# Patient Record
Sex: FEMALE | Race: WHITE | Employment: FULL TIME | URBAN - METROPOLITAN AREA
[De-identification: names, ages, dates, MRNs, and addresses within clinical notes are randomized per-mention and may not be internally consistent; named-entity substitution may affect disease eponyms.]

---

## 2017-01-16 ENCOUNTER — OFFICE VISIT (OUTPATIENT)
Dept: FAMILY MEDICINE CLINIC | Age: 32
End: 2017-01-16

## 2017-01-16 VITALS
DIASTOLIC BLOOD PRESSURE: 64 MMHG | OXYGEN SATURATION: 98 % | HEIGHT: 67 IN | SYSTOLIC BLOOD PRESSURE: 116 MMHG | WEIGHT: 175.8 LBS | TEMPERATURE: 97.8 F | BODY MASS INDEX: 27.59 KG/M2 | HEART RATE: 54 BPM

## 2017-01-16 DIAGNOSIS — F41.9 ANXIETY: ICD-10-CM

## 2017-01-16 DIAGNOSIS — G43.109 MIGRAINE WITH AURA AND WITHOUT STATUS MIGRAINOSUS, NOT INTRACTABLE: Primary | ICD-10-CM

## 2017-01-16 DIAGNOSIS — F33.2 ENDOGENOUS DEPRESSION (HCC): ICD-10-CM

## 2017-01-16 PROCEDURE — 99202 OFFICE O/P NEW SF 15 MIN: CPT | Performed by: FAMILY MEDICINE

## 2017-01-16 RX ORDER — SPIRONOLACTONE 25 MG/1
25 TABLET ORAL 2 TIMES DAILY
COMMUNITY
End: 2018-01-06 | Stop reason: ALTCHOICE

## 2017-01-16 RX ORDER — VENLAFAXINE HYDROCHLORIDE 150 MG/1
150 CAPSULE, EXTENDED RELEASE ORAL DAILY
Qty: 30 CAPSULE | Refills: 5 | Status: SHIPPED | OUTPATIENT
Start: 2017-01-16 | End: 2017-05-06

## 2017-01-16 RX ORDER — NORETHINDRONE ACETATE AND ETHINYL ESTRADIOL 1.5-30(21)
KIT ORAL DAILY
COMMUNITY
Start: 2016-12-27 | End: 2017-10-05 | Stop reason: HOSPADM

## 2017-01-16 RX ORDER — VENLAFAXINE HYDROCHLORIDE 37.5 MG/1
CAPSULE, EXTENDED RELEASE ORAL
Qty: 30 CAPSULE | Refills: 0 | Status: SHIPPED | OUTPATIENT
Start: 2017-01-16 | End: 2017-05-06 | Stop reason: SDUPTHER

## 2017-01-16 RX ORDER — ELETRIPTAN HYDROBROMIDE 40 MG/1
20-40 TABLET, FILM COATED ORAL
Qty: 6 TABLET | Refills: 3 | Status: SHIPPED | OUTPATIENT
Start: 2017-01-16 | End: 2017-12-18

## 2017-02-21 ENCOUNTER — TELEPHONE (OUTPATIENT)
Dept: FAMILY MEDICINE CLINIC | Age: 32
End: 2017-02-21

## 2017-03-06 ENCOUNTER — TELEPHONE (OUTPATIENT)
Dept: FAMILY MEDICINE CLINIC | Age: 32
End: 2017-03-06

## 2017-03-06 RX ORDER — OSELTAMIVIR PHOSPHATE 75 MG/1
75 CAPSULE ORAL 2 TIMES DAILY
Qty: 10 CAPSULE | Refills: 0 | Status: SHIPPED | OUTPATIENT
Start: 2017-03-06 | End: 2017-03-11

## 2017-05-06 ENCOUNTER — OFFICE VISIT (OUTPATIENT)
Dept: FAMILY MEDICINE CLINIC | Age: 32
End: 2017-05-06

## 2017-05-06 VITALS
SYSTOLIC BLOOD PRESSURE: 110 MMHG | TEMPERATURE: 98.2 F | OXYGEN SATURATION: 97 % | HEIGHT: 67 IN | HEART RATE: 81 BPM | DIASTOLIC BLOOD PRESSURE: 62 MMHG | WEIGHT: 174.2 LBS | BODY MASS INDEX: 27.34 KG/M2

## 2017-05-06 DIAGNOSIS — F33.2 ENDOGENOUS DEPRESSION (HCC): ICD-10-CM

## 2017-05-06 DIAGNOSIS — J01.40 ACUTE NON-RECURRENT PANSINUSITIS: ICD-10-CM

## 2017-05-06 DIAGNOSIS — F41.9 ANXIETY: ICD-10-CM

## 2017-05-06 DIAGNOSIS — G43.109 MIGRAINE WITH AURA AND WITHOUT STATUS MIGRAINOSUS, NOT INTRACTABLE: Primary | ICD-10-CM

## 2017-05-06 PROCEDURE — 99214 OFFICE O/P EST MOD 30 MIN: CPT | Performed by: FAMILY MEDICINE

## 2017-05-06 RX ORDER — FLUCONAZOLE 150 MG/1
150 TABLET ORAL DAILY PRN
Qty: 1 TABLET | Refills: 0 | Status: SHIPPED | OUTPATIENT
Start: 2017-05-06 | End: 2017-05-07

## 2017-05-06 RX ORDER — AMOXICILLIN 500 MG/1
1000 CAPSULE ORAL 2 TIMES DAILY
Qty: 40 CAPSULE | Refills: 0 | Status: SHIPPED | OUTPATIENT
Start: 2017-05-06 | End: 2017-05-16

## 2017-05-06 RX ORDER — VENLAFAXINE HYDROCHLORIDE 37.5 MG/1
CAPSULE, EXTENDED RELEASE ORAL
Qty: 24 CAPSULE | Refills: 0 | Status: SHIPPED | OUTPATIENT
Start: 2017-05-06 | End: 2018-01-06 | Stop reason: ALTCHOICE

## 2017-05-06 ASSESSMENT — PATIENT HEALTH QUESTIONNAIRE - PHQ9
SUM OF ALL RESPONSES TO PHQ QUESTIONS 1-9: 0
2. FEELING DOWN, DEPRESSED OR HOPELESS: 0
SUM OF ALL RESPONSES TO PHQ9 QUESTIONS 1 & 2: 0
1. LITTLE INTEREST OR PLEASURE IN DOING THINGS: 0

## 2017-06-05 ENCOUNTER — TELEPHONE (OUTPATIENT)
Dept: FAMILY MEDICINE CLINIC | Age: 32
End: 2017-06-05

## 2017-10-05 ENCOUNTER — HOSPITAL ENCOUNTER (OUTPATIENT)
Dept: SURGERY | Age: 32
Discharge: HOME | End: 2017-10-05
Attending: OBSTETRICS & GYNECOLOGY | Admitting: OBSTETRICS & GYNECOLOGY

## 2017-10-05 VITALS
TEMPERATURE: 98.3 F | HEART RATE: 81 BPM | WEIGHT: 170 LBS | BODY MASS INDEX: 26.68 KG/M2 | HEIGHT: 67 IN | RESPIRATION RATE: 20 BRPM | DIASTOLIC BLOOD PRESSURE: 69 MMHG | SYSTOLIC BLOOD PRESSURE: 105 MMHG | OXYGEN SATURATION: 98 %

## 2017-10-05 PROBLEM — O00.90 ECTOPIC PREGNANCY: Status: ACTIVE | Noted: 2017-10-05

## 2017-10-05 LAB — GONADOTROPIN, CHORIONIC (HCG) QUANT: 627.6 MIU/ML

## 2017-10-05 RX ORDER — KETOROLAC TROMETHAMINE 30 MG/ML
30 INJECTION, SOLUTION INTRAMUSCULAR; INTRAVENOUS ONCE
Status: COMPLETED | OUTPATIENT
Start: 2017-10-05 | End: 2017-10-05

## 2017-10-05 RX ORDER — HYDROMORPHONE HCL 110MG/55ML
PATIENT CONTROLLED ANALGESIA SYRINGE INTRAVENOUS
Status: COMPLETED
Start: 2017-10-05 | End: 2017-10-05

## 2017-10-05 RX ORDER — OXYCODONE HYDROCHLORIDE AND ACETAMINOPHEN 5; 325 MG/1; MG/1
1 TABLET ORAL PRN
Status: COMPLETED | OUTPATIENT
Start: 2017-10-05 | End: 2017-10-05

## 2017-10-05 RX ORDER — OXYCODONE HYDROCHLORIDE AND ACETAMINOPHEN 5; 325 MG/1; MG/1
2 TABLET ORAL PRN
Status: COMPLETED | OUTPATIENT
Start: 2017-10-05 | End: 2017-10-05

## 2017-10-05 RX ORDER — MEPERIDINE HYDROCHLORIDE 50 MG/ML
12.5 INJECTION INTRAMUSCULAR; INTRAVENOUS; SUBCUTANEOUS EVERY 5 MIN PRN
Status: DISCONTINUED | OUTPATIENT
Start: 2017-10-05 | End: 2017-10-06 | Stop reason: HOSPADM

## 2017-10-05 RX ORDER — HYDROCODONE BITARTRATE AND ACETAMINOPHEN 5; 325 MG/1; MG/1
1 TABLET ORAL
Qty: 10 TABLET | Refills: 0 | Status: SHIPPED | OUTPATIENT
Start: 2017-10-05 | End: 2017-10-12

## 2017-10-05 RX ORDER — KETOROLAC TROMETHAMINE 30 MG/ML
INJECTION, SOLUTION INTRAMUSCULAR; INTRAVENOUS
Status: DISCONTINUED
Start: 2017-10-05 | End: 2017-10-06 | Stop reason: HOSPADM

## 2017-10-05 RX ORDER — SODIUM CHLORIDE 0.9 % (FLUSH) 0.9 %
10 SYRINGE (ML) INJECTION PRN
Status: DISCONTINUED | OUTPATIENT
Start: 2017-10-05 | End: 2017-10-06 | Stop reason: HOSPADM

## 2017-10-05 RX ORDER — HYDRALAZINE HYDROCHLORIDE 20 MG/ML
5 INJECTION INTRAMUSCULAR; INTRAVENOUS EVERY 10 MIN PRN
Status: DISCONTINUED | OUTPATIENT
Start: 2017-10-05 | End: 2017-10-06 | Stop reason: HOSPADM

## 2017-10-05 RX ORDER — SODIUM CHLORIDE, SODIUM LACTATE, POTASSIUM CHLORIDE, CALCIUM CHLORIDE 600; 310; 30; 20 MG/100ML; MG/100ML; MG/100ML; MG/100ML
INJECTION, SOLUTION INTRAVENOUS CONTINUOUS
Status: DISCONTINUED | OUTPATIENT
Start: 2017-10-05 | End: 2017-10-06 | Stop reason: HOSPADM

## 2017-10-05 RX ORDER — LABETALOL HYDROCHLORIDE 5 MG/ML
5 INJECTION, SOLUTION INTRAVENOUS EVERY 10 MIN PRN
Status: DISCONTINUED | OUTPATIENT
Start: 2017-10-05 | End: 2017-10-06 | Stop reason: HOSPADM

## 2017-10-05 RX ORDER — MIDAZOLAM HYDROCHLORIDE 1 MG/ML
2 INJECTION INTRAMUSCULAR; INTRAVENOUS ONCE
Status: DISCONTINUED | OUTPATIENT
Start: 2017-10-05 | End: 2017-10-06 | Stop reason: HOSPADM

## 2017-10-05 RX ORDER — ONDANSETRON 2 MG/ML
4 INJECTION INTRAMUSCULAR; INTRAVENOUS EVERY 10 MIN PRN
Status: DISCONTINUED | OUTPATIENT
Start: 2017-10-05 | End: 2017-10-06 | Stop reason: HOSPADM

## 2017-10-05 RX ORDER — SODIUM CHLORIDE 0.9 % (FLUSH) 0.9 %
10 SYRINGE (ML) INJECTION EVERY 12 HOURS SCHEDULED
Status: DISCONTINUED | OUTPATIENT
Start: 2017-10-05 | End: 2017-10-06 | Stop reason: HOSPADM

## 2017-10-05 RX ORDER — LIDOCAINE HYDROCHLORIDE 10 MG/ML
1 INJECTION, SOLUTION EPIDURAL; INFILTRATION; INTRACAUDAL; PERINEURAL
Status: ACTIVE | OUTPATIENT
Start: 2017-10-05 | End: 2017-10-05

## 2017-10-05 RX ADMIN — KETOROLAC TROMETHAMINE 30 MG: 30 INJECTION, SOLUTION INTRAMUSCULAR; INTRAVENOUS at 14:20

## 2017-10-05 RX ADMIN — ONDANSETRON 4 MG: 2 INJECTION INTRAMUSCULAR; INTRAVENOUS at 15:17

## 2017-10-05 RX ADMIN — OXYCODONE HYDROCHLORIDE AND ACETAMINOPHEN 2 TABLET: 5; 325 TABLET ORAL at 14:37

## 2017-10-05 RX ADMIN — Medication 0.5 MG: at 13:52

## 2017-10-05 RX ADMIN — Medication 0.5 MG: at 13:57

## 2017-10-05 RX ADMIN — Medication 2 MG: at 13:51

## 2017-10-05 RX ADMIN — Medication 0.5 MG: at 14:04

## 2017-10-05 RX ADMIN — ONDANSETRON 4 MG: 2 INJECTION INTRAMUSCULAR; INTRAVENOUS at 16:52

## 2017-10-05 RX ADMIN — Medication 0.5 MG: at 13:44

## 2017-10-05 ASSESSMENT — PAIN DESCRIPTION - DESCRIPTORS: DESCRIPTORS: CRAMPING

## 2017-10-05 ASSESSMENT — PAIN SCALES - GENERAL
PAINLEVEL_OUTOF10: 9
PAINLEVEL_OUTOF10: 5
PAINLEVEL_OUTOF10: 9
PAINLEVEL_OUTOF10: 7
PAINLEVEL_OUTOF10: 7
PAINLEVEL_OUTOF10: 8
PAINLEVEL_OUTOF10: 8
PAINLEVEL_OUTOF10: 7

## 2017-10-05 ASSESSMENT — PAIN - FUNCTIONAL ASSESSMENT: PAIN_FUNCTIONAL_ASSESSMENT: 0-10

## 2017-10-05 ASSESSMENT — PAIN DESCRIPTION - PAIN TYPE: TYPE: SURGICAL PAIN

## 2017-10-05 ASSESSMENT — PAIN DESCRIPTION - ORIENTATION: ORIENTATION: MID

## 2017-10-05 ASSESSMENT — PAIN DESCRIPTION - LOCATION: LOCATION: ABDOMEN

## 2017-10-05 NOTE — PROGRESS NOTES
Pt eating chic vera in bed. Reporting nausea at this time. Medicated per PRN order will continue to monitor.

## 2017-10-05 NOTE — PROGRESS NOTES
Discharge instructions explained to pt and pt mother. All questions answered. Copy given to pt mother with script x1.

## 2017-10-05 NOTE — BRIEF OP NOTE
Brief Postoperative Note    Heidi Jeongwilber  YOB: 1985  [id number]    Pre-operative Diagnosis: r/o ectopic pregnancy    Post-operative Diagnosis: Same, Left F. Tube ectopic gestation    Procedure: DxLS, Evacuation of ectopic pregnancy-fimbriated portion of tube    Anesthesia: General    Surgeons/Assistants: LENIN Domingo    Estimated Blood Loss: 220     Complications: None    Specimens: Was Obtained: clot/poc from sxn    Findings: 100 cc clot/old blood.  fimbrated portion of left f.tube clot/tissue    Electronically signed by Luann Pickett MD on 10/5/2017 at 1:20 PM

## 2017-10-05 NOTE — IP AVS SNAPSHOT
After Visit Summary  (Discharge Instructions)    Medication List for Home    Based on the information you provided to us as well as any changes during this visit, the following is your updated medication list.  Compare this with your prescription bottles at home. If you have any questions or concerns, contact your primary care physician's office. Daily Medication List (This medication list can be shared with any healthcare provider who is helping you manage your medications)      There are NEW medications for you. START taking them after you leave the hospital        Last Dose    Next Dose Due AM NOON PM NIGHT    HYDROcodone-acetaminophen 5-325 MG per tablet   Commonly known as:  NORCO   Take 1 tablet by mouth every 3 hours as needed for Pain . These are medications you told us you were taking at home, CONTINUE taking them after you leave the hospital        Last Dose    Next Dose Due AM NOON PM NIGHT    eletriptan 40 MG tablet   Commonly known as:  RELPAX   Take 0.5-1 tablets by mouth once as needed (migraine; can repeat in two hours) may repeat in 2 hours if necessary                                         spironolactone 25 MG tablet   Commonly known as:  ALDACTONE   Take 25 mg by mouth 2 times daily                                           ASK your doctor about these medications if you have questions        Last Dose    Next Dose Due AM NOON PM NIGHT    venlafaxine 37.5 MG extended release capsule   Commonly known as:  EFFEXOR XR   Take 3 pills a day by mouth for 4 days, then 2 pills a day by mouth for 4 days, then take 1 pill a day by mouth for 4 days, then stop.                                            These are the medications you have told us you were taking at home, STOP taking them after you leave the hospital     ibuprofen 800 MG tablet   Commonly known as:  ADVIL;MOTRIN       MICROGESTIN FE 1.5/30 1.5-30 MG-MCG tablet Generic drug:  norethindrone-ethinyl estradiol-iron            Where to Get Your Medications      You can get these medications from any pharmacy     Bring a paper prescription for each of these medications     HYDROcodone-acetaminophen 5-325 MG per tablet               Allergies as of 10/5/2017     No Known Allergies      Immunizations as of 10/5/2017     Name Date Dose VIS Date Route    MMR 5/10/2016 0.5 mL 2012 Subcutaneous      Last Vitals          Most Recent Value    Temp  97.5 °F (36.4 °C)    Pulse  87    Resp  21    BP  103/83         After Visit Summary    This summary was created for you. Thank you for entrusting your care to us. The following information includes details about your hospital/visit stay along with steps you should take to help with your recovery once you leave the hospital.  In this packet, you will find information about the topics listed below:    · Instructions about your medications including a list of your home medications  · A summary of your hospital visit  · Follow-up appointments once you have left the hospital  · Your care plan at home      You may receive a survey regarding the care you received during your stay. Your input is valuable to us. We encourage you to complete and return your survey in the envelope provided. We hope you will choose us in the future for your healthcare needs. Patient Information     Patient Name TERRY Ramos 1985      Care Provided at:     Name Address Phone       [facility] [address] [phone]            Your Visit    Here you will find information about your visit, including the reason for your visit. Please take this sheet with you when you visit your doctor or other health care provider in the future. It will help determine the best possible medical care for you at that time.   If you have any questions once you leave the hospital, please call the department phone number listed below. Why you were here     Your primary diagnosis was:  Not on File    Your diagnoses also included:  Ectopic Pregnancy      Visit Information     Date & Time Provider Department Dept. Phone    10/5/2017 Keith Sanches MD [id number] [facility] [phone]       Follow-up Appointments    Below is a list of your follow-up and future appointments. This may not be a complete list as you may have made appointments directly with providers that we are not aware of or your providers may have made some for you. Please call your providers to confirm appointments. It is important to keep your appointments. Please bring your current insurance card, photo ID, co-pay, and all medication bottles to your appointment. If self-pay, payment is expected at the time of service. Preventive Care        Date Due    Tetanus Combination Vaccine (1 - Tdap) 8/24/2002    Pap Smear 8/24/2004    Yearly Flu Vaccine (1) 9/1/2017    HIV screening is recommended for all people regardless of risk factors  aged 15-65 years at least once (lifetime) who have never been HIV tested. 5/6/2018 (Originally 8/24/1998)                 Care Plan Once You Return Home    This section includes instructions you will need to follow once you leave the hospital.  Your care team will discuss these with you, so you and those caring for you know how to best care for your health needs at home. This section may also include educational information about certain health topics that may be of help to you. Discharge Instructions            LAPAROSCOPY      Discharge instructions for day surgery patients and family    1. Since you may experience some intermittent light-headedness for the next several hours, we suggest you plan on bed rest or quiet relaxation this evening. You must  have a friend or relative stay with you tonight. 2.  Because of the sedation you have received it is recommended that you do not drive a motor vehicle, operate any kind of machinery, or sign any contractual agreement for 24 hours following  The procedure. 3.  You should not take any alcoholic beverages tonight and only take sleeping  medication that has been specifically prescribed for you by your physician. 4.  Eat light for your first meal and then gradually progress yourself back to a regular diet. 5.  If you experience pain in your surgical area take Tylenol, or the pain medication prescribed by your doctor. Some pain medications are very irritating when taken on an empty stomach, so try to take the medication wit a light meal or a glass of milk  6. If you have any fever, chills, bleeding or uncontrollable pain or any other problems, notify your surgeon. OTHER INSTRUCTIONS:    PAIN:     Tylenol or Norco q 4-6 hours PRN, call if no relief  ________________________________________________________________________  ACTIVITY:    Take it easy 1-2 days  EXERCISE:    After 1 week.  ________________________________________________________________________  SEX, DOUCHING, TAMPONS None for 1 week  SHOWER:    May shower in 24 hours. ________________________________________________________________________  Diana Bors, SWIMMING:  None for 1 week  APPOINTMENT:   Call for appointment 1 week. Needs to have bhcg drawn in office in 5 days.   ________________________________________________________________________  DRESSING:                    Discharge Instructions  Call Surgeon if you have:  · Temperature greater than 100.4  · Persistent nausea and vomiting  · Severe uncontrolled pain  · Redness, tenderness, or signs of infection (pain, swelling, redness, odor or green/yellow discharge around the site)  · Difficulty breathing, headache or visual disturbances  · Hives  · Persistent dizziness or light-headedness  · Extreme fatigue infection. Sometimes patients with SSIs also need another surgery to treat the infection. What can I do to help prevent SSIs? HAND WASHING Instructions:  Wash your hands well using soap and water and rub hands together for ten seconds. All health care workers should wash their hands prior to providing care to you, including after removing gloves. You should wash your hands  ? after using the restroom, coughing, blowing your nose or sneezing  ? before and after eating, drinking or handling food  ? after handling dirty items  ? when your hands look dirty  ? after touching or coming into contact with a person who is sick with an infection  After your surgery:   Family and friends who visit you should not touch the surgical wound or dressings.  Family and friends should clean their hands with soap and water or an alcohol-based hand rub before and after visiting you. If you do not see them clean their hands, ask them to clean their hands. What do I need to do when I go home from the hospital?   Before you go home, your doctor or nurse should explain everything you need to know about taking care of your wound. Make sure you understand how to care for your wound before you leave the hospital.   Always clean your hands before and after caring for your wound.  Before you go home, make sure you know who to contact if you have questions or problems after you get home.  If you have any symptoms of an infection, such as redness and pain at the surgery site, drainage, or fever, call your doctor immediately. Some patients are sent home from the hospital with a catheter or port in order to continue their treatment. If you go home with a catheter or port, your doctors and nurses will explain everything you need to know about taking care of your catheter.     Make sure you understand how to care for the catheter before leaving the hospital. For example, ask for instructions on showering or bathing with the catheter and how to change the catheter dressing.  Make sure you know who to contact if you have questions or problems after you get home.  Make sure you wash your hands with soap and water or an alcohol-based hand rub before handling your catheter.  Watch for the signs and symptoms of catheter-associated bloodstream infection, such as soreness or redness at the catheter site or fever, and call your healthcare provider immediately if any occur. Belongings Returned: yes      Kodi Gent   Home Medication Instructions PAL:[id number]    Printed on:10/05/17 9491   Medication Information                      eletriptan (RELPAX) 40 MG tablet  Take 0.5-1 tablets by mouth once as needed (migraine; can repeat in two hours) may repeat in 2 hours if necessary             HYDROcodone-acetaminophen (NORCO) 5-325 MG per tablet  Take 1 tablet by mouth every 3 hours as needed for Pain . spironolactone (ALDACTONE) 25 MG tablet  Take 25 mg by mouth 2 times daily             venlafaxine (EFFEXOR XR) 37.5 MG extended release capsule  Take 3 pills a day by mouth for 4 days, then 2 pills a day by mouth for 4 days, then take 1 pill a day by mouth for 4 days, then stop. I understand and acknowledge receipt of the above instructions. Patient or Guardian Signature                                                         Date/Time                                                                                                                                            Physician's or R.N.'s Signature                                                                  Date/Time    The discharge instructions have been reviewed with the patient and/or Guardian. Patient and/or Guardian signed and retained a printed copy.       [facility] Signup

## 2017-10-06 NOTE — OP NOTE
[address] [address]                               OPERATIVE REPORT    PATIENT NAME: Jaci Sethi                    :             1985  MED REC NO:   [id number]                           ROOM:  ACCOUNT NO:   [de-identified]                           ADMISSION DATE:  10/05/2017  PROVIDER:     Delila Skiff, MD    DATE OF PROCEDURE:  10/05/2017    PREOPERATIVE DIAGNOSIS:  Ectopic gestation. POSTOPERATIVE DIAGNOSIS:  Ectopic gestation. PROCEDURE:  Diagnostic laparoscopy with evacuation of probable ectopic  gestation from the left fallopian tube - fimbriated portion,  evacuation of clot and blood. SURGEON:  Delila Skiff, MD.    ANESTHESIA:  General.    ESTIMATED BLOOD LOSS:  100 mL. BLOOD TYPE:  Rh positive. SPECIMEN:  Products of conception including clot and blood. INDICATIONS AND CONSENT:  A 53-year-old  4, para 1 AB 2 with a  prior ectopic gestation presents for evaluation management. The  patient has had an abnormal progesterone and slow rising beta hCG. This morning she began having pain which was severe prompting visit in  the office. Ultrasound noted echogenicity in the left adnexal region  as well as free fluid. With her significant history, decision was  made to proceed with laparoscopy. Side effects, benefits and risks  were outlined. Consent was obtained. All questions were answered. DESCRIPTION OF PROCEDURE:  The patient was taken to the OR on  10/05/2017 . She was placed in the OR table in supine position and  general anesthetic was administered. Arms were tucked and great care  was taken to reduce any pressure points. Exam under anesthesia was  performed noting a small amount of vaginal bleeding, but with gentle  adnexal exam no gross masses. The patient was prepped and draped in  sterile fashion. Her bladder was emptied in sterile fashion.     Weighted Two incisions were closed with  four Vicryl in subcuticular fashion. Allis clamp was removed in the  cervix. The patient was taken out of dorsal lithotomy position,  awakened the OR to the recovery room in good condition. Final sponge,  needle counts were as correct. FINDINGS:  Ectopic gestation on the left fallopian tube at its  fimbriated portion. 100 mL of clot and old blood. Tissue was sent  for pathologic verification.         Willy Orr MD    D: 10/05/2017 13:30:26       T: 10/05/2017 13:44:55     [email or web address]  Job#: [id number]     Doc#: [id number]

## 2018-05-07 ENCOUNTER — OFFICE VISIT (OUTPATIENT)
Dept: FAMILY MEDICINE CLINIC | Age: 33
End: 2018-05-07

## 2018-05-07 VITALS
OXYGEN SATURATION: 98 % | WEIGHT: 196.38 LBS | HEART RATE: 106 BPM | BODY MASS INDEX: 29.86 KG/M2 | SYSTOLIC BLOOD PRESSURE: 99 MMHG | TEMPERATURE: 98.8 F | DIASTOLIC BLOOD PRESSURE: 66 MMHG

## 2018-05-07 DIAGNOSIS — J01.90 ACUTE BACTERIAL SINUSITIS: Primary | ICD-10-CM

## 2018-05-07 DIAGNOSIS — B96.89 ACUTE BACTERIAL SINUSITIS: Primary | ICD-10-CM

## 2018-05-07 PROCEDURE — 99213 OFFICE O/P EST LOW 20 MIN: CPT | Performed by: FAMILY MEDICINE

## 2018-05-07 RX ORDER — AZITHROMYCIN 250 MG/1
TABLET, FILM COATED ORAL
Qty: 1 PACKET | Refills: 0 | Status: SHIPPED | OUTPATIENT
Start: 2018-05-07 | End: 2018-05-11

## 2018-06-23 PROBLEM — O36.8190 DECREASED FETAL MOVEMENT AFFECTING PREGNANCY, ANTEPARTUM: Status: ACTIVE | Noted: 2018-06-23

## 2018-07-10 PROBLEM — Z37.9 NORMAL LABOR: Status: ACTIVE | Noted: 2018-07-10

## 2018-07-12 PROBLEM — Z34.90 TERM PREGNANCY: Status: ACTIVE | Noted: 2018-07-12

## 2018-09-13 ENCOUNTER — TELEPHONE (OUTPATIENT)
Dept: FAMILY MEDICINE CLINIC | Age: 33
End: 2018-09-13

## 2018-09-14 NOTE — TELEPHONE ENCOUNTER
To avoid any fatigue, the next thing to try would be Vit B2 daily and butterbur 75 mg bid - it's an OTC supplement with good results for migraine prevention.  Have to give it a month like any other migraine prevention med

## 2018-09-14 NOTE — TELEPHONE ENCOUNTER
Patient is aware of the preventative suggestions but is curious what you suggest for the migraines that she is currently getting. The Imitrex is not helping - she had some of this from before.  She is curious about starting the Relpax again, but is open to whatever else you may suggest.

## 2018-09-17 RX ORDER — ELETRIPTAN HYDROBROMIDE 40 MG/1
20-40 TABLET, FILM COATED ORAL
Qty: 6 TABLET | Refills: 3 | Status: SHIPPED | OUTPATIENT
Start: 2018-09-17 | End: 2019-05-27

## 2018-10-01 ENCOUNTER — TELEPHONE (OUTPATIENT)
Dept: FAMILY MEDICINE CLINIC | Age: 33
End: 2018-10-01

## 2018-10-01 RX ORDER — VENLAFAXINE HYDROCHLORIDE 150 MG/1
150 CAPSULE, EXTENDED RELEASE ORAL DAILY
Qty: 30 CAPSULE | Refills: 0 | OUTPATIENT
Start: 2018-10-01 | End: 2018-10-01

## 2018-10-01 RX ORDER — VENLAFAXINE HYDROCHLORIDE 37.5 MG/1
CAPSULE, EXTENDED RELEASE ORAL
Qty: 18 CAPSULE | Refills: 0 | OUTPATIENT
Start: 2018-10-01 | End: 2018-10-01

## 2018-10-03 PROBLEM — Z37.9 NORMAL LABOR: Status: RESOLVED | Noted: 2018-07-10 | Resolved: 2018-10-03

## 2018-10-03 PROBLEM — Z34.90 TERM PREGNANCY: Status: RESOLVED | Noted: 2018-07-12 | Resolved: 2018-10-03

## 2018-10-03 PROBLEM — O00.90 ECTOPIC PREGNANCY: Status: RESOLVED | Noted: 2017-10-05 | Resolved: 2018-10-03

## 2018-10-05 ENCOUNTER — OFFICE VISIT (OUTPATIENT)
Dept: FAMILY MEDICINE CLINIC | Age: 33
End: 2018-10-05
Payer: COMMERCIAL

## 2018-10-05 VITALS
WEIGHT: 184 LBS | SYSTOLIC BLOOD PRESSURE: 98 MMHG | BODY MASS INDEX: 27.98 KG/M2 | TEMPERATURE: 98.7 F | DIASTOLIC BLOOD PRESSURE: 56 MMHG | HEART RATE: 65 BPM | OXYGEN SATURATION: 100 %

## 2018-10-05 DIAGNOSIS — F41.9 ANXIETY: Primary | ICD-10-CM

## 2018-10-05 PROCEDURE — 99213 OFFICE O/P EST LOW 20 MIN: CPT | Performed by: FAMILY MEDICINE

## 2018-10-05 RX ORDER — BUSPIRONE HYDROCHLORIDE 5 MG/1
5-10 TABLET ORAL 3 TIMES DAILY PRN
Qty: 90 TABLET | Refills: 2 | Status: SHIPPED | OUTPATIENT
Start: 2018-10-05 | End: 2019-10-23

## 2018-10-05 RX ORDER — SERTRALINE HYDROCHLORIDE 25 MG/1
12.5-5 TABLET, FILM COATED ORAL DAILY
Qty: 60 TABLET | Refills: 5 | Status: SHIPPED | OUTPATIENT
Start: 2018-10-05 | End: 2019-05-28

## 2018-10-05 ASSESSMENT — PATIENT HEALTH QUESTIONNAIRE - PHQ9
SUM OF ALL RESPONSES TO PHQ QUESTIONS 1-9: 2
SUM OF ALL RESPONSES TO PHQ QUESTIONS 1-9: 2
SUM OF ALL RESPONSES TO PHQ9 QUESTIONS 1 & 2: 2
2. FEELING DOWN, DEPRESSED OR HOPELESS: 1
1. LITTLE INTEREST OR PLEASURE IN DOING THINGS: 1

## 2018-10-05 NOTE — PATIENT INSTRUCTIONS
Please read the healthy family handout that you were given and share it with your family. Please compare this printed medication list with your medications at home to be sure they are the same. If you have any medications that are different please contact us immediately at [phone]. Also review your allergies that we have listed, these may also include medications that you have not been able to tolerate, make sure everything listed is correct. If you have any allergies that are different please contact us immediately at [phone].

## 2018-10-05 NOTE — PROGRESS NOTES
Assessment and plan  1. Anxiety  Sertraline 12.5 mg q.d. ×5 days, then 25 mg q.d. ×5 days, then 37. 5×5 days and 50 mg a day. If she feels better before she gets to 50 she can stay at that dose. Buspirone 5-10 mg t.i.d. prn. Take the first dose in the evening to see how she does. Healthy Family prevention recommendations given. Continue all current prescription medications as listed below. RTC 2 months or sooner prn. If she is doing very well she can call [facility] cancel: Repeat return in 6 months    Subjective  Patient returns for reevaluation of her anxiety. It's got much worse this summer. She had a baby in July that was a month premature and had to be in the NICU for a month. Her boyfriend is away at work in [place]. She has a 3year-old at home. She's had some issues with her mother and stepdad and they no longer come around to help. She  reports that she has never smoked.  She has never used smokeless tobacco.  Allergies   Allergen Reactions    Topamax [Topiramate] Other (See Comments)     Kidney stones    Cymbalta [Duloxetine Hcl] Other (See Comments)     moodiness     Past history:  She reports she has recently seen her gynecologist.    Review of systems:  Constitutional:  fatigue - no                                                  abnormal weight loss - no  Cardiac:  chest pain or discomfort - no                 lightheadedness - no  Respiratory: wheezing - no                       dyspnea on exertion - no            unusual cough - no  Gastrointestinal:  frequent heartburn- no                             dysphagia - no  Urologic:  Hematuria - no                   Dysuria - no    Objective  BP (!) 98/56   Pulse 65   Temp 98.7 °F (37.1 °C) (Oral)   Wt 184 lb (83.5 kg)   SpO2 100%   BMI 27.98 kg/m²   Patient is alert, oriented, and in no acute distress  Psych:  mood and affect are unremarkable               speech and thought processes appear intact               Behavior and

## 2018-10-06 NOTE — TELEPHONE ENCOUNTER
Spoke to [facility] in [facility] and they do have the medication. Unable to leave patient message VM is full. Need to know if we can send rx here for her.

## 2018-10-08 ENCOUNTER — HOSPITAL ENCOUNTER (EMERGENCY)
Age: 33
Discharge: LEFT BEFORE DISCHARGE | End: 2018-10-08
Payer: COMMERCIAL

## 2018-10-08 VITALS
OXYGEN SATURATION: 99 % | BODY MASS INDEX: 27.28 KG/M2 | DIASTOLIC BLOOD PRESSURE: 76 MMHG | SYSTOLIC BLOOD PRESSURE: 120 MMHG | WEIGHT: 180 LBS | TEMPERATURE: 97.8 F | RESPIRATION RATE: 16 BRPM | HEART RATE: 68 BPM | HEIGHT: 68 IN

## 2018-10-08 PROCEDURE — 4500000002 HC ER NO CHARGE

## 2018-10-08 RX ORDER — SUMATRIPTAN 100 MG/1
100 TABLET, FILM COATED ORAL
COMMUNITY
End: 2019-05-27

## 2018-10-08 RX ORDER — ELETRIPTAN HYDROBROMIDE 40 MG/1
40 TABLET, FILM COATED ORAL
COMMUNITY
End: 2019-05-27

## 2018-10-08 ASSESSMENT — PAIN DESCRIPTION - LOCATION: LOCATION: HEAD

## 2018-10-08 ASSESSMENT — PAIN SCALES - GENERAL: PAINLEVEL_OUTOF10: 9

## 2018-10-08 ASSESSMENT — PAIN DESCRIPTION - ORIENTATION: ORIENTATION: LEFT

## 2018-10-09 ENCOUNTER — APPOINTMENT (OUTPATIENT)
Dept: CT IMAGING | Age: 33
End: 2018-10-09
Payer: COMMERCIAL

## 2018-10-09 ENCOUNTER — HOSPITAL ENCOUNTER (EMERGENCY)
Age: 33
Discharge: HOME | End: 2018-10-09
Attending: EMERGENCY MEDICINE
Payer: COMMERCIAL

## 2018-10-09 VITALS
TEMPERATURE: 98.6 F | BODY MASS INDEX: 27.32 KG/M2 | RESPIRATION RATE: 16 BRPM | WEIGHT: 170 LBS | SYSTOLIC BLOOD PRESSURE: 116 MMHG | HEIGHT: 66 IN | OXYGEN SATURATION: 98 % | HEART RATE: 55 BPM | DIASTOLIC BLOOD PRESSURE: 56 MMHG

## 2018-10-09 DIAGNOSIS — R51.9 LEFT-SIDED HEADACHE: Primary | ICD-10-CM

## 2018-10-09 LAB
AMORPHOUS: ABNORMAL /HPF
BACTERIA: ABNORMAL /HPF
BILIRUBIN URINE: NEGATIVE
BLOOD, URINE: ABNORMAL
CLARITY: ABNORMAL
COLOR: YELLOW
EPITHELIAL CELLS, UA: ABNORMAL /HPF
GLUCOSE URINE: NEGATIVE MG/DL
HCG(URINE) PREGNANCY TEST: NEGATIVE
KETONES, URINE: NEGATIVE MG/DL
LEUKOCYTE ESTERASE, URINE: ABNORMAL
MICROSCOPIC EXAMINATION: YES
NITRITE, URINE: NEGATIVE
PH UA: 8.5
PROTEIN UA: NEGATIVE MG/DL
RBC UA: ABNORMAL /HPF (ref 0–2)
SPECIFIC GRAVITY UA: 1.01
URINE REFLEX TO CULTURE: YES
URINE TYPE: ABNORMAL
UROBILINOGEN, URINE: 0.2 E.U./DL
WBC UA: ABNORMAL /HPF (ref 0–5)

## 2018-10-09 PROCEDURE — 96375 TX/PRO/DX INJ NEW DRUG ADDON: CPT

## 2018-10-09 PROCEDURE — 6360000002 HC RX W HCPCS: Performed by: EMERGENCY MEDICINE

## 2018-10-09 PROCEDURE — 87086 URINE CULTURE/COLONY COUNT: CPT

## 2018-10-09 PROCEDURE — 2580000003 HC RX 258: Performed by: EMERGENCY MEDICINE

## 2018-10-09 PROCEDURE — 70450 CT HEAD/BRAIN W/O DYE: CPT

## 2018-10-09 PROCEDURE — 96374 THER/PROPH/DIAG INJ IV PUSH: CPT

## 2018-10-09 PROCEDURE — 84703 CHORIONIC GONADOTROPIN ASSAY: CPT

## 2018-10-09 PROCEDURE — 99284 EMERGENCY DEPT VISIT MOD MDM: CPT

## 2018-10-09 PROCEDURE — 81001 URINALYSIS AUTO W/SCOPE: CPT

## 2018-10-09 PROCEDURE — 96361 HYDRATE IV INFUSION ADD-ON: CPT

## 2018-10-09 RX ORDER — 0.9 % SODIUM CHLORIDE 0.9 %
1000 INTRAVENOUS SOLUTION INTRAVENOUS ONCE
Status: COMPLETED | OUTPATIENT
Start: 2018-10-09 | End: 2018-10-09

## 2018-10-09 RX ORDER — METOCLOPRAMIDE HYDROCHLORIDE 5 MG/ML
10 INJECTION INTRAMUSCULAR; INTRAVENOUS ONCE
Status: COMPLETED | OUTPATIENT
Start: 2018-10-09 | End: 2018-10-09

## 2018-10-09 RX ORDER — DIPHENHYDRAMINE HYDROCHLORIDE 50 MG/ML
25 INJECTION INTRAMUSCULAR; INTRAVENOUS ONCE
Status: COMPLETED | OUTPATIENT
Start: 2018-10-09 | End: 2018-10-09

## 2018-10-09 RX ADMIN — Medication 25 MG: at 01:36

## 2018-10-09 RX ADMIN — SODIUM CHLORIDE 1000 ML: 9 INJECTION, SOLUTION INTRAVENOUS at 01:36

## 2018-10-09 RX ADMIN — METOCLOPRAMIDE HYDROCHLORIDE 10 MG: 5 INJECTION INTRAMUSCULAR; INTRAVENOUS at 01:36

## 2018-10-09 ASSESSMENT — ENCOUNTER SYMPTOMS
FACIAL SWELLING: 0
VOICE CHANGE: 0
SHORTNESS OF BREATH: 0
STRIDOR: 0
TROUBLE SWALLOWING: 0
PHOTOPHOBIA: 1
COLOR CHANGE: 0
WHEEZING: 0
ABDOMINAL PAIN: 0
VOMITING: 0
NAUSEA: 0

## 2018-10-09 NOTE — ED NOTES
--Patient provided with discharge instructions. --Instructions and follow-up appointments reviewed with patient/family. No further questions or needs at this time. --Vital signs and patient stable upon discharge. --Patient ambulatory to [facility].         Arlen Lin RN  10/09/18 [id number]
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
## 2018-10-10 ENCOUNTER — TELEPHONE (OUTPATIENT)
Dept: FAMILY MEDICINE CLINIC | Age: 33
End: 2018-10-10

## 2018-10-10 LAB — URINE CULTURE, ROUTINE: NORMAL

## 2018-10-10 RX ORDER — INDOMETHACIN 50 MG/1
50 CAPSULE ORAL 3 TIMES DAILY
Qty: 9 CAPSULE | Refills: 0 | Status: SHIPPED | OUTPATIENT
Start: 2018-10-10 | End: 2019-03-07

## 2019-02-19 ENCOUNTER — TELEPHONE (OUTPATIENT)
Dept: FAMILY MEDICINE CLINIC | Age: 34
End: 2019-02-19

## 2019-03-17 ENCOUNTER — ANESTHESIA EVENT (OUTPATIENT)
Dept: OPERATING ROOM | Age: 34
End: 2019-03-17
Payer: COMMERCIAL

## 2019-03-17 ASSESSMENT — LIFESTYLE VARIABLES: SMOKING_STATUS: 0

## 2019-03-18 ENCOUNTER — HOSPITAL ENCOUNTER (OUTPATIENT)
Age: 34
Setting detail: OUTPATIENT SURGERY
Discharge: HOME | End: 2019-03-18
Attending: OBSTETRICS & GYNECOLOGY | Admitting: OBSTETRICS & GYNECOLOGY
Payer: COMMERCIAL

## 2019-03-18 ENCOUNTER — ANESTHESIA (OUTPATIENT)
Dept: OPERATING ROOM | Age: 34
End: 2019-03-18
Payer: COMMERCIAL

## 2019-03-18 VITALS — SYSTOLIC BLOOD PRESSURE: 100 MMHG | OXYGEN SATURATION: 100 % | TEMPERATURE: 96.6 F | DIASTOLIC BLOOD PRESSURE: 58 MMHG

## 2019-03-18 VITALS
HEIGHT: 68 IN | RESPIRATION RATE: 14 BRPM | OXYGEN SATURATION: 96 % | BODY MASS INDEX: 27.74 KG/M2 | SYSTOLIC BLOOD PRESSURE: 109 MMHG | DIASTOLIC BLOOD PRESSURE: 57 MMHG | TEMPERATURE: 97 F | HEART RATE: 43 BPM | WEIGHT: 183 LBS

## 2019-03-18 DIAGNOSIS — N93.8 DYSFUNCTIONAL UTERINE BLEEDING: ICD-10-CM

## 2019-03-18 DIAGNOSIS — D25.9 UTERINE LEIOMYOMA, UNSPECIFIED LOCATION: ICD-10-CM

## 2019-03-18 PROBLEM — N93.9 ABNORMAL UTERINE BLEEDING: Status: ACTIVE | Noted: 2019-03-18

## 2019-03-18 LAB
ABO/RH: NORMAL
ANTIBODY SCREEN: NORMAL
PREGNANCY, URINE: NEGATIVE

## 2019-03-18 PROCEDURE — 6370000000 HC RX 637 (ALT 250 FOR IP): Performed by: ANESTHESIOLOGY

## 2019-03-18 PROCEDURE — 3700000001 HC ADD 15 MINUTES (ANESTHESIA): Performed by: OBSTETRICS & GYNECOLOGY

## 2019-03-18 PROCEDURE — 84703 CHORIONIC GONADOTROPIN ASSAY: CPT

## 2019-03-18 PROCEDURE — 6360000002 HC RX W HCPCS: Performed by: ANESTHESIOLOGY

## 2019-03-18 PROCEDURE — 86850 RBC ANTIBODY SCREEN: CPT

## 2019-03-18 PROCEDURE — 7100000010 HC PHASE II RECOVERY - FIRST 15 MIN: Performed by: OBSTETRICS & GYNECOLOGY

## 2019-03-18 PROCEDURE — 3600000009 HC SURGERY ROBOT BASE: Performed by: OBSTETRICS & GYNECOLOGY

## 2019-03-18 PROCEDURE — 2500000003 HC RX 250 WO HCPCS: Performed by: OBSTETRICS & GYNECOLOGY

## 2019-03-18 PROCEDURE — 7100000001 HC PACU RECOVERY - ADDTL 15 MIN: Performed by: OBSTETRICS & GYNECOLOGY

## 2019-03-18 PROCEDURE — 6360000002 HC RX W HCPCS: Performed by: OBSTETRICS & GYNECOLOGY

## 2019-03-18 PROCEDURE — 2580000003 HC RX 258: Performed by: OBSTETRICS & GYNECOLOGY

## 2019-03-18 PROCEDURE — S2900 ROBOTIC SURGICAL SYSTEM: HCPCS | Performed by: OBSTETRICS & GYNECOLOGY

## 2019-03-18 PROCEDURE — 7100000011 HC PHASE II RECOVERY - ADDTL 15 MIN: Performed by: OBSTETRICS & GYNECOLOGY

## 2019-03-18 PROCEDURE — 2709999900 HC NON-CHARGEABLE SUPPLY: Performed by: OBSTETRICS & GYNECOLOGY

## 2019-03-18 PROCEDURE — 88307 TISSUE EXAM BY PATHOLOGIST: CPT

## 2019-03-18 PROCEDURE — 3700000000 HC ANESTHESIA ATTENDED CARE: Performed by: OBSTETRICS & GYNECOLOGY

## 2019-03-18 PROCEDURE — 2500000003 HC RX 250 WO HCPCS: Performed by: NURSE ANESTHETIST, CERTIFIED REGISTERED

## 2019-03-18 PROCEDURE — 6360000002 HC RX W HCPCS: Performed by: NURSE ANESTHETIST, CERTIFIED REGISTERED

## 2019-03-18 PROCEDURE — 86901 BLOOD TYPING SEROLOGIC RH(D): CPT

## 2019-03-18 PROCEDURE — 7100000000 HC PACU RECOVERY - FIRST 15 MIN: Performed by: OBSTETRICS & GYNECOLOGY

## 2019-03-18 PROCEDURE — 3600000019 HC SURGERY ROBOT ADDTL 15MIN: Performed by: OBSTETRICS & GYNECOLOGY

## 2019-03-18 PROCEDURE — 86900 BLOOD TYPING SEROLOGIC ABO: CPT

## 2019-03-18 RX ORDER — OXYCODONE HYDROCHLORIDE AND ACETAMINOPHEN 5; 325 MG/1; MG/1
1 TABLET ORAL PRN
Status: COMPLETED | OUTPATIENT
Start: 2019-03-18 | End: 2019-03-18

## 2019-03-18 RX ORDER — ACETAMINOPHEN 500 MG
1500 TABLET ORAL ONCE
Status: COMPLETED | OUTPATIENT
Start: 2019-03-18 | End: 2019-03-18

## 2019-03-18 RX ORDER — BUPIVACAINE HYDROCHLORIDE AND EPINEPHRINE 5; 5 MG/ML; UG/ML
INJECTION, SOLUTION PERINEURAL PRN
Status: DISCONTINUED | OUTPATIENT
Start: 2019-03-18 | End: 2019-03-18 | Stop reason: ALTCHOICE

## 2019-03-18 RX ORDER — HYDRALAZINE HYDROCHLORIDE 20 MG/ML
5 INJECTION INTRAMUSCULAR; INTRAVENOUS EVERY 10 MIN PRN
Status: DISCONTINUED | OUTPATIENT
Start: 2019-03-18 | End: 2019-03-18 | Stop reason: HOSPADM

## 2019-03-18 RX ORDER — SODIUM CHLORIDE, SODIUM LACTATE, POTASSIUM CHLORIDE, CALCIUM CHLORIDE 600; 310; 30; 20 MG/100ML; MG/100ML; MG/100ML; MG/100ML
INJECTION, SOLUTION INTRAVENOUS CONTINUOUS
Status: DISCONTINUED | OUTPATIENT
Start: 2019-03-18 | End: 2019-03-18 | Stop reason: HOSPADM

## 2019-03-18 RX ORDER — ONDANSETRON 2 MG/ML
INJECTION INTRAMUSCULAR; INTRAVENOUS PRN
Status: DISCONTINUED | OUTPATIENT
Start: 2019-03-18 | End: 2019-03-18 | Stop reason: SDUPTHER

## 2019-03-18 RX ORDER — PROPOFOL 10 MG/ML
INJECTION, EMULSION INTRAVENOUS PRN
Status: DISCONTINUED | OUTPATIENT
Start: 2019-03-18 | End: 2019-03-18 | Stop reason: SDUPTHER

## 2019-03-18 RX ORDER — MEPERIDINE HYDROCHLORIDE 50 MG/ML
12.5 INJECTION INTRAMUSCULAR; INTRAVENOUS; SUBCUTANEOUS EVERY 5 MIN PRN
Status: DISCONTINUED | OUTPATIENT
Start: 2019-03-18 | End: 2019-03-18 | Stop reason: HOSPADM

## 2019-03-18 RX ORDER — SCOLOPAMINE TRANSDERMAL SYSTEM 1 MG/1
1 PATCH, EXTENDED RELEASE TRANSDERMAL
Status: DISCONTINUED | OUTPATIENT
Start: 2019-03-18 | End: 2019-03-18 | Stop reason: HOSPADM

## 2019-03-18 RX ORDER — ONDANSETRON 2 MG/ML
4 INJECTION INTRAMUSCULAR; INTRAVENOUS
Status: COMPLETED | OUTPATIENT
Start: 2019-03-18 | End: 2019-03-18

## 2019-03-18 RX ORDER — HYDROMORPHONE HCL 110MG/55ML
PATIENT CONTROLLED ANALGESIA SYRINGE INTRAVENOUS PRN
Status: DISCONTINUED | OUTPATIENT
Start: 2019-03-18 | End: 2019-03-18 | Stop reason: SDUPTHER

## 2019-03-18 RX ORDER — LIDOCAINE HYDROCHLORIDE 20 MG/ML
INJECTION, SOLUTION EPIDURAL; INFILTRATION; INTRACAUDAL; PERINEURAL PRN
Status: DISCONTINUED | OUTPATIENT
Start: 2019-03-18 | End: 2019-03-18 | Stop reason: SDUPTHER

## 2019-03-18 RX ORDER — OXYCODONE HYDROCHLORIDE AND ACETAMINOPHEN 5; 325 MG/1; MG/1
1 TABLET ORAL
Status: DISCONTINUED | OUTPATIENT
Start: 2019-03-18 | End: 2019-03-18 | Stop reason: HOSPADM

## 2019-03-18 RX ORDER — APREPITANT 40 MG/1
40 CAPSULE ORAL ONCE
Status: COMPLETED | OUTPATIENT
Start: 2019-03-18 | End: 2019-03-18

## 2019-03-18 RX ORDER — ROCURONIUM BROMIDE 10 MG/ML
INJECTION, SOLUTION INTRAVENOUS PRN
Status: DISCONTINUED | OUTPATIENT
Start: 2019-03-18 | End: 2019-03-18 | Stop reason: SDUPTHER

## 2019-03-18 RX ORDER — KETOROLAC TROMETHAMINE 30 MG/ML
INJECTION, SOLUTION INTRAMUSCULAR; INTRAVENOUS PRN
Status: DISCONTINUED | OUTPATIENT
Start: 2019-03-18 | End: 2019-03-18 | Stop reason: SDUPTHER

## 2019-03-18 RX ORDER — IBUPROFEN 200 MG
600 TABLET ORAL EVERY 6 HOURS PRN
Qty: 25 TABLET | Refills: 1 | Status: SHIPPED | OUTPATIENT
Start: 2019-03-18 | End: 2019-05-27

## 2019-03-18 RX ORDER — OXYCODONE HYDROCHLORIDE AND ACETAMINOPHEN 5; 325 MG/1; MG/1
2 TABLET ORAL PRN
Status: COMPLETED | OUTPATIENT
Start: 2019-03-18 | End: 2019-03-18

## 2019-03-18 RX ORDER — SODIUM CHLORIDE, SODIUM LACTATE, POTASSIUM CHLORIDE, AND CALCIUM CHLORIDE .6; .31; .03; .02 G/100ML; G/100ML; G/100ML; G/100ML
IRRIGANT IRRIGATION PRN
Status: DISCONTINUED | OUTPATIENT
Start: 2019-03-18 | End: 2019-03-18 | Stop reason: ALTCHOICE

## 2019-03-18 RX ORDER — FENTANYL CITRATE 50 UG/ML
25 INJECTION, SOLUTION INTRAMUSCULAR; INTRAVENOUS EVERY 5 MIN PRN
Status: DISCONTINUED | OUTPATIENT
Start: 2019-03-18 | End: 2019-03-18 | Stop reason: HOSPADM

## 2019-03-18 RX ORDER — OXYCODONE HYDROCHLORIDE AND ACETAMINOPHEN 5; 325 MG/1; MG/1
1 TABLET ORAL EVERY 4 HOURS PRN
Qty: 12 TABLET | Refills: 0 | Status: SHIPPED | OUTPATIENT
Start: 2019-03-18 | End: 2019-03-21

## 2019-03-18 RX ORDER — DEXAMETHASONE SODIUM PHOSPHATE 4 MG/ML
INJECTION, SOLUTION INTRA-ARTICULAR; INTRALESIONAL; INTRAMUSCULAR; INTRAVENOUS; SOFT TISSUE PRN
Status: DISCONTINUED | OUTPATIENT
Start: 2019-03-18 | End: 2019-03-18 | Stop reason: SDUPTHER

## 2019-03-18 RX ADMIN — LIDOCAINE HYDROCHLORIDE 70 MG: 20 INJECTION, SOLUTION EPIDURAL; INFILTRATION; INTRACAUDAL; PERINEURAL at 07:45

## 2019-03-18 RX ADMIN — HYDROMORPHONE HYDROCHLORIDE 0.5 MG: 1 INJECTION, SOLUTION INTRAMUSCULAR; INTRAVENOUS; SUBCUTANEOUS at 11:14

## 2019-03-18 RX ADMIN — ROCURONIUM BROMIDE 50 MG: 10 SOLUTION INTRAVENOUS at 07:45

## 2019-03-18 RX ADMIN — ROCURONIUM BROMIDE 15 MG: 10 SOLUTION INTRAVENOUS at 08:45

## 2019-03-18 RX ADMIN — HYDROMORPHONE HYDROCHLORIDE 0.25 MG: 1 INJECTION, SOLUTION INTRAMUSCULAR; INTRAVENOUS; SUBCUTANEOUS at 11:29

## 2019-03-18 RX ADMIN — SODIUM CHLORIDE, POTASSIUM CHLORIDE, SODIUM LACTATE AND CALCIUM CHLORIDE: 600; 310; 30; 20 INJECTION, SOLUTION INTRAVENOUS at 10:42

## 2019-03-18 RX ADMIN — ROCURONIUM BROMIDE 10 MG: 10 SOLUTION INTRAVENOUS at 09:33

## 2019-03-18 RX ADMIN — PROPOFOL 200 MG: 10 INJECTION, EMULSION INTRAVENOUS at 07:45

## 2019-03-18 RX ADMIN — HYDROMORPHONE HYDROCHLORIDE 0.6 MG: 2 INJECTION INTRAMUSCULAR; INTRAVENOUS; SUBCUTANEOUS at 09:52

## 2019-03-18 RX ADMIN — HYDROMORPHONE HYDROCHLORIDE 0.6 MG: 2 INJECTION INTRAMUSCULAR; INTRAVENOUS; SUBCUTANEOUS at 09:45

## 2019-03-18 RX ADMIN — APREPITANT 40 MG: 40 CAPSULE ORAL at 07:18

## 2019-03-18 RX ADMIN — SODIUM CHLORIDE, POTASSIUM CHLORIDE, SODIUM LACTATE AND CALCIUM CHLORIDE: 600; 310; 30; 20 INJECTION, SOLUTION INTRAVENOUS at 07:17

## 2019-03-18 RX ADMIN — Medication 2 G: at 07:40

## 2019-03-18 RX ADMIN — HYDROMORPHONE HYDROCHLORIDE 0.4 MG: 2 INJECTION INTRAMUSCULAR; INTRAVENOUS; SUBCUTANEOUS at 10:00

## 2019-03-18 RX ADMIN — HYDROMORPHONE HYDROCHLORIDE 0.4 MG: 2 INJECTION INTRAMUSCULAR; INTRAVENOUS; SUBCUTANEOUS at 07:45

## 2019-03-18 RX ADMIN — SODIUM CHLORIDE, POTASSIUM CHLORIDE, SODIUM LACTATE AND CALCIUM CHLORIDE: 600; 310; 30; 20 INJECTION, SOLUTION INTRAVENOUS at 08:54

## 2019-03-18 RX ADMIN — OXYCODONE AND ACETAMINOPHEN 1 TABLET: 5; 325 TABLET ORAL at 12:20

## 2019-03-18 RX ADMIN — ONDANSETRON 4 MG: 2 INJECTION INTRAMUSCULAR; INTRAVENOUS at 13:05

## 2019-03-18 RX ADMIN — ONDANSETRON 4 MG: 2 INJECTION INTRAMUSCULAR; INTRAVENOUS at 09:45

## 2019-03-18 RX ADMIN — DEXAMETHASONE SODIUM PHOSPHATE 8 MG: 4 INJECTION, SOLUTION INTRAMUSCULAR; INTRAVENOUS at 09:45

## 2019-03-18 RX ADMIN — ACETAMINOPHEN 1500 MG: 500 TABLET ORAL at 07:19

## 2019-03-18 RX ADMIN — SUGAMMADEX 150 MG: 100 INJECTION, SOLUTION INTRAVENOUS at 09:49

## 2019-03-18 RX ADMIN — KETOROLAC TROMETHAMINE 30 MG: 30 INJECTION, SOLUTION INTRAMUSCULAR; INTRAVENOUS at 09:45

## 2019-03-18 ASSESSMENT — PULMONARY FUNCTION TESTS
PIF_VALUE: 17
PIF_VALUE: 28
PIF_VALUE: 27
PIF_VALUE: 16
PIF_VALUE: 29
PIF_VALUE: 28
PIF_VALUE: 10
PIF_VALUE: 28
PIF_VALUE: 1
PIF_VALUE: 0
PIF_VALUE: 18
PIF_VALUE: 27
PIF_VALUE: 29
PIF_VALUE: 29
PIF_VALUE: 30
PIF_VALUE: 19
PIF_VALUE: 28
PIF_VALUE: 18
PIF_VALUE: 28
PIF_VALUE: 18
PIF_VALUE: 29
PIF_VALUE: 28
PIF_VALUE: 3
PIF_VALUE: 18
PIF_VALUE: 2
PIF_VALUE: 28
PIF_VALUE: 3
PIF_VALUE: 0
PIF_VALUE: 4
PIF_VALUE: 29
PIF_VALUE: 0
PIF_VALUE: 30
PIF_VALUE: 27
PIF_VALUE: 28
PIF_VALUE: 18
PIF_VALUE: 27
PIF_VALUE: 18
PIF_VALUE: 29
PIF_VALUE: 17
PIF_VALUE: 3
PIF_VALUE: 17
PIF_VALUE: 6
PIF_VALUE: 15
PIF_VALUE: 1
PIF_VALUE: 3
PIF_VALUE: 2
PIF_VALUE: 19
PIF_VALUE: 29
PIF_VALUE: 3
PIF_VALUE: 18
PIF_VALUE: 0
PIF_VALUE: 18
PIF_VALUE: 23
PIF_VALUE: 0
PIF_VALUE: 29
PIF_VALUE: 17
PIF_VALUE: 18
PIF_VALUE: 27
PIF_VALUE: 20
PIF_VALUE: 18
PIF_VALUE: 25
PIF_VALUE: 17
PIF_VALUE: 24
PIF_VALUE: 27
PIF_VALUE: 17
PIF_VALUE: 25
PIF_VALUE: 29
PIF_VALUE: 3
PIF_VALUE: 18
PIF_VALUE: 28
PIF_VALUE: 19
PIF_VALUE: 0
PIF_VALUE: 28
PIF_VALUE: 16
PIF_VALUE: 29
PIF_VALUE: 27
PIF_VALUE: 29
PIF_VALUE: 26
PIF_VALUE: 18
PIF_VALUE: 28
PIF_VALUE: 3
PIF_VALUE: 3
PIF_VALUE: 28
PIF_VALUE: 29
PIF_VALUE: 28
PIF_VALUE: 26
PIF_VALUE: 3
PIF_VALUE: 29
PIF_VALUE: 25
PIF_VALUE: 28
PIF_VALUE: 29
PIF_VALUE: 29
PIF_VALUE: 18
PIF_VALUE: 18
PIF_VALUE: 17
PIF_VALUE: 23
PIF_VALUE: 29
PIF_VALUE: 26
PIF_VALUE: 27
PIF_VALUE: 3
PIF_VALUE: 24
PIF_VALUE: 18
PIF_VALUE: 19
PIF_VALUE: 27
PIF_VALUE: 11
PIF_VALUE: 26
PIF_VALUE: 30
PIF_VALUE: 19
PIF_VALUE: 24
PIF_VALUE: 18
PIF_VALUE: 25
PIF_VALUE: 0
PIF_VALUE: 23
PIF_VALUE: 29
PIF_VALUE: 29
PIF_VALUE: 16
PIF_VALUE: 18
PIF_VALUE: 28
PIF_VALUE: 3
PIF_VALUE: 28
PIF_VALUE: 27
PIF_VALUE: 4
PIF_VALUE: 17
PIF_VALUE: 27
PIF_VALUE: 29
PIF_VALUE: 17
PIF_VALUE: 1
PIF_VALUE: 0
PIF_VALUE: 29
PIF_VALUE: 18
PIF_VALUE: 29
PIF_VALUE: 29
PIF_VALUE: 22
PIF_VALUE: 23
PIF_VALUE: 16
PIF_VALUE: 30
PIF_VALUE: 17
PIF_VALUE: 2
PIF_VALUE: 0
PIF_VALUE: 18
PIF_VALUE: 26
PIF_VALUE: 19
PIF_VALUE: 29
PIF_VALUE: 28
PIF_VALUE: 19
PIF_VALUE: 3
PIF_VALUE: 3
PIF_VALUE: 28

## 2019-03-18 ASSESSMENT — PAIN SCALES - GENERAL
PAINLEVEL_OUTOF10: 5
PAINLEVEL_OUTOF10: 7
PAINLEVEL_OUTOF10: 7
PAINLEVEL_OUTOF10: 2
PAINLEVEL_OUTOF10: 0

## 2019-03-18 ASSESSMENT — PAIN - FUNCTIONAL ASSESSMENT: PAIN_FUNCTIONAL_ASSESSMENT: 0-10

## 2019-04-16 RX ORDER — ELETRIPTAN HYDROBROMIDE 40 MG/1
TABLET, FILM COATED ORAL
Qty: 6 TABLET | Refills: 2 | Status: SHIPPED | OUTPATIENT
Start: 2019-04-16 | End: 2019-05-28 | Stop reason: SDUPTHER

## 2019-04-26 ENCOUNTER — OFFICE VISIT (OUTPATIENT)
Dept: FAMILY MEDICINE CLINIC | Age: 34
End: 2019-04-26
Payer: COMMERCIAL

## 2019-04-26 VITALS
SYSTOLIC BLOOD PRESSURE: 130 MMHG | DIASTOLIC BLOOD PRESSURE: 70 MMHG | OXYGEN SATURATION: 98 % | WEIGHT: 192.4 LBS | BODY MASS INDEX: 29.25 KG/M2 | HEART RATE: 70 BPM | TEMPERATURE: 98.1 F

## 2019-04-26 DIAGNOSIS — B97.89 SORE THROAT (VIRAL): Primary | ICD-10-CM

## 2019-04-26 DIAGNOSIS — J02.8 SORE THROAT (VIRAL): Primary | ICD-10-CM

## 2019-04-26 LAB — S PYO AG THROAT QL: NORMAL

## 2019-04-26 PROCEDURE — 87880 STREP A ASSAY W/OPTIC: CPT | Performed by: NURSE PRACTITIONER

## 2019-04-26 PROCEDURE — 99213 OFFICE O/P EST LOW 20 MIN: CPT | Performed by: NURSE PRACTITIONER

## 2019-04-26 RX ORDER — SPIRONOLACTONE 25 MG/1
25 TABLET ORAL DAILY
Status: ON HOLD | COMMUNITY
End: 2022-07-27

## 2019-04-26 ASSESSMENT — ENCOUNTER SYMPTOMS
EYES NEGATIVE: 1
SORE THROAT: 1
RESPIRATORY NEGATIVE: 1
GASTROINTESTINAL NEGATIVE: 1

## 2019-04-26 NOTE — PROGRESS NOTES
Subjective:      Patient ID: Jodell Lesches is a 26 y.o. female. HPI    Chief Complaint   Patient presents with    Pharyngitis     Sore Throat  Patient complains of sore throat. Associated symptoms include nasal blockage, post nasal drip and sore throat. Onset of symptoms was 1 day ago, gradually worsening since that time. She is drinking plenty of fluids. She has not had recent close exposure to someone with proven streptococcal pharyngitis. Patient also c/o left ear pain. Patient denies fevers or chills. Review of Systems   Constitutional: Negative for appetite change, chills and fever. HENT: Positive for postnasal drip and sore throat. Eyes: Negative. Respiratory: Negative. Cardiovascular: Negative. Gastrointestinal: Negative. Endocrine: Negative. Genitourinary: Negative. Neurological: Negative. Hematological: Negative. Psychiatric/Behavioral: Negative.         Patient Active Problem List   Diagnosis    Migraine with aura and without status migrainosus, not intractable    Interstitial cystitis    Allergic sinusitis    Kidney stones    PCOS (polycystic ovarian syndrome)    Endogenous depression (HCC)    Anxiety    Decreased fetal movement affecting pregnancy, antepartum    Abnormal uterine bleeding       Outpatient Medications Marked as Taking for the 4/26/19 encounter (Office Visit) with PERLA Smith CNP   Medication Sig Dispense Refill    spironolactone (ALDACTONE) 25 MG tablet Take 25 mg by mouth daily         Allergies   Allergen Reactions    Topamax [Topiramate] Other (See Comments)     Kidney stones    Cymbalta [Duloxetine Hcl] Other (See Comments)     moodiness       Social History     Tobacco Use    Smoking status: Never Smoker    Smokeless tobacco: Never Used   Substance Use Topics    Alcohol use: No       Objective:   /70   Pulse 70   Temp 98.1 °F (36.7 °C) (Oral)   Wt 192 lb 6.4 oz (87.3 kg)   LMP 03/13/2019   SpO2 98%   BMI 29.25 kg/m²     Physical Exam   Constitutional: She is oriented to person, place, and time. She appears well-developed and well-nourished. She is cooperative. She does not have a sickly appearance. No distress. HENT:   Head: Normocephalic and atraumatic. Mouth/Throat: Uvula is midline and mucous membranes are normal. Posterior oropharyngeal erythema present. No oropharyngeal exudate or posterior oropharyngeal edema. Eyes: Conjunctivae and EOM are normal.   Neck: Normal range of motion. Neck supple. Cardiovascular: Normal rate, regular rhythm, S1 normal, S2 normal and normal heart sounds. Pulmonary/Chest: Effort normal and breath sounds normal. No accessory muscle usage. No respiratory distress. Abdominal: Soft. Bowel sounds are normal.   Musculoskeletal: Normal range of motion. Lymphadenopathy:     She has no cervical adenopathy. Neurological: She is alert and oriented to person, place, and time. Skin: Skin is warm and dry. Psychiatric: She has a normal mood and affect. Her speech is normal and behavior is normal.       Assessment/Plan:   1. Sore throat (viral)  Patient presents today with complaints of sore throat for less than 1 day. Patient reports blisters and throat however denies feeling ill, fever, chills, nasal congestion, sinus pressure or cough. On exam noted mild oropharyngeal erythema, primarily on the left side otherwise exam is benign. Rapid strep is negative. I suspect symptoms are viral in nature and advised patient to do warm salt water gargles 2-3 times a day, drink plenty of fluids and patient to follow-up if no better or worsening of symptoms. Patient verbalized understanding and agreeable to plan.   - POCT rapid strep A

## 2019-04-26 NOTE — PATIENT INSTRUCTIONS
Please read the healthy family handout that you were given and share it with your family. Please compare this printed medication list with your medications at home to be sure they are the same. If you have any medications that are different please contact us immediately at [phone]. Also review your allergies that we have listed, these may also include medications that you have not been able to tolerate, make sure everything listed is correct. If you have any allergies that are different please contact us immediately at [phone]. Patient Education     Drink plenty of fluids, do warm salt water gargles and Patient to f/u if no better or worsening of symptoms. Sore Throat: Care Instructions  Your Care Instructions    Infection by bacteria or a virus causes most sore throats. Cigarette smoke, dry air, air pollution, allergies, and yelling can also cause a sore throat. Sore throats can be painful and annoying. Fortunately, most sore throats go away on their own. If you have a bacterial infection, your doctor may prescribe antibiotics. Follow-up care is a key part of your treatment and safety. Be sure to make and go to all appointments, and call your doctor if you are having problems. It's also a good idea to know your test results and keep a list of the medicines you take. How can you care for yourself at home? · If your doctor prescribed antibiotics, take them as directed. Do not stop taking them just because you feel better. You need to take the full course of antibiotics. · Gargle with warm salt water once an hour to help reduce swelling and relieve discomfort. Use 1 teaspoon of salt mixed in 1 cup of warm water. · Take an over-the-counter pain medicine, such as acetaminophen (Tylenol), ibuprofen (Advil, Motrin), or naproxen (Aleve). Read and follow all instructions on the label. · Be careful when taking over-the-counter cold or flu medicines and Tylenol at the same time.  Many of these

## 2019-05-28 ENCOUNTER — OFFICE VISIT (OUTPATIENT)
Dept: FAMILY MEDICINE CLINIC | Age: 34
End: 2019-05-28
Payer: COMMERCIAL

## 2019-05-28 VITALS
TEMPERATURE: 98.8 F | HEART RATE: 95 BPM | BODY MASS INDEX: 28.86 KG/M2 | DIASTOLIC BLOOD PRESSURE: 64 MMHG | SYSTOLIC BLOOD PRESSURE: 98 MMHG | WEIGHT: 189.8 LBS | OXYGEN SATURATION: 98 %

## 2019-05-28 DIAGNOSIS — J01.90 ACUTE BACTERIAL SINUSITIS: ICD-10-CM

## 2019-05-28 DIAGNOSIS — F41.9 ANXIETY: ICD-10-CM

## 2019-05-28 DIAGNOSIS — G43.109 MIGRAINE WITH AURA AND WITHOUT STATUS MIGRAINOSUS, NOT INTRACTABLE: Primary | ICD-10-CM

## 2019-05-28 DIAGNOSIS — R41.3 MEMORY LOSS: ICD-10-CM

## 2019-05-28 DIAGNOSIS — B96.89 ACUTE BACTERIAL SINUSITIS: ICD-10-CM

## 2019-05-28 PROBLEM — N93.9 ABNORMAL UTERINE BLEEDING: Status: RESOLVED | Noted: 2019-03-18 | Resolved: 2019-05-28

## 2019-05-28 PROBLEM — O36.8190 DECREASED FETAL MOVEMENT AFFECTING PREGNANCY, ANTEPARTUM: Status: RESOLVED | Noted: 2018-06-23 | Resolved: 2019-05-28

## 2019-05-28 LAB
BASOPHILS ABSOLUTE: 0 K/UL (ref 0–0.2)
BASOPHILS RELATIVE PERCENT: 0.2 %
EOSINOPHILS ABSOLUTE: 0.4 K/UL (ref 0–0.6)
EOSINOPHILS RELATIVE PERCENT: 3.8 %
HCT VFR BLD CALC: 40.6 % (ref 36–48)
HEMOGLOBIN: 13.5 G/DL (ref 12–16)
LYMPHOCYTES ABSOLUTE: 1.6 K/UL (ref 1–5.1)
LYMPHOCYTES RELATIVE PERCENT: 15.2 %
MCH RBC QN AUTO: 28.6 PG (ref 26–34)
MCHC RBC AUTO-ENTMCNC: 33.3 G/DL (ref 31–36)
MCV RBC AUTO: 86 FL (ref 80–100)
MONOCYTES ABSOLUTE: 1.1 K/UL (ref 0–1.3)
MONOCYTES RELATIVE PERCENT: 10.4 %
NEUTROPHILS ABSOLUTE: 7.2 K/UL (ref 1.7–7.7)
NEUTROPHILS RELATIVE PERCENT: 70.4 %
PDW BLD-RTO: 13 % (ref 12.4–15.4)
PLATELET # BLD: 287 K/UL (ref 135–450)
PMV BLD AUTO: 7.9 FL (ref 5–10.5)
RBC # BLD: 4.72 M/UL (ref 4–5.2)
WBC # BLD: 10.2 K/UL (ref 4–11)

## 2019-05-28 PROCEDURE — 99214 OFFICE O/P EST MOD 30 MIN: CPT | Performed by: FAMILY MEDICINE

## 2019-05-28 RX ORDER — ELETRIPTAN HYDROBROMIDE 40 MG/1
TABLET, FILM COATED ORAL
Qty: 6 TABLET | Refills: 2 | Status: SHIPPED | OUTPATIENT
Start: 2019-05-28 | End: 2019-10-23 | Stop reason: SDUPTHER

## 2019-05-28 RX ORDER — FLUCONAZOLE 150 MG/1
150 TABLET ORAL DAILY
Qty: 1 TABLET | Refills: 0 | Status: SHIPPED | OUTPATIENT
Start: 2019-05-28 | End: 2019-05-29

## 2019-05-28 RX ORDER — AMOXICILLIN 500 MG/1
1000 CAPSULE ORAL 2 TIMES DAILY
Qty: 40 CAPSULE | Refills: 0 | Status: SHIPPED | OUTPATIENT
Start: 2019-05-28 | End: 2019-06-07

## 2019-05-28 NOTE — PATIENT INSTRUCTIONS
Please read the healthy family handout that you were given and share it with your family. Please compare this printed medication list with your medications at home to be sure they are the same. If you have any medications that are different please contact us immediately at [phone]. Also review your allergies that we have listed, these may also include medications that you have not been able to tolerate, make sure everything listed is correct. If you have any allergies that are different please contact us immediately at [phone].

## 2019-05-28 NOTE — PROGRESS NOTES
Topamax [Topiramate] Other (See Comments)     Kidney stones    Cymbalta [Duloxetine Hcl] Other (See Comments)     moodiness     Past history:  In March she had a hysterectomy for her dysfunctional uterine bleeding. Review of systems:  Constitutional:  fatigue - no                                                  abnormal weight loss - no  Cardiac:  chest pain or discomfort -yes, one day last week but none since                 lightheadedness -yes, as above    Objective  BP 98/64   Pulse 95   Temp 98.8 °F (37.1 °C) (Oral)   Wt 189 lb 12.8 oz (86.1 kg)   SpO2 98%   BMI 28.86 kg/m²   Patient is alert, oriented, and in no acute distress  Psych:  mood and affect are unremarkable               speech and thought processes appear intact               Behavior and appearance unremarkable  Eyes: Sclera lids lashes clear but there is some suborbital edema  ENT: Noted for irritated and red pharynx  Neck:  No masses, trachea midline, phonation normal   Thyroid - unremarkable              Cervical  adenopathy - nothing significant  Chest:  No deformity of thorax               Respirations easy and unlabored              Lungs - clear to auscultation     Breath sounds - equal  Heart:  Regular rhythm with no murmur rub or gallop.     Brittni Tsang MD    Current Outpatient Medications   Medication Sig Dispense Refill    amoxicillin (AMOXIL) 500 MG capsule Take 2 capsules by mouth 2 times daily for 10 days 40 capsule 0    fluconazole (DIFLUCAN) 150 MG tablet Take 1 tablet by mouth daily for 1 day 1 tablet 0    eletriptan (RELPAX) 40 MG tablet TAKE ONE-HALF TO ONE TABLET BY MOUTH ONCE AS NEEDED FOR MIGRAINE MAY REPEAT IN 2 HOURS IF NEEDED 6 tablet 2    spironolactone (ALDACTONE) 25 MG tablet Take 25 mg by mouth daily      busPIRone (BUSPAR) 5 MG tablet Take 1-2 tablets by mouth 3 times daily as needed (anxiety) (Patient taking differently: Take 5-10 mg by mouth 3 times daily as needed (anxiety) Just prescribed yesterday 10/7/18 but hasn't started yet) 90 tablet 2     No current facility-administered medications for this visit. The note was completed using Dragon voice recognition transcription. Every effort was made to ensure accuracy; however, inadvertent  transcription errors may be present despite my best efforts to edit errors.

## 2019-05-29 LAB
A/G RATIO: 1.8 (ref 1.1–2.2)
ALBUMIN SERPL-MCNC: 4.8 G/DL (ref 3.4–5)
ALP BLD-CCNC: 87 U/L (ref 40–129)
ALT SERPL-CCNC: 14 U/L (ref 10–40)
ANION GAP SERPL CALCULATED.3IONS-SCNC: 14 MMOL/L (ref 3–16)
AST SERPL-CCNC: 17 U/L (ref 15–37)
BILIRUB SERPL-MCNC: 0.5 MG/DL (ref 0–1)
BUN BLDV-MCNC: 14 MG/DL (ref 7–20)
CALCIUM SERPL-MCNC: 9.3 MG/DL (ref 8.3–10.6)
CHLORIDE BLD-SCNC: 101 MMOL/L (ref 99–110)
CO2: 24 MMOL/L (ref 21–32)
CREAT SERPL-MCNC: 0.6 MG/DL (ref 0.6–1.1)
GFR AFRICAN AMERICAN: >60
GFR NON-AFRICAN AMERICAN: >60
GLOBULIN: 2.7 G/DL
GLUCOSE BLD-MCNC: 97 MG/DL (ref 70–99)
POTASSIUM SERPL-SCNC: 3.8 MMOL/L (ref 3.5–5.1)
SODIUM BLD-SCNC: 139 MMOL/L (ref 136–145)
TOTAL PROTEIN: 7.5 G/DL (ref 6.4–8.2)
TSH REFLEX: 1.19 UIU/ML (ref 0.27–4.2)
VITAMIN B-12: 494 PG/ML (ref 211–911)

## 2019-06-17 ENCOUNTER — TELEPHONE (OUTPATIENT)
Dept: FAMILY MEDICINE CLINIC | Age: 34
End: 2019-06-17

## 2019-06-17 RX ORDER — DOXYCYCLINE HYCLATE 100 MG
100 TABLET ORAL 2 TIMES DAILY
Qty: 20 TABLET | Refills: 0 | Status: SHIPPED | OUTPATIENT
Start: 2019-06-17 | End: 2019-06-27

## 2019-06-17 NOTE — TELEPHONE ENCOUNTER
Patient was seen at end of May with sinus infection,  She was prescribed amoxil. , she said symptoms have still not completely cleared up and wanted to see if you could call in something else for her

## 2019-10-11 ENCOUNTER — TELEPHONE (OUTPATIENT)
Dept: FAMILY MEDICINE CLINIC | Age: 34
End: 2019-10-11

## 2019-10-23 ENCOUNTER — OFFICE VISIT (OUTPATIENT)
Dept: FAMILY MEDICINE CLINIC | Age: 34
End: 2019-10-23
Payer: COMMERCIAL

## 2019-10-23 VITALS
TEMPERATURE: 99 F | OXYGEN SATURATION: 99 % | BODY MASS INDEX: 30.2 KG/M2 | DIASTOLIC BLOOD PRESSURE: 65 MMHG | HEART RATE: 70 BPM | SYSTOLIC BLOOD PRESSURE: 110 MMHG | WEIGHT: 198.6 LBS

## 2019-10-23 DIAGNOSIS — F41.9 ANXIETY: ICD-10-CM

## 2019-10-23 DIAGNOSIS — G43.109 MIGRAINE WITH AURA AND WITHOUT STATUS MIGRAINOSUS, NOT INTRACTABLE: Primary | ICD-10-CM

## 2019-10-23 PROCEDURE — 99213 OFFICE O/P EST LOW 20 MIN: CPT | Performed by: FAMILY MEDICINE

## 2019-10-23 RX ORDER — ELETRIPTAN HYDROBROMIDE 40 MG/1
TABLET, FILM COATED ORAL
Qty: 6 TABLET | Refills: 5 | Status: SHIPPED | OUTPATIENT
Start: 2019-10-23 | End: 2020-10-19 | Stop reason: SDUPTHER

## 2019-10-23 RX ORDER — FLUOXETINE 10 MG/1
10 CAPSULE ORAL DAILY
Qty: 30 CAPSULE | Refills: 5 | Status: SHIPPED | OUTPATIENT
Start: 2019-10-23 | End: 2020-05-01 | Stop reason: ALTCHOICE

## 2019-10-24 ENCOUNTER — TELEPHONE (OUTPATIENT)
Dept: FAMILY MEDICINE CLINIC | Age: 34
End: 2019-10-24

## 2019-12-16 ENCOUNTER — OFFICE VISIT (OUTPATIENT)
Dept: FAMILY MEDICINE CLINIC | Age: 34
End: 2019-12-16
Payer: COMMERCIAL

## 2019-12-16 VITALS
WEIGHT: 196.2 LBS | HEART RATE: 67 BPM | SYSTOLIC BLOOD PRESSURE: 109 MMHG | OXYGEN SATURATION: 98 % | DIASTOLIC BLOOD PRESSURE: 75 MMHG | TEMPERATURE: 98 F | BODY MASS INDEX: 29.83 KG/M2

## 2019-12-16 DIAGNOSIS — J01.90 ACUTE BACTERIAL SINUSITIS: Primary | ICD-10-CM

## 2019-12-16 DIAGNOSIS — B96.89 ACUTE BACTERIAL SINUSITIS: Primary | ICD-10-CM

## 2019-12-16 DIAGNOSIS — B07.0 PLANTAR WART OF LEFT FOOT: ICD-10-CM

## 2019-12-16 DIAGNOSIS — G43.109 MIGRAINE WITH AURA AND WITHOUT STATUS MIGRAINOSUS, NOT INTRACTABLE: ICD-10-CM

## 2019-12-16 DIAGNOSIS — R20.9 DISTURBANCE OF SKIN SENSATION: ICD-10-CM

## 2019-12-16 PROCEDURE — 17110 DESTRUCTION B9 LES UP TO 14: CPT | Performed by: FAMILY MEDICINE

## 2019-12-16 PROCEDURE — 99213 OFFICE O/P EST LOW 20 MIN: CPT | Performed by: FAMILY MEDICINE

## 2019-12-16 RX ORDER — AZITHROMYCIN 250 MG/1
TABLET, FILM COATED ORAL
Qty: 6 TABLET | Refills: 0 | Status: SHIPPED | OUTPATIENT
Start: 2019-12-16 | End: 2019-12-26

## 2020-02-27 ENCOUNTER — TELEPHONE (OUTPATIENT)
Dept: ADMINISTRATIVE | Age: 35
End: 2020-02-27

## 2020-03-23 ENCOUNTER — TELEPHONE (OUTPATIENT)
Dept: FAMILY MEDICINE CLINIC | Age: 35
End: 2020-03-23

## 2020-03-23 RX ORDER — AZITHROMYCIN 250 MG/1
250 TABLET, FILM COATED ORAL SEE ADMIN INSTRUCTIONS
Qty: 6 TABLET | Refills: 0 | Status: SHIPPED | OUTPATIENT
Start: 2020-03-23 | End: 2022-10-19 | Stop reason: SDUPTHER

## 2020-05-01 ENCOUNTER — VIRTUAL VISIT (OUTPATIENT)
Dept: FAMILY MEDICINE CLINIC | Age: 35
End: 2020-05-01
Payer: COMMERCIAL

## 2020-05-01 PROCEDURE — 99213 OFFICE O/P EST LOW 20 MIN: CPT | Performed by: FAMILY MEDICINE

## 2020-05-01 RX ORDER — FLUOXETINE 10 MG/1
10 CAPSULE ORAL DAILY
Qty: 30 CAPSULE | Refills: 5 | Status: SHIPPED
Start: 2020-05-01 | End: 2020-10-19 | Stop reason: DRUGHIGH

## 2020-05-01 RX ORDER — FLUCONAZOLE 100 MG/1
100 TABLET ORAL DAILY
Qty: 7 TABLET | Refills: 0 | Status: SHIPPED | OUTPATIENT
Start: 2020-05-01 | End: 2022-10-19 | Stop reason: SDUPTHER

## 2020-05-01 RX ORDER — DOXYCYCLINE HYCLATE 100 MG
100 TABLET ORAL 2 TIMES DAILY
Qty: 20 TABLET | Refills: 0 | Status: SHIPPED | OUTPATIENT
Start: 2020-05-01 | End: 2020-05-11

## 2020-05-01 NOTE — PROGRESS NOTES
2020    TELEHEALTH EVALUATION -- Audio/Visual (During AJDPK-19 public health emergency) using [employer]. me. ASSESSMENT and PLAN    1. Anxiety  - FLUoxetine (PROZAC) 10 MG capsule; Take 1 capsule by mouth daily For anxiety;  call in 2 to 3 weeks if no improvement    2. Acute bacterial sinusitis  - doxycycline hyclate (VIBRA-TABS) 100 MG tablet; Take 1 tablet by mouth 2 times daily for 10 days  Dispense: 20 tablet; Refill: 0  -Backup Diflucan prescription sent in, recommended Mucinex D and nightly Afrin for no more than 7 nights. RTC prn or call/return if symptoms don't resolve as discussed    Deandre Diaz MD    History:    José Luis Chow (:  1985) has requested an audio/video evaluation for the following concern(s): Sinusitis and anxiety. She has progressive sinus congestion purulent nasal drainage postnasal drainage and coughing up purulent sputum but no shortness of breath chest pain chest congestion or wheezing. She has had a low-grade fever no higher than 99.3. Last October I gave her 10 mg of fluoxetine for stress anxiety. 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 Current medications reviewed as listed below. Social history: as below     PHYSICAL EXAMINATION:    Vital Signs: (As obtained by patient/caregiver or practitioner observation): None obtained   pulse oximetry: NA     Constitutional:   Appears well-developed and well-nourished   No apparent distress     Alert and awake      Psychiatric:       mood and affect are normal               speech and thought processes are intact               appearance and behavior are unremarkable    Eyes:  EOM are intact  Sclera, lids and lashes are clear          No discharge noted    HENT:   Normocephalic, atraumatic.     Mouth/Throat: Mucous membranes are moist.     External Ears:  Normal      Neck:   No visualized mass     Pulmonary/Chest:   Respiratory effort normal, no

## 2020-05-28 ENCOUNTER — TELEPHONE (OUTPATIENT)
Dept: ADMINISTRATIVE | Age: 35
End: 2020-05-28

## 2020-08-06 ENCOUNTER — TELEPHONE (OUTPATIENT)
Dept: FAMILY MEDICINE CLINIC | Age: 35
End: 2020-08-06

## 2020-08-06 ENCOUNTER — NURSE TRIAGE (OUTPATIENT)
Dept: OTHER | Facility: CLINIC | Age: 35
End: 2020-08-06

## 2020-08-06 RX ORDER — AMOXICILLIN 500 MG/1
500 CAPSULE ORAL 2 TIMES DAILY
Qty: 20 CAPSULE | Refills: 0 | Status: SHIPPED | OUTPATIENT
Start: 2020-08-06 | End: 2020-08-16

## 2020-08-06 NOTE — TELEPHONE ENCOUNTER
Rx sent to [facility] in [facility].  Tried to call patient wireless number is currently not accepting calls

## 2020-08-17 ENCOUNTER — TELEPHONE (OUTPATIENT)
Dept: FAMILY MEDICINE CLINIC | Age: 35
End: 2020-08-17

## 2020-08-19 NOTE — TELEPHONE ENCOUNTER
Where would you like me to schedule this pt since she didn't come in yesterday ? She wanted to be seen because she has a lump on her arm & a new pt.

## 2020-08-19 NOTE — TELEPHONE ENCOUNTER
I can see her at [facility] on Friday for the lump. Will need to schedule new patient physical at a later time.

## 2020-08-20 NOTE — TELEPHONE ENCOUNTER
Left patient voice message that we scheduled her for 8/21 at 9am for the lump on her arm only. She will need to establish as a new pt. at a later date.

## 2020-10-05 RX ORDER — GALCANEZUMAB 120 MG/ML
120 INJECTION, SOLUTION SUBCUTANEOUS
Qty: 1 PEN | Refills: 0 | Status: SHIPPED | OUTPATIENT
Start: 2020-10-05 | End: 2020-10-19 | Stop reason: SDUPTHER

## 2020-10-07 ENCOUNTER — TELEPHONE (OUTPATIENT)
Dept: ADMINISTRATIVE | Age: 35
End: 2020-10-07

## 2020-10-07 NOTE — TELEPHONE ENCOUNTER
Submitted PA for Emgality 120MG/ML auto-injectors (migraine), Key: [id number]. Medication has been APPROVED through 10/08/2021. Approval letter attached    Please notify patient. Thank you.

## 2020-10-19 ENCOUNTER — OFFICE VISIT (OUTPATIENT)
Dept: FAMILY MEDICINE CLINIC | Age: 35
End: 2020-10-19
Payer: COMMERCIAL

## 2020-10-19 VITALS
BODY MASS INDEX: 30.41 KG/M2 | DIASTOLIC BLOOD PRESSURE: 55 MMHG | WEIGHT: 200 LBS | TEMPERATURE: 98.8 F | HEART RATE: 78 BPM | SYSTOLIC BLOOD PRESSURE: 93 MMHG | OXYGEN SATURATION: 96 %

## 2020-10-19 PROCEDURE — 99215 OFFICE O/P EST HI 40 MIN: CPT | Performed by: NURSE PRACTITIONER

## 2020-10-19 RX ORDER — FLUOXETINE HYDROCHLORIDE 20 MG/1
20 CAPSULE ORAL DAILY
Qty: 30 CAPSULE | Refills: 2 | Status: SHIPPED
Start: 2020-10-19 | End: 2021-04-02

## 2020-10-19 RX ORDER — GALCANEZUMAB 120 MG/ML
120 INJECTION, SOLUTION SUBCUTANEOUS
Qty: 1 PEN | Refills: 6 | Status: SHIPPED | OUTPATIENT
Start: 2020-10-19 | End: 2021-07-08

## 2020-10-19 RX ORDER — ELETRIPTAN HYDROBROMIDE 40 MG/1
TABLET, FILM COATED ORAL
Qty: 9 TABLET | Refills: 5 | Status: SHIPPED | OUTPATIENT
Start: 2020-10-19

## 2020-10-19 ASSESSMENT — ENCOUNTER SYMPTOMS
DIARRHEA: 0
NAUSEA: 0
WHEEZING: 0
SHORTNESS OF BREATH: 0
COUGH: 0
SORE THROAT: 0
VOMITING: 0
RHINORRHEA: 0
ABDOMINAL PAIN: 0

## 2020-10-19 NOTE — PROGRESS NOTES
CHIEF COMPLAINT  Chief Complaint   Patient presents with   Luisana FRANCO   Miguelina Ace is a 38 y.o. female who presents to the office for checkup. Patient reports doing well. Patient denies any use of tobacco products, recreational drugs or alcohol. Patient seen neurologist in August for continued treatment of migraines. Patient reports that current treatment does seem to help and control them. Patient denies any new unusual fatigue or unexplained weight loss. No episodes of chest pain, tightness, palpitations or shortness of breath. No abdominal pain or discomfort. No nausea, vomiting, or diarrhea. No changes in bowel or bladder habits. Patient also has history of anxiety/depression and reports that symptoms are worse from time to time. Patient was placed on fluoxetine approximately 1 year ago. Patient reports that symptoms seemed to be controlled at the time however medication is not as effective anymore. Patient reports that she is under a lot of stress with work, grad school, young children and mother being ill. No other complaints, modifying factors or associated symptoms. Nursing notes reviewed. Past Medical History:   Diagnosis Date    Ectopic pregnancy 2013    Endometriosis     IBS (irritable bowel syndrome)     Infertility, female     Has seen Dr. Nu Rai in past multiple IUI's natural conception this pregnancy.     Interstitial cystitis     Kidney stones     x2    Migraines     PCOS (polycystic ovarian syndrome)     Prolonged emergence from general anesthesia      Past Surgical History:   Procedure Laterality Date    BLADDER SURGERY      CYSTOSCOPY      lithotripsy for stone extraction    ECTOPIC PREGNANCY SURGERY  12/21/13    ectopic pregnancy excision laparoscopic left linear salpingostomy    HYSTERECTOMY ABDOMINAL Bilateral 3/18/2019    ROBOTIC ASSISTED TOTAL LAPAROSCOPIC HYSTERECTOMY, BILATERAL SALPINGECTION   performed by [facility]olo Shoemaker [facility] at [facility]    OTHER SURGICAL HISTORY  10/05/2017     DxLS, Evacuation of ectopic pregnancy-fimbriated portion of tube    TONSILLECTOMY      WISDOM TOOTH EXTRACTION       Family History   Problem Relation Age of Onset    Cancer Maternal Grandmother     Cancer Paternal Grandmother     High Cholesterol Paternal Grandmother     Parkinsonism Mother     Migraines Father     Arthritis Father     Arthritis Maternal Uncle     Diabetes Maternal Uncle     High Blood Pressure Maternal Uncle     Arthritis Maternal Grandfather     Diabetes Maternal Grandfather     Heart Disease Maternal Grandfather     High Blood Pressure Maternal Grandfather     High Cholesterol Maternal Grandfather     High Cholesterol Paternal Grandfather     High Blood Pressure Paternal Grandfather      Social History     Socioeconomic History    Marital status:      Spouse name: Not on file    Number of children: Not on file    Years of education: Not on file    Highest education level: Not on file   Occupational History    Not on file   Social Needs    Financial resource strain: Not on file    Food insecurity     Worry: Not on file     Inability: Not on file    Transportation needs     Medical: Not on file     Non-medical: Not on file   Tobacco Use    Smoking status: Never Smoker    Smokeless tobacco: Never Used   Substance and Sexual Activity    Alcohol use: No    Drug use: No    Sexual activity: Yes     Partners: Male   Lifestyle    Physical activity     Days per week: Not on file     Minutes per session: Not on file    Stress: Not on file   Relationships    Social connections     Talks on phone: Not on file     Gets together: Not on file     Attends [employer] service: Not on file     Active member of club or organization: Not on file     Attends meetings of clubs or organizations: Not on file     Relationship status: Not on file    Intimate partner violence     Fear of current or ex partner: Not on file     Emotionally did not receive it and only took it 1 week ago. Patient reports that she did have some breakthrough migraines and felt very ill. Patient reports \"I felt like I was having withdrawals from the medication. \"  Patient reports that since being back on it she has felt fine. Patient reports taking Relpax at least 1-2 times a week. Patient has seen neurologist  in August and recommendations for Botox were discussed however patient does not want to do that. Continue with current medication for migraine management as well as neurology follow-up. Patient will follow-up in 6 months, sooner for new or worsening symptoms. - eletriptan (RELPAX) 40 MG tablet; TAKE ONE-HALF TO ONE TABLET BY MOUTH ONCE AS NEEDED FOR MIGRAINE MAY REPEAT IN 2 HOURS IF NEEDED  Dispense: 9 tablet; Refill: 5  - Galcanezumab-gnlm (EMGALITY) 120 MG/ML SOAJ; Inject 120 mg into the skin every 30 days  Dispense: 1 pen; Refill: 6    2. Allergic sinusitis  Controlled. Patient currently asymptomatic. Patient reports that her children both have sore throats however she feels well. Patient denies any rhinorrhea, cough, congestion, sneezing or watery eyes. Follow-up for any new or worsening symptoms. 3. Endogenous depression (HCC)/Anxiety  Stable versus uncontrolled. Patient reports being placed on fluoxetine approximately 1 year ago for treatment of anxiety. Patient reports that symptoms were managed at that time however recently she feels like she has more anxious. Patient does report having 2 small children at home as well as going to grad school. Patient reports that her mother is ill as well and her fiancé has been traveling for work more often. Patient reports that work is going well but still feels anxious at times. Patient reports having breakthrough panic attack recently. Patient reports that she is unsure if it was related to panic/anxiety or because of not having her migraine medication.   Patient denies any feelings of

## 2020-10-19 NOTE — PATIENT INSTRUCTIONS
Please read the healthy family handout that you were given and share it with your family. Please compare this printed medication list with your medications at home to be sure they are the same. If you have any medications that are different please contact us immediately at [phone]. Also review your allergies that we have listed, these may also include medications that you have not been able to tolerate, make sure everything listed is correct. If you have any allergies that are different please contact us immediately at [phone]. Patient Education        Allergies: Care Instructions  Your Care Instructions     Allergies occur when your body's defense system (immune system) overreacts to certain substances. The immune system treats a harmless substance as if it were a harmful germ or virus. Many things can make this happen. These include pollens, medicine, food, dust, animal dander, and mold. Allergies can be mild or severe. Mild allergies can be managed with home treatment. But medicine may be needed to prevent problems. Managing your allergies is an important part of staying healthy. Your doctor may suggest that you have allergy testing to help find out what is causing your allergies. Severe allergies can cause reactions that affect your whole body (anaphylactic reactions). Your doctor may prescribe a shot of epinephrine to carry with you in case you have a severe reaction. Learn how to give yourself the shot and keep it with you at all times. Make sure it is not . Follow-up care is a key part of your treatment and safety. Be sure to make and go to all appointments, and call your doctor if you are having problems. It's also a good idea to know your test results and keep a list of the medicines you take. How can you care for yourself at home? · If you have been told by your doctor that dust or dust mites are causing your allergy, decrease the dust around your bed:  ?  Wash sheets, pillowcases, and other bedding in hot water every week. ? Use dust-proof covers for pillows, duvets, and mattresses. Avoid plastic covers because they tear easily and do not \"breathe. \" Wash as instructed on the label. ? Do not use any blankets and pillows that you do not need. ? Use blankets that you can wash in your washing machine. ? Consider removing drapes and carpets, which attract and hold dust, from your bedroom. · If you are allergic to house dust and mites, do not use home humidifiers. Your doctor can suggest ways you can control dust and mites. · Look for signs of cockroaches. Cockroaches cause allergic reactions. Use cockroach baits to get rid of them. Then, clean your home well. Cockroaches like areas where grocery bags, newspapers, empty bottles, or cardboard boxes are stored. Do not keep these inside your home, and keep trash and food containers sealed. Seal off any spots where cockroaches might enter your home. · If you are allergic to mold, get rid of furniture, rugs, and drapes that smell musty. Check for mold in the bathroom. · If you are allergic to outdoor pollen or mold spores, use air-conditioning. Change or clean all filters every month. Keep windows closed. · If you are allergic to pollen, stay inside when pollen counts are high. Use a vacuum  with a HEPA filter or a double-thickness filter at least two times each week. · Stay inside when air pollution is bad. Avoid paint fumes, perfumes, and other strong odors. · Avoid conditions that make your allergies worse. Stay away from smoke. Do not smoke or let anyone else smoke in your house. Do not use fireplaces or wood-burning stoves. · If you are allergic to your pets, change the air filter in your furnace every month. Use high-efficiency filters. · If you are allergic to pet dander, keep pets outside or out of your bedroom. Old carpet and cloth furniture can hold a lot of animal dander. You may need to replace them.   When should you call for help? Give an epinephrine shot if:    · You think you are having a severe allergic reaction.     · You have symptoms in more than one body area, such as mild nausea and an itchy mouth. After giving an epinephrine shot call 911, even if you feel better. Call 911 if:    · You have symptoms of a severe allergic reaction. These may include:  ? Sudden raised, red areas (hives) all over your body. ? Swelling of the throat, mouth, lips, or tongue. ? Trouble breathing. ? Passing out (losing consciousness). Or you may feel very lightheaded or suddenly feel weak, confused, or restless.     · You have been given an epinephrine shot, even if you feel better. Call your doctor now or seek immediate medical care if:    · You have symptoms of an allergic reaction, such as:  ? A rash or hives (raised, red areas on the skin). ? Itching. ? Swelling. ? Belly pain, nausea, or vomiting. Watch closely for changes in your health, and be sure to contact your doctor if:    · You do not get better as expected. Where can you learn more? Go to https://HoverWind.TouchSpin Gaming AG. org and sign in to your Invisible Connect account. Enter Q774 in the ReliantHeart box to learn more about \"Allergies: Care Instructions. \"     If you do not have an account, please click on the \"Sign Up Now\" link. Current as of: June 29, 2020               Content Version: 12.6  © 2896-8424 [employer]. Care instructions adapted under license by [facility]. If you have questions about a medical condition or this instruction, always ask your healthcare professional. [place] [address] any warranty or liability for your use of this information. Patient Education        Anxiety Disorder: Care Instructions  Your Care Instructions     Anxiety is a normal reaction to stress. Difficult situations can cause you to have symptoms such as sweaty palms and a nervous feeling.   In an anxiety disorder, the symptoms are far more severe. Constant worry, muscle tension, trouble sleeping, nausea and diarrhea, and other symptoms can make normal daily activities difficult or impossible. These symptoms may occur for no reason, and they can affect your work, school, or social life. Medicines, counseling, and self-care can all help. Follow-up care is a key part of your treatment and safety. Be sure to make and go to all appointments, and call your doctor if you are having problems. It's also a good idea to know your test results and keep a list of the medicines you take. How can you care for yourself at home? · Take medicines exactly as directed. Call your doctor if you think you are having a problem with your medicine. · Go to your counseling sessions and follow-up appointments. · Recognize and accept your anxiety. Then, when you are in a situation that makes you anxious, say to yourself, \"This is not an emergency. I feel uncomfortable, but I am not in danger. I can keep going even if I feel anxious. \"  · Be kind to your body:  ? Relieve tension with exercise or a massage. ? Get enough rest.  ? Avoid alcohol, caffeine, nicotine, and illegal drugs. They can increase your anxiety level and cause sleep problems. ? Learn and do relaxation techniques. See below for more about these techniques. · Engage your mind. Get out and do something you enjoy. Go to a funny movie, or take a walk or hike. Plan your day. Having too much or too little to do can make you anxious. · Keep a record of your symptoms. Discuss your fears with a good friend or family member, or join a support group for people with similar problems. Talking to others sometimes relieves stress. · Get involved in social groups, or volunteer to help others. Being alone sometimes makes things seem worse than they are. · Get at least 30 minutes of exercise on most days of the week to relieve stress. Walking is a good choice.  You also may want to do other activities, such as running, swimming, cycling, or playing tennis or team sports. Relaxation techniques  Do relaxation exercises 10 to 20 minutes a day. You can play soothing, relaxing music while you do them, if you wish. · Tell others in your house that you are going to do your relaxation exercises. Ask them not to disturb you. · Find a comfortable place, away from all distractions and noise. · Lie down on your back, or sit with your back straight. · Focus on your breathing. Make it slow and steady. · Breathe in through your nose. Breathe out through either your nose or mouth. · Breathe deeply, filling up the area between your navel and your rib cage. Breathe so that your belly goes up and down. · Do not hold your breath. · Breathe like this for 5 to 10 minutes. Notice the feeling of calmness throughout your whole body. As you continue to breathe slowly and deeply, relax by doing the following for another 5 to 10 minutes:  · Tighten and relax each muscle group in your body. You can begin at your toes and work your way up to your head. · Imagine your muscle groups relaxing and becoming heavy. · Empty your mind of all thoughts. · Let yourself relax more and more deeply. · Become aware of the state of calmness that surrounds you. · When your relaxation time is over, you can bring yourself back to alertness by moving your fingers and toes and then your hands and feet and then stretching and moving your entire body. Sometimes people fall asleep during relaxation, but they usually wake up shortly afterward. · Always give yourself time to return to full alertness before you drive a car or do anything that might cause an accident if you are not fully alert. Never play a relaxation tape while you drive a car. When should you call for help? Call 911 anytime you think you may need emergency care. For example, call if:    · You feel you cannot stop from hurting yourself or someone else.    Keep the numbers for these national suicide hotlines: 8-372-638-TALK (5-422.487.8363) and 3-157-NQEFAQB (1-766.589.4107). If you or someone you know talks about suicide or feeling hopeless, get help right away. Watch closely for changes in your health, and be sure to contact your doctor if:    · You have anxiety or fear that affects your life.     · You have symptoms of anxiety that are new or different from those you had before. Where can you learn more? Go to https://CompBluepeThe Veteran AssetewLutonix.Camgian Microsystems. org and sign in to your LogicStream Health account. Enter P754 in the The Health Wagon box to learn more about \"Anxiety Disorder: Care Instructions. \"     If you do not have an account, please click on the \"Sign Up Now\" link. Current as of: January 31, 2020               Content Version: 12.6  © 1206-1350 [employer]i Corporation. Care instructions adapted under license by [facility] ([facility]). If you have questions about a medical condition or this instruction, always ask your healthcare professional. [address] any warranty or liability for your use of this information. Patient Education        Recovering From Depression: Care Instructions  Your Care Instructions     Taking good care of yourself is important as you recover from depression. In time, your symptoms will fade as your treatment takes hold. Do not give up. Instead, focus your energy on getting better. Your mood will improve. It just takes some time. Focus on things that can help you feel better, such as being with friends and family, eating well, and getting enough rest. But take things slowly. Do not do too much too soon. You will begin to feel better gradually. Follow-up care is a key part of your treatment and safety. Be sure to make and go to all appointments, and call your doctor if you are having problems. It's also a good idea to know your test results and keep a list of the medicines you take. How can you care for yourself at home?   Be realistic  · If you have a large task to do, break it up into smaller steps you can handle, and just do what you can. · You may want to put off important decisions until your depression has lifted. If you have plans that will have a major impact on your life, such as marriage, divorce, or a job change, try to wait a bit. Talk it over with friends and loved ones who can help you look at the overall picture first.  · Reaching out to people for help is important. Do not isolate yourself. Let your family and friends help you. Find someone you can trust and confide in, and talk to that person. · Be patient, and be kind to yourself. Remember that depression is not your fault and is not something you can overcome with willpower alone. Treatment is important for depression, just like for any other illness. Feeling better takes time, and your mood will improve little by little. Stay active  · Stay busy and get outside. Take a walk, or try some other light exercise. · Talk with your doctor about an exercise program. Exercise can help with mild depression. · Go to a movie or concert. Take part in a Gnosticism activity or other social gathering. Go to a ball game. · Ask a friend to have dinner with you. Take care of yourself  · Eat a balanced diet with plenty of fresh fruits and vegetables, whole grains, and lean protein. If you have lost your appetite, eat small snacks rather than large meals. · Avoid using illegal drugs or marijuana and drinking alcohol. Do not take medicines that have not been prescribed for you. They may interfere with medicines you may be taking for depression, or they may make your depression worse. · Take your medicines exactly as they are prescribed. You may start to feel better within 1 to 3 weeks of taking antidepressant medicine. But it can take as many as 6 to 8 weeks to see more improvement.  If you have questions or concerns about your medicines, or if you do not notice any improvement by 3 weeks, talk to your doctor. · Continue to take your medicine after your symptoms improve. Taking your medicine for at least 6 months after you feel better can help keep you from getting depressed again. If this isn't the first time you have been depressed, your doctor may recommend you to take medicine even longer. · If you have any side effects from your medicine, tell your doctor. Many side effects are mild and will go away on their own after you have been taking the medicine for a few weeks. Some may last longer. Talk to your doctor if side effects are bothering you too much. You might be able to try a different medicine. · Continue counseling. It may help prevent depression from returning, especially if you've had multiple episodes of depression. Talk with your counselor if you are having a hard time attending your sessions or you think the sessions aren't working. Don't just stop going. · Get enough sleep. Talk to your doctor if you are having problems sleeping. · Avoid sleeping pills unless they are prescribed by the doctor treating your depression. Sleeping pills may make you groggy during the day, and they may interact with other medicine you are taking. · If you have any other illnesses, such as diabetes, heart disease, or high blood pressure, make sure to continue with your treatment. Tell your doctor about all of the medicines you take, including those with or without a prescription. · If you or someone you know talks about suicide, self-harm, or feeling hopeless, get help right away. Call the [address] [place] [address] at 4-544-838-ZAXN (4-414.889.3745) or text HOME to 154210 to access the Crisis Text Line. Consider saving these numbers in your phone. When should you call for help? Call 231 anytime you think you may need emergency care.  For example, call if:    · You feel like hurting yourself or someone else.     · Someone you know has depression and is about to attempt or is attempting suicide. Call your doctor now or seek immediate medical care if:    · You hear voices.     · Someone you know has depression and:  ? Starts to give away his or her possessions. ? Uses illegal drugs or drinks alcohol heavily. ? Talks or writes about death, including writing suicide notes or talking about guns, knives, or pills. ? Starts to spend a lot of time alone. ? Acts very aggressively or suddenly appears calm. Watch closely for changes in your health, and be sure to contact your doctor if:    · You do not get better as expected. Where can you learn more? Go to https://chpepiceweb."RapidValue Solutions, Inc". org and sign in to your FansUnite account. Enter T069 in the [facility] box to learn more about \"Recovering From Depression: Care Instructions. \"     If you do not have an account, please click on the \"Sign Up Now\" link. Current as of: January 31, 2020               Content Version: 12.6  © 7802-7099 @Pay. Care instructions adapted under license by [facility] ([facility]). If you have questions about a medical condition or this instruction, always ask your healthcare professional. [place] [address] any warranty or liability for your use of this information. Patient Education        Migraine Headache: Care Instructions  Your Care Instructions     Migraines are painful, throbbing headaches that often start on one side of the head. They may cause nausea and vomiting and make you sensitive to light, sound, or smell. Without treatment, migraines can last from 4 hours to a few days. Medicines can help prevent migraines or stop them after they have started. Your doctor can help you find which ones work best for you. Follow-up care is a key part of your treatment and safety. Be sure to make and go to all appointments, and call your doctor if you are having problems.  It's also a good idea to know your test results and keep a list of the medicines you take.  How can you care for yourself at home? · Do not drive if you have taken a prescription pain medicine. · Rest in a quiet, dark room until your headache is gone. Close your eyes, and try to relax or go to sleep. Don't watch TV or read. · Put a cold, moist cloth or cold pack on the painful area for 10 to 20 minutes at a time. Put a thin cloth between the cold pack and your skin. · Use a warm, moist towel or a heating pad set on low to relax tight shoulder and neck muscles. · Have someone gently massage your neck and shoulders. · Take your medicines exactly as prescribed. Call your doctor if you think you are having a problem with your medicine. You will get more details on the specific medicines your doctor prescribes. · Don't take medicine for headache pain too often. Talk to your doctor if you are taking medicine more than 2 days a week to stop a headache. Taking too much pain medicine can lead to more headaches. These are called medicine-overuse headaches. To prevent migraines  · Keep a headache diary so you can figure out what triggers your headaches. Avoiding triggers may help you prevent headaches. Record when each headache began, how long it lasted, and what the pain was like. Write down any other symptoms you had with the headache, such as nausea, flashing lights or dark spots, or sensitivity to bright light or loud noise. Note if the headache occurred near your period. List anything that might have triggered the headache. Triggers may include certain foods (chocolate, cheese, wine) or odors, smoke, bright light, stress, or lack of sleep. · If your doctor has prescribed medicine for your migraines, take it as directed. You may have medicine that you take only when you get a migraine and medicine that you take all the time to help prevent migraines.   ? If your doctor has prescribed medicine for when you get a headache, take it at the first sign of a migraine, unless your doctor has given you other instructions. ? If your doctor has prescribed medicine to prevent migraines, take it exactly as prescribed. Call your doctor if you think you are having a problem with your medicine. · Find healthy ways to deal with stress. Migraines are most common during or right after stressful times. Try finding ways to reduce stress like practicing mindfulness or deep breathing exercises. · Get plenty of sleep and exercise. But be careful to not push yourself too hard during exercise. It may trigger a headache. · Eat meals on a regular schedule. Avoid foods and drinks that often trigger migraines. These include chocolate, alcohol (especially red wine and port), aspartame, monosodium glutamate (MSG), and some additives found in foods (such as hot dogs, weeks, cold cuts, aged cheeses, and pickled foods). · Limit caffeine. Don't drink too much coffee, tea, or soda. But don't quit caffeine suddenly. That can also give you migraines. · Do not smoke or allow others to smoke around you. If you need help quitting, talk to your doctor about stop-smoking programs and medicines. These can increase your chances of quitting for good. · If you are taking birth control pills or hormone therapy, talk to your doctor about whether they are triggering your migraines. When should you call for help? Call 911 anytime you think you may need emergency care. For example, call if:    · You have signs of a stroke. These may include:  ? Sudden numbness, paralysis, or weakness in your face, arm, or leg, especially on only one side of your body. ? Sudden vision changes. ? Sudden trouble speaking. ? Sudden confusion or trouble understanding simple statements. ? Sudden problems with walking or balance. ? A sudden, severe headache that is different from past headaches.    Call your doctor now or seek immediate medical care if:    · You have new or worse nausea and vomiting.     · You have a new or higher fever.     · Your headache gets much worse. Watch closely for changes in your health, and be sure to contact your doctor if:    · You are not getting better after 2 days (48 hours). Where can you learn more? Go to https://eMoovpeLifetime Oy Lifetime Studios.Pipit Interactive. org and sign in to your Cuciniale account. Enter E677 in the Property Owl box to learn more about \"Migraine Headache: Care Instructions. \"     If you do not have an account, please click on the \"Sign Up Now\" link. Current as of: November 20, 2019               Content Version: 12.6  © 5858-2576 InSite [employer], [employer]. Care instructions adapted under license by [facility] ([facility]). If you have questions about a medical condition or this instruction, always ask your healthcare professional. [address] any warranty or liability for your use of this information.

## 2020-11-04 ENCOUNTER — HOSPITAL ENCOUNTER (EMERGENCY)
Age: 35
Discharge: HOME | End: 2020-11-04
Attending: EMERGENCY MEDICINE
Payer: COMMERCIAL

## 2020-11-04 VITALS
HEIGHT: 68 IN | WEIGHT: 185 LBS | OXYGEN SATURATION: 99 % | TEMPERATURE: 98.5 F | SYSTOLIC BLOOD PRESSURE: 133 MMHG | RESPIRATION RATE: 18 BRPM | DIASTOLIC BLOOD PRESSURE: 78 MMHG | HEART RATE: 63 BPM | BODY MASS INDEX: 28.04 KG/M2

## 2020-11-04 PROCEDURE — 2500000003 HC RX 250 WO HCPCS: Performed by: EMERGENCY MEDICINE

## 2020-11-04 PROCEDURE — 64400 NJX AA&/STRD TRIGEMINAL NRV: CPT

## 2020-11-04 PROCEDURE — 6370000000 HC RX 637 (ALT 250 FOR IP): Performed by: EMERGENCY MEDICINE

## 2020-11-04 PROCEDURE — 99284 EMERGENCY DEPT VISIT MOD MDM: CPT

## 2020-11-04 RX ORDER — BUPIVACAINE HYDROCHLORIDE 2.5 MG/ML
30 INJECTION, SOLUTION EPIDURAL; INFILTRATION; INTRACAUDAL ONCE
Status: COMPLETED | OUTPATIENT
Start: 2020-11-04 | End: 2020-11-04

## 2020-11-04 RX ADMIN — BENZOCAINE: 200 SPRAY DENTAL; ORAL; PERIODONTAL at 04:45

## 2020-11-04 RX ADMIN — BUPIVACAINE HYDROCHLORIDE 75 MG: 2.5 INJECTION, SOLUTION EPIDURAL; INFILTRATION; INTRACAUDAL; PERINEURAL at 04:44

## 2020-11-04 ASSESSMENT — PAIN DESCRIPTION - FREQUENCY: FREQUENCY: CONTINUOUS

## 2020-11-04 ASSESSMENT — PAIN DESCRIPTION - LOCATION: LOCATION: TEETH

## 2020-11-04 ASSESSMENT — PAIN DESCRIPTION - PAIN TYPE: TYPE: ACUTE PAIN

## 2020-11-04 ASSESSMENT — PAIN SCALES - GENERAL: PAINLEVEL_OUTOF10: 9

## 2020-11-04 NOTE — ED PROVIDER NOTES
[address] [facility]  ED  eMERGENCY dEPARTMENTeNCOUnter      Pt Name: Kristopher Hayden  MRN: [id number]  [place] 1985  Date of evaluation: 11/4/2020  Provider: Clyde Rabago MD    CHIEF COMPLAINT       Chief Complaint   Patient presents with    Dental Pain     Upper left root canal done about a year ago; seen dentist for pain in that tooth; referred to specialist today; taking Vicodin and ibuprofen; not helping; pain increasing. HISTORY OF PRESENT ILLNESS   (Location/Symptom, Timing/Onset,Context/Setting, Quality, Duration, Modifying Factors, Severity)  Note limiting factors. Kristopher Hayden is a 38 y.o. female who presents to the emergency department for dental pain. The patient had a root canal in the left upper jaw about 1 year ago. She saw dentist for dental pain that has been worsening over the past few days she saw that person yesterday. She was sent home with Vicodin and ibuprofen. She has an appointment for in the endootologist today. She was unable to tolerate the pain this evening so she came into the emergency room. She has not had a fever. Nursing notes were reviewed. REVIEW OF SYSTEMS    (2-9 systems for level 4, 10 or more for level 5)     Review of Systems    Positive and pertinent negative as per HPI. Except as noted above in the ROS, all other systems were reviewed and were negative. PAST MEDICAL HISTORY     Past Medical History:   Diagnosis Date    Ectopic pregnancy 2013    Endometriosis     IBS (irritable bowel syndrome)     Infertility, female     Has seen Dr. Natalia Rizvi in past multiple IUI's natural conception this pregnancy.     Interstitial cystitis     Kidney stones     x2    Migraines     PCOS (polycystic ovarian syndrome)     Prolonged emergence from general anesthesia          SURGICALHISTORY       Past Surgical History:   Procedure Laterality Date    BLADDER SURGERY      CYSTOSCOPY      lithotripsy for stone extraction    ECTOPIC education: None    Highest education level: None   Occupational History    None   Social Needs    Financial resource strain: None    Food insecurity     Worry: None     Inability: None    Transportation needs     Medical: None     Non-medical: None   Tobacco Use    Smoking status: Never Smoker    Smokeless tobacco: Never Used   Substance and Sexual Activity    Alcohol use: No    Drug use: No    Sexual activity: Yes     Partners: Male   Lifestyle    Physical activity     Days per week: None     Minutes per session: None    Stress: None   Relationships    Social connections     Talks on phone: None     Gets together: None     Attends Christian service: None     Active member of club or organization: None     Attends meetings of clubs or organizations: None     Relationship status: None    Intimate partner violence     Fear of current or ex partner: None     Emotionally abused: None     Physically abused: None     Forced sexual activity: None   Other Topics Concern    None   Social History Narrative    None       SCREENINGS    [place] Coma Scale  Eye Opening: Spontaneous  Best Verbal Response: Oriented  Best Motor Response: Obeys commands  [place] Coma Scale Score: 15        PHYSICAL EXAM    (up to 7 for level 4, 8 or more for level 5)     ED Triage Vitals [11/04/20 0332]   BP Temp Temp Source Pulse Resp SpO2 Height Weight   (!) 192/110 98.5 °F (36.9 °C) Oral 100 18 99 % 5' 8\" (1.727 m) 185 lb (83.9 kg)       Physical Exam  Vitals signs and nursing note reviewed. Constitutional:       Appearance: Normal appearance. She is well-developed. She is not ill-appearing. HENT:      Head: Normocephalic and atraumatic. Right Ear: External ear normal.      Left Ear: External ear normal.      Nose: Nose normal.      Mouth/Throat:     Eyes:      General: No scleral icterus. Right eye: No discharge. Left eye: No discharge.       Conjunctiva/sclera: Conjunctivae normal.   Neck: additional pain between the 2 teeth. No complications of this block. Viscous lidocaine and Hurricaine sprayed onto cotton and patient advised to please this in the region of maximum tenderness. I do offer to do an additional block however the patient does not wish to have this done at this time. She has taken Vicodin with no relief. She gets better relief acetaminophen and ibuprofen I encouraged her to alternate the medications for better pain control. I also encouraged her to follow-up with her specialist today. The patient expresses understanding and is agreeable with the treatment plan. CRITICAL CARE TIME   None     CONSULTS:  None    PROCEDURES:  Unless otherwise noted above, none     Procedures    FINAL IMPRESSION      1.  Pain, dental          DISPOSITION/PLAN   DISPOSITION Decision To Discharge 11/04/2020 05:16:16 AM      PATIENT REFERREDTO:  PERLA Casanova - CNP  [facility]    Schedule an appointment as soon as possible for a visit         DISCHARGEMEDICATIONS:  Discharge Medication List as of 11/4/2020  5:16 AM             (Please note that portions of this note were completed with a voice recognition program.  Efforts were made to edit the dictations but occasionally words are mis-transcribed.)    Phani Puckett MD (electronically signed)  Attending Emergency Physician       Phani Puckett MD  11/04/20 2056

## 2021-03-29 ENCOUNTER — TELEPHONE (OUTPATIENT)
Dept: FAMILY MEDICINE CLINIC | Age: 36
End: 2021-03-29

## 2021-03-29 NOTE — TELEPHONE ENCOUNTER
----- Message from Katy Parikh sent at 3/29/2021  3:48 PM EDT -----  Subject: Message to Provider    QUESTIONS  Information for Provider? patients gyn wants patient to get her thryroid   checked she has gained weight since her hysterectoy in 2019. please call   her back   ---------------------------------------------------------------------------  --------------  CALL BACK INFO  What is the best way for the office to contact you? OK to leave message on   voicemail  Preferred Call Back Phone Number? [phone]  ---------------------------------------------------------------------------  --------------  SCRIPT ANSWERS  Relationship to Patient?  Self

## 2021-04-02 ENCOUNTER — OFFICE VISIT (OUTPATIENT)
Dept: FAMILY MEDICINE CLINIC | Age: 36
End: 2021-04-02
Payer: COMMERCIAL

## 2021-04-02 VITALS
BODY MASS INDEX: 30.73 KG/M2 | HEART RATE: 76 BPM | OXYGEN SATURATION: 99 % | TEMPERATURE: 98.1 F | WEIGHT: 202.13 LBS | SYSTOLIC BLOOD PRESSURE: 96 MMHG | DIASTOLIC BLOOD PRESSURE: 65 MMHG

## 2021-04-02 DIAGNOSIS — Z13.1 DIABETES MELLITUS SCREENING: ICD-10-CM

## 2021-04-02 DIAGNOSIS — N30.10 INTERSTITIAL CYSTITIS: ICD-10-CM

## 2021-04-02 DIAGNOSIS — F41.9 ANXIETY: ICD-10-CM

## 2021-04-02 DIAGNOSIS — F33.2 ENDOGENOUS DEPRESSION (HCC): ICD-10-CM

## 2021-04-02 DIAGNOSIS — R35.0 FREQUENT URINATION: Primary | ICD-10-CM

## 2021-04-02 DIAGNOSIS — R63.5 WEIGHT GAIN: ICD-10-CM

## 2021-04-02 DIAGNOSIS — G43.109 MIGRAINE WITH AURA AND WITHOUT STATUS MIGRAINOSUS, NOT INTRACTABLE: ICD-10-CM

## 2021-04-02 LAB
BASOPHILS ABSOLUTE: 0 K/UL (ref 0–0.2)
BASOPHILS RELATIVE PERCENT: 0.3 %
BILIRUBIN, POC: NORMAL
BLOOD URINE, POC: NORMAL
CLARITY, POC: CLEAR
COLOR, POC: YELLOW
EOSINOPHILS ABSOLUTE: 0.2 K/UL (ref 0–0.6)
EOSINOPHILS RELATIVE PERCENT: 2.5 %
GLUCOSE URINE, POC: NORMAL
HCT VFR BLD CALC: 42.4 % (ref 36–48)
HEMOGLOBIN: 14.4 G/DL (ref 12–16)
KETONES, POC: NORMAL
LEUKOCYTE EST, POC: NORMAL
LYMPHOCYTES ABSOLUTE: 2.1 K/UL (ref 1–5.1)
LYMPHOCYTES RELATIVE PERCENT: 27.2 %
MCH RBC QN AUTO: 29 PG (ref 26–34)
MCHC RBC AUTO-ENTMCNC: 34 G/DL (ref 31–36)
MCV RBC AUTO: 85.1 FL (ref 80–100)
MONOCYTES ABSOLUTE: 0.7 K/UL (ref 0–1.3)
MONOCYTES RELATIVE PERCENT: 9 %
NEUTROPHILS ABSOLUTE: 4.8 K/UL (ref 1.7–7.7)
NEUTROPHILS RELATIVE PERCENT: 61 %
NITRITE, POC: NORMAL
PDW BLD-RTO: 12.8 % (ref 12.4–15.4)
PH, POC: 7
PLATELET # BLD: 340 K/UL (ref 135–450)
PMV BLD AUTO: 7.9 FL (ref 5–10.5)
PROTEIN, POC: NORMAL
RBC # BLD: 4.98 M/UL (ref 4–5.2)
SPECIFIC GRAVITY, POC: 1.02
UROBILINOGEN, POC: 0.2
WBC # BLD: 7.8 K/UL (ref 4–11)

## 2021-04-02 PROCEDURE — 81003 URINALYSIS AUTO W/O SCOPE: CPT | Performed by: NURSE PRACTITIONER

## 2021-04-02 PROCEDURE — 99214 OFFICE O/P EST MOD 30 MIN: CPT | Performed by: NURSE PRACTITIONER

## 2021-04-02 RX ORDER — SUMATRIPTAN 25 MG/1
25 TABLET, FILM COATED ORAL
Qty: 27 TABLET | Refills: 1 | Status: CANCELLED | OUTPATIENT
Start: 2021-04-02 | End: 2021-04-02

## 2021-04-02 ASSESSMENT — ENCOUNTER SYMPTOMS
NAUSEA: 0
SORE THROAT: 0
VOMITING: 0
COUGH: 0
WHEEZING: 0
SHORTNESS OF BREATH: 0
DIARRHEA: 0
RHINORRHEA: 0
ABDOMINAL PAIN: 0

## 2021-04-02 NOTE — PROGRESS NOTES
CHIEF COMPLAINT  Chief Complaint   Patient presents with    Other     Thyroid discuss per gyno    Urinary Frequency    Other     Discuss migraine medication        HPI   Constanza Moreno is a 38 y.o. female who presents to the office complaining of possible UTI, migraine management, weight gain. Patient reports that she has noticed gradually gaining weight despite trying to watch what she eats and intermittent fasting. Patient reports that she did attempt keto but developed kidney stones and had to come off of that diet. Patient reports that gynecologist wanted her discussed thyroid with PCP. Patient reports that last night she did have burning and frequency and was concerned with UTI. Patient has history of interstitial cystitis. Patient has ongoing history of migraines and reports taking medications as prescribed. Patient reports breakthrough migraines. No abdominal pain or discomfort. No nausea, vomiting, or diarrhea. No changes in bowel or bladder. Patient [place] any recent fever, cough or congestion. No other complaints, modifying factors or associated symptoms. Nursing notes reviewed. Past Medical History:   Diagnosis Date    Ectopic pregnancy 2013    Endometriosis     IBS (irritable bowel syndrome)     Infertility, female     Has seen Dr. Loren Alejandra in past multiple IUI's natural conception this pregnancy.     Interstitial cystitis     Kidney stones     x2    Migraines     PCOS (polycystic ovarian syndrome)     Prolonged emergence from general anesthesia      Past Surgical History:   Procedure Laterality Date    BLADDER SURGERY      CYSTOSCOPY      lithotripsy for stone extraction    ECTOPIC PREGNANCY SURGERY  12/21/13    ectopic pregnancy excision laparoscopic left linear salpingostomy    HYSTERECTOMY ABDOMINAL Bilateral 3/18/2019    ROBOTIC ASSISTED TOTAL LAPAROSCOPIC HYSTERECTOMY, BILATERAL SALPINGECTION   performed by Lam Carnes MD at [address] HISTORY  10/05/2017     DxLS, Evacuation of ectopic pregnancy-fimbriated portion of tube    TONSILLECTOMY      WISDOM TOOTH EXTRACTION       Family History   Problem Relation Age of Onset    Cancer Maternal Grandmother     Cancer Paternal Grandmother     High Cholesterol Paternal Grandmother     Parkinsonism Mother     Migraines Father     Arthritis Father     Arthritis Maternal Uncle     Diabetes Maternal Uncle     High Blood Pressure Maternal Uncle     Arthritis Maternal Grandfather     Diabetes Maternal Grandfather     Heart Disease Maternal Grandfather     High Blood Pressure Maternal Grandfather     High Cholesterol Maternal Grandfather     High Cholesterol Paternal Grandfather     High Blood Pressure Paternal Grandfather      Social History     Socioeconomic History    Marital status:      Spouse name: Not on file    Number of children: Not on file    Years of education: Not on file    Highest education level: Not on file   Occupational History    Not on file   Social Needs    Financial resource strain: Not on file    Food insecurity     Worry: Not on file     Inability: Not on file    Transportation needs     Medical: Not on file     Non-medical: Not on file   Tobacco Use    Smoking status: Never Smoker    Smokeless tobacco: Never Used   Substance and Sexual Activity    Alcohol use: No    Drug use: No    Sexual activity: Yes     Partners: Male   Lifestyle    Physical activity     Days per week: Not on file     Minutes per session: Not on file    Stress: Not on file   Relationships    Social connections     Talks on phone: Not on file     Gets together: Not on file     Attends Bahai service: Not on file     Active member of club or organization: Not on file     Attends meetings of clubs or organizations: Not on file     Relationship status: Not on file    Intimate partner violence     Fear of current or ex partner: Not on file     Emotionally abused: Not on file Physically abused: Not on file     Forced sexual activity: Not on file   Other Topics Concern    Not on file   Social History Narrative    Not on file     Current Outpatient Medications   Medication Sig Dispense Refill    eletriptan (RELPAX) 40 MG tablet TAKE ONE-HALF TO ONE TABLET BY MOUTH ONCE AS NEEDED FOR MIGRAINE MAY REPEAT IN 2 HOURS IF NEEDED 9 tablet 5    Galcanezumab-gnlm (EMGALITY) 120 MG/ML SOAJ Inject 120 mg into the skin every 30 days 1 pen 6    spironolactone (ALDACTONE) 25 MG tablet Take 25 mg by mouth daily       No current facility-administered medications for this visit. Allergies   Allergen Reactions    Topamax [Topiramate] Other (See Comments)     Kidney stones    Cymbalta [Duloxetine Hcl] Other (See Comments)     moodiness       REVIEW OF SYSTEMS  Review of Systems   Constitutional: Positive for unexpected weight change. Negative for activity change, appetite change, chills and fever. HENT: Negative for congestion, rhinorrhea and sore throat. Eyes: Negative for visual disturbance. Respiratory: Negative for cough, shortness of breath and wheezing. Cardiovascular: Negative for chest pain and leg swelling. Gastrointestinal: Negative for abdominal pain, diarrhea, nausea and vomiting. Genitourinary: Negative for dysuria, frequency, hematuria and urgency. Musculoskeletal: Negative for gait problem and myalgias. Skin: Negative for rash. Neurological: Positive for headaches. Negative for dizziness, weakness, light-headedness and numbness. Psychiatric/Behavioral: Negative for self-injury, sleep disturbance and suicidal ideas. The patient is nervous/anxious. PHYSICAL EXAM  BP 96/65   Pulse 76   Temp 98.1 °F (36.7 °C) (Oral)   Wt 202 lb 2 oz (91.7 kg)   LMP 03/14/2019   SpO2 99%   BMI 30.73 kg/m²   Physical Exam  Vitals signs reviewed. Constitutional:       General: She is not in acute distress. Appearance: Normal appearance. She is well-developed. medication several months ago. 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 Patient reports that she did not feel that medication was helping and was concerned it was causing weight gain. We did discuss coping mechanisms, counseling and therapy and the importance of management. Patient will consider counseling and will call to establish care. We did discuss restarting fluoxetine however patient would like to wait at this time. Patient contracts for safety. Follow-up for any new or concerning symptoms. 6. Anxiety  See above #5.     7.  Migraine with aura and without status migrainous, not intractable  Stable. Patient has longstanding history with migraines and has been on medications in the past.  Patient reports Emgality once a month and Relpax as needed. Patient reports that she has had intermittent breakthrough headaches and is concerned that Relpax is no longer helping. Patient reports that she had a migraine on Sunday evening and faded later that evening but never completely went away until Tuesday. Patient reports that she took 3 doses of Relpax in 2 days and was concerned. Patient has been to neurology in the past but not recently. Patient reports that neurologist wanted to do Botox injections and she does not feel that that will help. Patient reports that her father received those and had similar headaches as well as a coworkers child and did not receive any results or resolution of migraines with Botox. We did discuss alternative options such as Imitrex and increasing doses of current medications. I did recommend patient taking Relpax 40 mg at onset of headache and not to wait any longer as this can delay onset of action. Patient aware that if headache does not improve or it dissipates she can return dose and 2 hours but not to take more than 80 mg in 24 hours. Patient encouraged to drink plenty fluids to stay hydrated and if symptoms do not improve or worsen to follow-up with neurology. Patient verbalized and acknowledges with plan of care at this time. The note was completed using Dragon voice recognition transcription. Every effort was made to ensure accuracy; however, inadvertent  transcription errors may be present despite my best efforts to edit errors.     Eliel Carr, APRN - CNP

## 2021-04-02 NOTE — PATIENT INSTRUCTIONS
Please read the healthy family handout that you were given and share it with your family. Please compare this printed medication list with your medications at home to be sure they are the same. If you have any medications that are different please contact us immediately at [phone]. Also review your allergies that we have listed, these may also include medications that you have not been able to tolerate, make sure everything listed is correct. If you have any allergies that are different please contact us immediately at [phone].

## 2021-04-03 LAB
A/G RATIO: 1.7 (ref 1.1–2.2)
ALBUMIN SERPL-MCNC: 4.7 G/DL (ref 3.4–5)
ALP BLD-CCNC: 71 U/L (ref 40–129)
ALT SERPL-CCNC: 18 U/L (ref 10–40)
ANION GAP SERPL CALCULATED.3IONS-SCNC: 8 MMOL/L (ref 3–16)
AST SERPL-CCNC: 20 U/L (ref 15–37)
BILIRUB SERPL-MCNC: 0.4 MG/DL (ref 0–1)
BUN BLDV-MCNC: 11 MG/DL (ref 7–20)
CALCIUM SERPL-MCNC: 8.8 MG/DL (ref 8.3–10.6)
CHLORIDE BLD-SCNC: 103 MMOL/L (ref 99–110)
CO2: 29 MMOL/L (ref 21–32)
CREAT SERPL-MCNC: 0.6 MG/DL (ref 0.6–1.1)
ESTIMATED AVERAGE GLUCOSE: 99.7 MG/DL
GFR AFRICAN AMERICAN: >60
GFR NON-AFRICAN AMERICAN: >60
GLOBULIN: 2.7 G/DL
GLUCOSE BLD-MCNC: 78 MG/DL (ref 70–99)
HBA1C MFR BLD: 5.1 %
POTASSIUM SERPL-SCNC: 4.1 MMOL/L (ref 3.5–5.1)
SODIUM BLD-SCNC: 140 MMOL/L (ref 136–145)
T3 FREE: 3.2 PG/ML (ref 2.3–4.2)
T4 TOTAL: 8 UG/DL (ref 4.5–10.9)
TOTAL PROTEIN: 7.4 G/DL (ref 6.4–8.2)
TSH REFLEX: 1.22 UIU/ML (ref 0.27–4.2)

## 2021-07-07 DIAGNOSIS — G43.109 MIGRAINE WITH AURA AND WITHOUT STATUS MIGRAINOSUS, NOT INTRACTABLE: ICD-10-CM

## 2021-07-08 RX ORDER — GALCANEZUMAB 120 MG/ML
120 INJECTION, SOLUTION SUBCUTANEOUS
Qty: 1 PEN | Refills: 5 | Status: SHIPPED
Start: 2021-07-08 | End: 2021-10-14 | Stop reason: SINTOL

## 2021-08-11 ENCOUNTER — NURSE TRIAGE (OUTPATIENT)
Dept: OTHER | Facility: CLINIC | Age: 36
End: 2021-08-11

## 2021-08-11 ENCOUNTER — TELEPHONE (OUTPATIENT)
Dept: FAMILY MEDICINE CLINIC | Age: 36
End: 2021-08-11

## 2021-08-11 NOTE — TELEPHONE ENCOUNTER
Reason for Disposition   [1] Chest pain (or \"angina\") comes and goes AND [2] is happening more often (increasing in frequency) or getting worse (increasing in severity)    Answer Assessment - Initial Assessment Questions  1. LOCATION: \"Where does it hurt? \"        \"Starts on the left side - feels like a tightening - started out as about twice a week but has increased in frequency\"  Patient states that she feels her heart racing when she has this chest pain    2. RADIATION: \"Does the pain go anywhere else? \" (e.g., into neck, jaw, arms, back)      Twice she had noted that the pain went down her arm - fleeting pain - that goes away after a couple of minutes    3. ONSET: \"When did the chest pain begin? \" (Minutes, hours or days)       Onset was about a month ago    4. PATTERN \"Does the pain come and go, or has it been constant since it started? \"  \"Does it get worse with exertion? \"       Intermittent     5. DURATION: \"How long does it last\" (e.g., seconds, minutes, hours)      Lasts a few minutes - random times - no trigger identified    6. SEVERITY: \"How bad is the pain? \"  (e.g., Scale 1-10; mild, moderate, or severe)     - MILD (1-3): doesn't interfere with normal activities      - MODERATE (4-7): interferes with normal activities or awakens from sleep     - SEVERE (8-10): excruciating pain, unable to do any normal activities        Pain at its worst - 6/10; current chest pain 0/10    7. CARDIAC RISK FACTORS: \"Do you have any history of heart problems or risk factors for heart disease? \" (e.g., angina, prior heart attack; diabetes, high blood pressure, high cholesterol, smoker, or strong family history of heart disease)      Family history of CHF; heart murmur since birth that resolved when she was a teenager    6. PULMONARY RISK FACTORS: \"Do you have any history of lung disease? \"  (e.g., blood clots in lung, asthma, emphysema, birth control pills)      No    9. CAUSE: \"What do you think is causing the chest pain? \" Unknown    10. OTHER SYMPTOMS: \"Do you have any other symptoms? \" (e.g., dizziness, nausea, vomiting, sweating, fever, difficulty breathing, cough)        Nausea, headaches (has migraines), sometimes feels internally hot but no fever    11. PREGNANCY: \"Is there any chance you are pregnant? \" \"When was your last menstrual period? \"        No    Protocols used: CHEST PAIN-ADULT-AH    Received call from Katie Gregg at [facility] with [employer] [facility]. Brief description of triage: patient is experiencing intermittent left chest pain (6-7/10) that radiates into her left arm and is increasing in frequency and severity. No sweating but ome nausea present - no shortness of air. Triage indicates for patient to be seen in the ED (patient is not currently experiencing chest pain, however, her chest pain events are becoming more frequent and more severe with pain radiation into her left arm.)    Care advice provided, patient verbalizes understanding; denies any other questions or concerns; instructed to call back for any new or worsening symptoms. Writer provided warm transfer to [address] [place] [address] at [facility] for appointment scheduling of new provider appt and to be used as a follow up to her ED visit. Attention Provider: Thank you for allowing me to participate in the care of your patient. The patient was connected to triage in response to information provided to the [facility]. Please do not respond through this encounter as the response is not directed to a shared pool.

## 2021-08-11 NOTE — TELEPHONE ENCOUNTER
----- Message from Bright Anderson sent at 2021  5:09 PM EDT -----  Subject: Appointment Request    Reason for Call: New Patient Request Appointment    QUESTIONS  Type of Appointment? New Patient/New to Provider  Reason for appointment request? Available appointments did not meet   patient need  Additional Information for Provider? PT would like to get established. PT's father, whom is established with Dr Gilmar Clarke, already spoke with doctor   and was told he would accept the PT. PT was returned from triage and the   RN, Yanira Duran, told her to go to the ER today. Rn advised PT to be seen by Dr. Gilmar Clarke this week or next week. The next available appt in [employer] system was   11/3/21 and did not meet the PT's needs. Pt would like a call to get   scheduled. Symptoms were chest pain and tightening along with a racing   heart beat occasionally. ---------------------------------------------------------------------------  --------------  Odette DANIEL  What is the best way for the office to contact you? OK to leave message on   voicemail  Preferred Call Back Phone Number? [phone]  ---------------------------------------------------------------------------  --------------  SCRIPT ANSWERS  Relationship to Patient? Self  Specialty Confirmation? Primary Care  Is this the first appointment to establish care for a ? No  Have you been diagnosed with, awaiting test results for, or told that you   are suspected of having COVID-19 (Coronavirus)? (If patient has tested   negative or was tested as a requirement for work, school, or travel and   not based on symptoms, answer no)? No  Do you currently have flu-like symptoms including fever or chills, cough,   shortness of breath, difficulty breathing, or new loss of taste or smell? No  Have you had close contact with someone with COVID-19 in the last 14 days? No  (Service Expert  click yes below to proceed with [employer] As Usual   Scheduling)?  Yes

## 2021-08-12 ENCOUNTER — TELEPHONE (OUTPATIENT)
Dept: FAMILY MEDICINE CLINIC | Age: 36
End: 2021-08-12

## 2021-08-12 NOTE — TELEPHONE ENCOUNTER
Pt called stating her work schedule will prevent her from her appointment on 8/17. She would like to be seen that day around 2:45. She is also off work today and tomorrow 8/13. Is there anywhere she can be scheduled as school starts back and she wont be able to get in due to school hours.

## 2021-08-17 ENCOUNTER — OFFICE VISIT (OUTPATIENT)
Dept: FAMILY MEDICINE CLINIC | Age: 36
End: 2021-08-17
Payer: COMMERCIAL

## 2021-08-17 VITALS
DIASTOLIC BLOOD PRESSURE: 70 MMHG | OXYGEN SATURATION: 96 % | WEIGHT: 202 LBS | HEART RATE: 61 BPM | SYSTOLIC BLOOD PRESSURE: 118 MMHG | BODY MASS INDEX: 30.71 KG/M2

## 2021-08-17 DIAGNOSIS — R07.9 CHEST PAIN, UNSPECIFIED TYPE: Primary | ICD-10-CM

## 2021-08-17 DIAGNOSIS — R09.89 THROAT FULLNESS: ICD-10-CM

## 2021-08-17 DIAGNOSIS — R00.2 PALPITATIONS: ICD-10-CM

## 2021-08-17 DIAGNOSIS — Z11.4 ENCOUNTER FOR SCREENING FOR HIV: ICD-10-CM

## 2021-08-17 DIAGNOSIS — G43.109 MIGRAINE WITH AURA AND WITHOUT STATUS MIGRAINOSUS, NOT INTRACTABLE: ICD-10-CM

## 2021-08-17 DIAGNOSIS — Z13.21 ENCOUNTER FOR VITAMIN DEFICIENCY SCREENING: ICD-10-CM

## 2021-08-17 DIAGNOSIS — F41.9 ANXIETY: ICD-10-CM

## 2021-08-17 DIAGNOSIS — E66.09 CLASS 1 OBESITY DUE TO EXCESS CALORIES WITHOUT SERIOUS COMORBIDITY WITH BODY MASS INDEX (BMI) OF 30.0 TO 30.9 IN ADULT: ICD-10-CM

## 2021-08-17 DIAGNOSIS — Z11.59 NEED FOR HEPATITIS C SCREENING TEST: ICD-10-CM

## 2021-08-17 LAB
LIPASE: 34 U/L (ref 13–60)
MAGNESIUM: 2.3 MG/DL (ref 1.8–2.4)
VITAMIN D 25-HYDROXY: 26.2 NG/ML

## 2021-08-17 PROCEDURE — 93000 ELECTROCARDIOGRAM COMPLETE: CPT | Performed by: FAMILY MEDICINE

## 2021-08-17 PROCEDURE — 99214 OFFICE O/P EST MOD 30 MIN: CPT | Performed by: FAMILY MEDICINE

## 2021-08-17 ASSESSMENT — ENCOUNTER SYMPTOMS: NAUSEA: 1

## 2021-08-17 NOTE — PROGRESS NOTES
Dc Denny is a 38 y.o. female    Chief Complaint   Patient presents with    New Patient     nurse triage 8/11/2021    Chest Pain     \"Starts on the left side - feels like a tightening - started out as about twice a week but has increased in frequency\"  Patient states that she feels her heart racing when she has this chest pain, Onset was about a month ago, family history CHF, most recent episode MIKE 8/15     Other     not sure if correlated but has had increased headaches & facial/sinus pain on left hand side more frequently, has a neorologist treating migraines, 3 years ago had dental surgery & serious infection of sinuses/root canal on left side.  Depression    Anxiety     discuss fluoxetine & sertraline, or if different medication may work better, currently not taking       HPI:    This is a new patient to me. Chest Pain   This is a new problem. The current episode started more than 1 month ago. The onset quality is undetermined. The problem occurs intermittently. The problem has been gradually worsening. Associated symptoms include irregular heartbeat and nausea. Pertinent negatives include no fever. The pain is aggravated by nothing. She has tried nothing for the symptoms. Risk factors include obesity. Other  Associated symptoms include chest pain and nausea. Pertinent negatives include no fever. Migraine   This is a chronic problem. The current episode started more than 1 year ago. The problem occurs intermittently. The problem has been waxing and waning. The quality of the pain is described as pulsating. Associated symptoms include nausea. Pertinent negatives include no fever. Nothing aggravates the symptoms. She has tried triptans for the symptoms. The treatment provided moderate relief. Her past medical history is significant for migraine headaches. Anxiety and depression. This is a chronic issue which he currently feels stable.   She has been on fluoxetine and sertraline in the past which did help. Currently she does not think she needs a medicine. She did have a little weight gain with these medicines. BuSpar was given but the patient does not remember if it helped with anxiety or not. ROS:    Review of Systems   Constitutional: Negative for fever. Cardiovascular: Positive for chest pain. Gastrointestinal: Positive for nausea. /70   Pulse 61   Wt 202 lb (91.6 kg)   LMP 03/14/2019   SpO2 96%   BMI 30.71 kg/m²     Physical Exam:    Physical Exam  Constitutional:       General: She is not in acute distress. Appearance: She is well-developed. She is obese. She is not toxic-appearing. HENT:      Head: Normocephalic. Mouth/Throat:      Mouth: Mucous membranes are moist.   Cardiovascular:      Rate and Rhythm: Normal rate and regular rhythm. Pulses: Normal pulses. Heart sounds: No murmur heard. Pulmonary:      Effort: Pulmonary effort is normal. No respiratory distress. Breath sounds: Normal breath sounds. No wheezing. Chest:      Chest wall: No tenderness. Musculoskeletal:      Cervical back: Normal range of motion. No rigidity. Lymphadenopathy:      Cervical: No cervical adenopathy. Neurological:      Mental Status: She is alert. Psychiatric:         Mood and Affect: Mood normal.         Behavior: Behavior normal.         Thought Content: Thought content normal.         Current Outpatient Medications   Medication Sig Dispense Refill    EMGALITY 120 MG/ML SOAJ INJECT 120 MG INTO THE SKIN EVERY 30 DAYS 1 pen 5    eletriptan (RELPAX) 40 MG tablet TAKE ONE-HALF TO ONE TABLET BY MOUTH ONCE AS NEEDED FOR MIGRAINE MAY REPEAT IN 2 HOURS IF NEEDED 9 tablet 5    spironolactone (ALDACTONE) 25 MG tablet Take 25 mg by mouth daily       No current facility-administered medications for this visit. Sinus  Rhythm  -With rate variation   cv = 12.   Voltage criteria for LVH  (S(V1)+R(V6) exceeds 3.50 mV)  -Voltage criteria w/o ST/T abnormality may be normal.    -  Negative precordial T-waves. Assessment:    1. Chest pain, unspecified type    2. Palpitations    3. Migraine with aura and without status migrainosus, not intractable    4. Class 1 obesity due to excess calories without serious comorbidity with body mass index (BMI) of 30.0 to 30.9 in adult    5. Anxiety    6. Encounter for vitamin deficiency screening    7. Need for hepatitis C screening test    8. Encounter for screening for HIV        Plan:    1. Chest pain, unspecified type  EKG was unremarkable. Voltage criteria for LVH seen but her blood pressure is perfect today. Her other vitals are perfect. We will check labs below. May consider Holter monitor if the chest pain and palpitations continue. Consider anxiety and GERD in the differential.  - EKG 12 lead  - CBC Auto Differential; Future  - Comprehensive Metabolic Panel; Future  - Lipase    2. Palpitations  As above. - CBC Auto Differential; Future  - Comprehensive Metabolic Panel; Future  - Vitamin B12 & Folate; Future  - TSH with Reflex; Future  - Magnesium    3. Migraine with aura and without status migrainosus, not intractable  Currently stable with her migraine medicine regimen. 4. Class 1 obesity due to excess calories without serious comorbidity with body mass index (BMI) of 30.0 to 30.9 in adult  Referred to the weight loss clinic.  - Ashley Collins MD, Bariatric Surgery, [facility]    5. Anxiety  Currently the patient feels her symptoms are stable. Consider BuSpar as needed in the future if desired. 6. Encounter for vitamin deficiency screening  - Vitamin D 25 Hydroxy    7. Need for hepatitis C screening test  - Hepatitis C Antibody; Future    8. Encounter for screening for HIV  - HIV Screen; Future      Return in about 4 weeks (around 9/14/2021) for chest pain, palpitation.

## 2021-08-19 DIAGNOSIS — R00.2 PALPITATIONS: Primary | ICD-10-CM

## 2021-08-19 DIAGNOSIS — R07.9 CHEST PAIN, UNSPECIFIED TYPE: ICD-10-CM

## 2021-09-14 NOTE — TELEPHONE ENCOUNTER
-Patient currently hypotensive, continue with lisinopril 10 mg daily and Toprol-XL 25 mg daily Tried to call patient, no answer, mailbox full unable to leave message

## 2021-09-27 ENCOUNTER — OFFICE VISIT (OUTPATIENT)
Dept: BARIATRICS/WEIGHT MGMT | Age: 36
End: 2021-09-27

## 2021-09-27 VITALS
WEIGHT: 204 LBS | HEART RATE: 61 BPM | BODY MASS INDEX: 32.78 KG/M2 | SYSTOLIC BLOOD PRESSURE: 156 MMHG | HEIGHT: 66 IN | DIASTOLIC BLOOD PRESSURE: 73 MMHG

## 2021-09-27 DIAGNOSIS — E66.9 OBESITY (BMI 30.0-34.9): ICD-10-CM

## 2021-09-27 PROCEDURE — 99999 PR OFFICE/OUTPT VISIT,PROCEDURE ONLY: CPT

## 2021-09-27 NOTE — PROGRESS NOTES
Eduard Reich is a 43 y.o. female with a date of birth of 1985. Vitals:    09/27/21 1025   BP: (!) 156/73   Pulse: 61   Weight: 204 lb (92.5 kg)   Height: 5' 6\" (1.676 m)    BMI: Body mass index is 32.93 kg/m². Obesity Classification: Class II    Weight History: Wt Readings from Last 3 Encounters:   09/27/21 204 lb (92.5 kg)   08/17/21 202 lb (91.6 kg)   04/02/21 202 lb 2 oz (91.7 kg)       Patient's lowest adult weight was 120 lb at age 19. Patient's highest adult weight was 215 lb at age 27. Patient has participated in the following weight loss programs: Weight Watcher Anonymous, , nutritional counseling with RD, calorie restriction, keto, low carb and boot camp. Patient has participated in meal replacement/liquid diets - [employer]oc[employer]. Patient has not participated in weight loss medications. Patient is not lactose intolerant. Patient does not have Scientologist/cultural food concerns. Patient does not have food allergies. Pt avoids salad because of IBS (but eats anyway)  Pt avoids dairy - because of kidney stones  24 hour recall/food frequency chart: PT DOES INTERMITTENT FASTING - EATS WITHIN 8 HOURS. PT EATS HIGHER FAT, LOWER CARB ON MON AND TUES. PT HAS BEEN EATING 90-110G PROTEIN, 60G FAT  Breakfast: no.   Snack: no.   Lunch: yes. 11:30 -  Salad with grilled chix, broccoli, 1/3 to 1/2c avocado, weeks, cheese OR greens, chix, fruit  Snack: yes. 2pm -Nuts OR smartpop popcorn OR nut butter with fruit OR [employer] protein bar OR Premier protein shake  2nd meal - 4pm - protein shake OR eggs and weeks  Dinner: yes. 6:30pm - Salad same as lunch OR bbq chix tenders, green beans, fruit OR burger no bun OR taco bowl  Snack: no.   Drinks throughout the day: water - 128oz water  Do you drink alcohol? no.    Patient does not meet the criteria for binge eating disorder. Patient does not have grazing. Patient does not have night eating.  Patient does not have a history of emotional eating or eating out of boredom. Goals  Weight: 150-160lb  Health Improvement: PCOS, kidney stones, migraines, arrhythmia    Assessment  Nutritional Needs: RMR=(9.99 x 92.5) + (6.25 x 168) - (4.92 x 36 y.o.) -161  = 1626 kcal x 1.4 (sedentary activity factor)= 2276 kcal - 1000 (for 2 lb weight loss/week)= 1276 kcal.  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 Optifast:  Not interested  Diet Medications: May be interested; stated in chart that Topomax causes kidney stones    PES Statement:  Overweight/Obesity related to diet, lifestyle, lack of exercise as evidenced by BMI. Body mass index is 32.93 kg/m². Will follow up as necessary.     Karina Espinoza RD, LD

## 2021-10-02 ENCOUNTER — TELEMEDICINE (OUTPATIENT)
Dept: BARIATRICS/WEIGHT MGMT | Age: 36
End: 2021-10-02
Payer: COMMERCIAL

## 2021-10-02 DIAGNOSIS — Z71.3 DIETARY COUNSELING AND SURVEILLANCE: ICD-10-CM

## 2021-10-02 DIAGNOSIS — E66.9 CLASS 1 OBESITY: Primary | ICD-10-CM

## 2021-10-02 DIAGNOSIS — N20.0 KIDNEY STONES: ICD-10-CM

## 2021-10-02 PROCEDURE — 99204 OFFICE O/P NEW MOD 45 MIN: CPT | Performed by: FAMILY MEDICINE

## 2021-10-02 ASSESSMENT — ENCOUNTER SYMPTOMS
VOMITING: 0
BLOOD IN STOOL: 0
NAUSEA: 0
ABDOMINAL PAIN: 0
COUGH: 0
CHEST TIGHTNESS: 0
EYE PAIN: 0
CONSTIPATION: 0
CHOKING: 0
DIARRHEA: 0
SHORTNESS OF BREATH: 0
APNEA: 0
PHOTOPHOBIA: 0
WHEEZING: 0
ABDOMINAL DISTENTION: 0

## 2021-10-02 NOTE — PROGRESS NOTES
Patient: Sage Call     Encounter Date: 10/2/2021    YOB: 1985               Age: 43 y.o. Patient identification was verified at the start of the visit. Patient-Reported Vitals 10/2/2021   Patient-Reported Weight 204   Patient-Reported Height 5' 7\"   Patient-Reported Temperature 97.6         BP Readings from Last 1 Encounters:   09/27/21 (!) 156/73       BMI Readings from Last 1 Encounters:   09/27/21 32.93 kg/m²       Pulse Readings from Last 1 Encounters:   09/27/21 61                                               Chief Complaint   Patient presents with    Bariatric, Initial Visit     Health system       HPI:    43 y.o. female presents to establish care via video visit. The patient's medical history is significant for class I obesity. The patient has a long-standing history of obesity which started gradually. The problem is mild. The patient has been gaining weight. Risk factors include annual weight gain of >2 lbs (1 kg)/ year and sedentary lifestyle. Aggravating factors include poor diet and lack of physical activity. The patient has tried various diet/exercise plans which have been ineffective in the long-run. She is motivated to start losing weight to help improve her overall health. Feels comfortable with current diet plan  Intermittent fasting  Low carb  Low oxalate diet (h/o kidney stones)  Has had several meetings with dietitians in the past  Exercising regularly  Has   Frustrated with lack of weight loss  Lots of weight gain during pregnancies and worse after total hysterectomy      When did you become overweight? [] Childhood   [] Teens   [x] Adulthood   [] Pregnancy   [] Menopause    Highest adult weight: 215 pounds at age 27    Triggers for weight gain?    [] Stress   [] Illness   [] Medications   [] Travel  []Injury     [] Nightshift work   [] Insomnia  [] No specific triggers   [x] Other: pregnancy, hysterectomy     Food triggers:   [] Stress   [] Boredom cystitis     Kidney stones     x2    Migraines     Obesity (BMI 30.0-34. 9)     PCOS (polycystic ovarian syndrome)     Prolonged emergence from general anesthesia     Stomach ulcer     Urinary incontinence        Past Surgical History:   Procedure Laterality Date    BLADDER SURGERY      CYSTOSCOPY      lithotripsy for stone extraction    ECTOPIC PREGNANCY SURGERY  12/21/13    ectopic pregnancy excision laparoscopic left linear salpingostomy    HYSTERECTOMY ABDOMINAL Bilateral 3/18/2019    ROBOTIC ASSISTED TOTAL LAPAROSCOPIC HYSTERECTOMY, BILATERAL SALPINGECTION   performed by Angy Mills MD at [facility]  10/05/2017     DxLS, Evacuation of ectopic pregnancy-fimbriated portion of tube    TONSILLECTOMY      WISDOM TOOTH EXTRACTION         Family History   Problem Relation Age of Onset    Cancer Maternal Grandmother     Hypertension Maternal Grandmother     Elevated Lipids Maternal Grandmother     Cancer Paternal Grandmother     High Cholesterol Paternal Grandmother     Hypertension Paternal Grandmother     Elevated Lipids Paternal Grandmother     Breast Cancer Paternal Grandmother     Parkinsonism Mother     Depression Mother     Migraines Father     Arthritis Father     Arthritis Maternal Uncle     Diabetes Maternal Uncle     High Blood Pressure Maternal Uncle     Arthritis Maternal Grandfather     Diabetes Maternal Grandfather     Heart Disease Maternal Grandfather     High Blood Pressure Maternal Grandfather     High Cholesterol Maternal Grandfather     High Cholesterol Paternal Grandfather     High Blood Pressure Paternal Grandfather     Cancer Paternal Grandfather         esphageal    COPD Paternal Grandfather        Review of Systems   Constitutional: Negative for fatigue. Eyes: Negative for photophobia, pain and visual disturbance.    Respiratory: Negative for apnea, cough, choking, chest tightness, shortness of breath and wheezing. Cardiovascular: Negative for chest pain, palpitations and leg swelling. Gastrointestinal: Negative for abdominal distention, abdominal pain, blood in stool, constipation, diarrhea, nausea and vomiting. Endocrine: Negative for cold intolerance and heat intolerance. Musculoskeletal: Negative for arthralgias and myalgias. Skin: Negative for rash. Neurological: Negative for dizziness, tremors, syncope, weakness, numbness and headaches. Psychiatric/Behavioral: Negative for agitation, confusion, decreased concentration, dysphoric mood, hallucinations, sleep disturbance and suicidal ideas. The patient is not nervous/anxious and is not hyperactive. Physical Exam  Constitutional:       Appearance: She is well-developed. HENT:      Head: Normocephalic. Eyes:      Conjunctiva/sclera: Conjunctivae normal.   Abdominal:      General: Abdomen is protuberant. Musculoskeletal:         General: No swelling. Neurological:      Mental Status: She is alert and oriented to person, place, and time. Psychiatric:         Mood and Affect: Mood normal.         Behavior: Behavior normal.         Thought Content: Thought content normal.         Judgment: Judgment normal.         Orders Only on 08/17/2021   Component Date Value Ref Range Status    HIV Ag/Ab 08/17/2021 Non-Reactive  Non-reactive Final    HIV-1 Antibody 08/17/2021 Non-Reactive  Non-reactive Final    HIV ANTIGEN 08/17/2021 Non-Reactive  Non-reactive Final    HIV-2 Ab 08/17/2021 Non-Reactive  Non-reactive Final    Hep C Ab Interp 08/17/2021 Non-reactive  Non-reactive Final    TSH 08/17/2021 1.01  0.27 - 4.20 uIU/mL Final    Vitamin B-12 08/17/2021 533  211 - 911 pg/mL Final    Folate 08/17/2021 13.59  4.78 - 24.20 ng/mL Final    Comment: Effective 11-15-16 10:00am EST  Please note reference ranges have  changed for Folate.       Sodium 08/17/2021 139  136 - 145 mmol/L Final    Potassium 08/17/2021 3.9  3.5 - 5.1 mmol/L Final    Chloride 08/17/2021 103  99 - 110 mmol/L Final    CO2 08/17/2021 21  21 - 32 mmol/L Final    Anion Gap 08/17/2021 15  3 - 16 Final    Glucose 08/17/2021 91  70 - 99 mg/dL Final    BUN 08/17/2021 14  7 - 20 mg/dL Final    CREATININE 08/17/2021 0.5* 0.6 - 1.1 mg/dL Final    GFR Non- 08/17/2021 >60  >60 Final    Comment: >60 mL/min/1.73m2 EGFR, calc. for ages 25 and older using the  MDRD formula (not corrected for weight), is valid for stable  renal function.  GFR  08/17/2021 >60  >60 Final    Comment: Chronic Kidney Disease: less than 60 ml/min/1.73 sq.m. Kidney Failure: less than 15 ml/min/1.73 sq.m. Results valid for patients 18 years and older.       Calcium 08/17/2021 9.1  8.3 - 10.6 mg/dL Final    Total Protein 08/17/2021 7.1  6.4 - 8.2 g/dL Final    Albumin 08/17/2021 4.5  3.4 - 5.0 g/dL Final    Albumin/Globulin Ratio 08/17/2021 1.7  1.1 - 2.2 Final    Total Bilirubin 08/17/2021 0.5  0.0 - 1.0 mg/dL Final    Alkaline Phosphatase 08/17/2021 69  40 - 129 U/L Final    ALT 08/17/2021 24  10 - 40 U/L Final    AST 08/17/2021 20  15 - 37 U/L Final    Globulin 08/17/2021 2.6  g/dL Final    WBC 08/17/2021 7.7  4.0 - 11.0 K/uL Final    RBC 08/17/2021 4.68  4.00 - 5.20 M/uL Final    Hemoglobin 08/17/2021 13.9  12.0 - 16.0 g/dL Final    Hematocrit 08/17/2021 39.6  36.0 - 48.0 % Final    MCV 08/17/2021 84.6  80.0 - 100.0 fL Final    MCH 08/17/2021 29.7  26.0 - 34.0 pg Final    MCHC 08/17/2021 35.2  31.0 - 36.0 g/dL Final    RDW 08/17/2021 13.1  12.4 - 15.4 % Final    Platelets 28/62/7120 283  135 - 450 K/uL Final    MPV 08/17/2021 8.1  5.0 - 10.5 fL Final    Neutrophils % 08/17/2021 61.4  % Final    Lymphocytes % 08/17/2021 27.2  % Final    Monocytes % 08/17/2021 9.0  % Final    Eosinophils % 08/17/2021 2.1  % Final    Basophils % 08/17/2021 0.3  % Final    Neutrophils Absolute 08/17/2021 4.7  1.7 - 7.7 K/uL Final    Lymphocytes Absolute 08/17/2021 2.1  1.0 - 5.1 K/uL Final    Monocytes Absolute 08/17/2021 0.7  0.0 - 1.3 K/uL Final    Eosinophils Absolute 08/17/2021 0.2  0.0 - 0.6 K/uL Final    Basophils Absolute 08/17/2021 0.0  0.0 - 0.2 K/uL Final         Assessment and Plan:    1. Class 1 obesity  Heavily counseled on the importance of therapeutic lifestyle changes through diet and exercise. The patient understands that the goal of treatment is to reach and stay at a healthy weight. The initial treatment goal is to lose at least 5-10% of her body weight in 12 weeks. This will require changes in eating habits, increased physical activity, and behavior changes. Counseled on low carb/justin diet. Patient handouts and education material reviewed in detail with the patient. All questions answered. Encouraged patient to keep a food journal and to bring it to her next visit. Discussed available treatment options in addition to lifestyle changes including medications or OPTIFAST. She is interested  anti-obesity medications. Contrave or Gabi Bookbinder may be recommended at the next visit depending on her progress and lab results. she understands that medications are most effective as part of a comprehensive treatment plan that includes nutrition, physical activity, and behavior modification. The patient also understands that she will need close follow-ups every 2-4 weeks if she starts treatment. Patient advised that it is her responsibility to follow up on any ordered tests/lab results. Patient understands and agrees with the plan. 2. Dietary counseling and surveillance  1200-Justin/low carb meal plan     3.  Kidney stones  Low oxalate diet per urologist  Avoid Qsymia         Nutrition:  [] LCHF/Ketogenic [x] Low carb/low-calorie diet [] Low-calorie diet     []Maintenance        FITTE:   [x] Cardio [] Resistance/stength exercises   [x] ACSM recommendations (150 minutes/week)           Behavior:   [x] Motivational interviewing performed    [] Referral for counseling  [x] Discussed strategies to overcome habits/challenges for focus      [x] Stress management   [x] Stimulus control  [x] Sleep hygiene      No orders of the defined types were placed in this encounter. No follow-ups on file. Nigel Gonzalez is a 43 y.o. female being evaluated by a Virtual Visit (video visit) encounter to address concerns as mentioned above. A caregiver was present when appropriate. Due to this being a TeleHealth encounter (During CZIXV-79 public health emergency), evaluation of the following organ systems was limited: Vitals/Constitutional/EENT/Resp/CV/GI//MS/Neuro/Skin/Heme-Lymph-Imm. Pursuant to the emergency declaration under the [address] [place] [address] authority and the [employer] Act, this Virtual Visit was conducted with patient's (and/or legal guardian's) consent, to reduce the patient's risk of exposure to COVID-19 and provide necessary medical care. The patient (and/or legal guardian) has also been advised to contact this office for worsening conditions or problems, and seek emergency medical treatment and/or call 911 if deemed necessary. Services were provided through a video synchronous discussion virtually to substitute for in-person clinic visit. Patient and provider were located at their individual homes. --Marie Smith MD on 10/2/2021 at 10:07 AM    An electronic signature was used to authenticate this note. Greater than 50% of this 45 minute visit was used in direct counseling.

## 2021-10-04 VITALS — BODY MASS INDEX: 32.02 KG/M2 | WEIGHT: 204 LBS | HEIGHT: 67 IN

## 2021-10-14 ENCOUNTER — OFFICE VISIT (OUTPATIENT)
Dept: FAMILY MEDICINE CLINIC | Age: 36
End: 2021-10-14
Payer: COMMERCIAL

## 2021-10-14 VITALS
OXYGEN SATURATION: 98 % | BODY MASS INDEX: 32.58 KG/M2 | SYSTOLIC BLOOD PRESSURE: 140 MMHG | WEIGHT: 208 LBS | HEART RATE: 70 BPM | DIASTOLIC BLOOD PRESSURE: 74 MMHG

## 2021-10-14 DIAGNOSIS — E66.09 CLASS 1 OBESITY DUE TO EXCESS CALORIES WITHOUT SERIOUS COMORBIDITY WITH BODY MASS INDEX (BMI) OF 30.0 TO 30.9 IN ADULT: ICD-10-CM

## 2021-10-14 DIAGNOSIS — F41.9 ANXIETY: ICD-10-CM

## 2021-10-14 DIAGNOSIS — R00.2 PALPITATIONS: ICD-10-CM

## 2021-10-14 DIAGNOSIS — R53.82 CHRONIC FATIGUE: ICD-10-CM

## 2021-10-14 DIAGNOSIS — G43.109 MIGRAINE WITH AURA AND WITHOUT STATUS MIGRAINOSUS, NOT INTRACTABLE: ICD-10-CM

## 2021-10-14 DIAGNOSIS — R07.9 CHEST PAIN, UNSPECIFIED TYPE: Primary | ICD-10-CM

## 2021-10-14 PROCEDURE — 99214 OFFICE O/P EST MOD 30 MIN: CPT | Performed by: FAMILY MEDICINE

## 2021-10-14 RX ORDER — PROPRANOLOL HCL 60 MG
60 CAPSULE, EXTENDED RELEASE 24HR ORAL DAILY
Qty: 30 CAPSULE | Refills: 5 | Status: ON HOLD | OUTPATIENT
Start: 2021-10-14 | End: 2022-07-27

## 2021-10-14 SDOH — ECONOMIC STABILITY: FOOD INSECURITY: WITHIN THE PAST 12 MONTHS, YOU WORRIED THAT YOUR FOOD WOULD RUN OUT BEFORE YOU GOT MONEY TO BUY MORE.: NEVER TRUE

## 2021-10-14 SDOH — ECONOMIC STABILITY: FOOD INSECURITY: WITHIN THE PAST 12 MONTHS, THE FOOD YOU BOUGHT JUST DIDN'T LAST AND YOU DIDN'T HAVE MONEY TO GET MORE.: NEVER TRUE

## 2021-10-14 ASSESSMENT — SOCIAL DETERMINANTS OF HEALTH (SDOH): HOW HARD IS IT FOR YOU TO PAY FOR THE VERY BASICS LIKE FOOD, HOUSING, MEDICAL CARE, AND HEATING?: NOT HARD AT ALL

## 2021-10-14 ASSESSMENT — ENCOUNTER SYMPTOMS: NAUSEA: 1

## 2021-10-14 NOTE — PROGRESS NOTES
Babatunde Kilgore is a 43 y.o. female    Chief Complaint   Patient presents with    Chest Pain     no pain     Palpitations     Heart races at least once a week, no pain        HPI:      Chest Pain   This is a new problem. The current episode started more than 1 month ago. The onset quality is undetermined. The problem occurs intermittently. The problem has been gradually improving. Associated symptoms include irregular heartbeat and nausea. Pertinent negatives include no fever. The pain is aggravated by nothing. She has tried nothing for the symptoms. Risk factors include obesity. Other  Associated symptoms include chest pain and nausea. Pertinent negatives include no fever. Migraine   This is a recurrent problem. The current episode started more than 1 year ago. The problem occurs intermittently. The problem has been gradually worsening. The quality of the pain is described as pulsating. Associated symptoms include nausea. Pertinent negatives include no fever. Nothing aggravates the symptoms. She has tried triptans for the symptoms. The treatment provided moderate relief. Her past medical history is significant for migraine headaches. Anxiety and depression. This is a chronic issue which he currently feels stable. She has been on fluoxetine and sertraline in the past which did help. Currently she does not think she needs a medicine. She did have a little weight gain with these medicines. BuSpar was given but the patient does not remember if it helped with anxiety or not. ROS:    Review of Systems   Constitutional: Negative for fever. Cardiovascular: Positive for chest pain. Gastrointestinal: Positive for nausea. BP (!) 140/74   Pulse 70   Wt 208 lb (94.3 kg)   LMP 03/14/2019   SpO2 98%   BMI 32.58 kg/m²     Physical Exam:    Physical Exam  Constitutional:       General: She is not in acute distress. Appearance: She is well-developed. She is obese. She is not toxic-appearing. HENT:      Head: Normocephalic. Mouth/Throat:      Mouth: Mucous membranes are moist.   Neurological:      Mental Status: She is alert. Psychiatric:         Mood and Affect: Mood normal.         Behavior: Behavior normal.         Thought Content: Thought content normal.         Current Outpatient Medications   Medication Sig Dispense Refill    propranolol (INDERAL LA) 60 MG extended release capsule Take 1 capsule by mouth daily 30 capsule 5    eletriptan (RELPAX) 40 MG tablet TAKE ONE-HALF TO ONE TABLET BY MOUTH ONCE AS NEEDED FOR MIGRAINE MAY REPEAT IN 2 HOURS IF NEEDED 9 tablet 5    spironolactone (ALDACTONE) 25 MG tablet Take 25 mg by mouth daily (Patient not taking: Reported on 9/27/2021)       No current facility-administered medications for this visit. Sinus  Rhythm  -With rate variation   cv = 12. Voltage criteria for LVH  (S(V1)+R(V6) exceeds 3.50 mV)  -Voltage criteria w/o ST/T abnormality may be normal.    -  Negative precordial T-waves. Assessment:    1. Chest pain, unspecified type    2. Palpitations    3. Migraine with aura and without status migrainosus, not intractable    4. Class 1 obesity due to excess calories without serious comorbidity with body mass index (BMI) of 30.0 to 30.9 in adult    5. Anxiety    6. Chronic fatigue        Plan:    1. Chest pain, unspecified type  Improved. Last EKG was unremarkable. - EKG 12 lead  - CBC Auto Differential; Future  - Comprehensive Metabolic Panel; Future  - Lipase    2. Palpitations  Improved. The propranolol for migraine prophylaxis can help with palpitations as well. - CBC Auto Differential; Future  - Comprehensive Metabolic Panel; Future  - Vitamin B12 & Folate; Future  - TSH with Reflex; Future  - Magnesium    3. Migraine with aura and without status migrainosus, not intractable  Try propranolol. If no improvement, she could consider the Topamax for a short time.     4. Class 1 obesity due to excess calories without serious

## 2021-11-05 RX ORDER — SUMATRIPTAN 100 MG/1
100 TABLET, FILM COATED ORAL
Qty: 9 TABLET | Refills: 5 | Status: SHIPPED | OUTPATIENT
Start: 2021-11-05 | End: 2021-11-05

## 2021-11-05 RX ORDER — ELETRIPTAN HYDROBROMIDE 40 MG/1
TABLET, FILM COATED ORAL
Qty: 6 TABLET | Refills: 5 | Status: SHIPPED | OUTPATIENT
Start: 2021-11-05

## 2021-11-05 RX ORDER — ONDANSETRON 4 MG/1
4 TABLET, ORALLY DISINTEGRATING ORAL EVERY 8 HOURS PRN
Qty: 30 TABLET | Refills: 1 | Status: ON HOLD
Start: 2021-11-05 | End: 2022-03-21 | Stop reason: HOSPADM

## 2021-11-30 ENCOUNTER — E-VISIT (OUTPATIENT)
Dept: INTERNAL MEDICINE CLINIC | Age: 36
End: 2021-11-30
Payer: COMMERCIAL

## 2021-11-30 DIAGNOSIS — J06.9 ACUTE UPPER RESPIRATORY INFECTION: Primary | ICD-10-CM

## 2021-11-30 PROCEDURE — 99422 OL DIG E/M SVC 11-20 MIN: CPT | Performed by: INTERNAL MEDICINE

## 2021-11-30 ASSESSMENT — LIFESTYLE VARIABLES: SMOKING_STATUS: NO, I'VE NEVER SMOKED

## 2021-12-01 RX ORDER — AMOXICILLIN 500 MG/1
500 CAPSULE ORAL 3 TIMES DAILY
Qty: 21 CAPSULE | Refills: 0 | Status: SHIPPED | OUTPATIENT
Start: 2021-12-01 | End: 2021-12-08

## 2021-12-01 NOTE — PROGRESS NOTES
11-20 minutes were spent on the digital evaluation and management of this patient. Diagnoses and associated orders for this visit:     Acute upper respiratory infection   amoxicillin (AMOXIL) 500 MG capsule; Take 1 capsule by mouth 3 times daily for 7 days    Your Evisit was sent to me because your provider was not available to respond to your Evisit. I have sent your medication to the pharmacy on file. If not better, please schedule for an in person or a virtual video visit instead of an Evisit so you can be examined and interact with your provider to better diagnose and treat your symptoms.

## 2021-12-23 ENCOUNTER — TELEPHONE (OUTPATIENT)
Dept: FAMILY MEDICINE CLINIC | Age: 36
End: 2021-12-23

## 2021-12-23 NOTE — TELEPHONE ENCOUNTER
Pt. Called requesting an appointment for a sore throat with blisters. States she has a cough and thinks she might have chest congestion. States she had seen ENT for post op visit due to sinus surgery. They confirmed she did not have a sinus infection. Due to her symptoms I attempted to schedule her an appointment with a virtualist.  Due to the patient feeling like she needed to have her chest listened too she declined to schedule an appointment  Pt. Stated \" What are they going to do, randomly order me medication that they aren't even sure I needed\"   Pt. Stated she will just go to urgent care.

## 2021-12-28 ENCOUNTER — APPOINTMENT (OUTPATIENT)
Dept: GENERAL RADIOLOGY | Age: 36
End: 2021-12-28
Payer: COMMERCIAL

## 2021-12-28 ENCOUNTER — HOSPITAL ENCOUNTER (EMERGENCY)
Age: 36
Discharge: HOME | End: 2021-12-28
Payer: COMMERCIAL

## 2021-12-28 VITALS
OXYGEN SATURATION: 97 % | SYSTOLIC BLOOD PRESSURE: 129 MMHG | BODY MASS INDEX: 30.31 KG/M2 | WEIGHT: 200 LBS | HEART RATE: 87 BPM | RESPIRATION RATE: 14 BRPM | TEMPERATURE: 96.5 F | DIASTOLIC BLOOD PRESSURE: 82 MMHG | HEIGHT: 68 IN

## 2021-12-28 DIAGNOSIS — R13.10 DYSPHAGIA, UNSPECIFIED TYPE: ICD-10-CM

## 2021-12-28 DIAGNOSIS — R05.9 COUGH: Primary | ICD-10-CM

## 2021-12-28 LAB
A/G RATIO: 1.3 (ref 1.1–2.2)
ALBUMIN SERPL-MCNC: 4.4 G/DL (ref 3.4–5)
ALP BLD-CCNC: 97 U/L (ref 40–129)
ALT SERPL-CCNC: 36 U/L (ref 10–40)
ANION GAP SERPL CALCULATED.3IONS-SCNC: 12 MMOL/L (ref 3–16)
AST SERPL-CCNC: 23 U/L (ref 15–37)
BASOPHILS ABSOLUTE: 0.1 K/UL (ref 0–0.2)
BASOPHILS RELATIVE PERCENT: 0.8 %
BILIRUB SERPL-MCNC: 0.4 MG/DL (ref 0–1)
BUN BLDV-MCNC: 9 MG/DL (ref 7–20)
CALCIUM SERPL-MCNC: 9.3 MG/DL (ref 8.3–10.6)
CHLORIDE BLD-SCNC: 104 MMOL/L (ref 99–110)
CO2: 23 MMOL/L (ref 21–32)
CREAT SERPL-MCNC: 0.6 MG/DL (ref 0.6–1.1)
EOSINOPHILS ABSOLUTE: 0.2 K/UL (ref 0–0.6)
EOSINOPHILS RELATIVE PERCENT: 2.6 %
GFR AFRICAN AMERICAN: >60
GFR NON-AFRICAN AMERICAN: >60
GLUCOSE BLD-MCNC: 120 MG/DL (ref 70–99)
HCT VFR BLD CALC: 42.5 % (ref 36–48)
HEMOGLOBIN: 14.5 G/DL (ref 12–16)
LYMPHOCYTES ABSOLUTE: 1.7 K/UL (ref 1–5.1)
LYMPHOCYTES RELATIVE PERCENT: 19.4 %
MCH RBC QN AUTO: 28.7 PG (ref 26–34)
MCHC RBC AUTO-ENTMCNC: 34.2 G/DL (ref 31–36)
MCV RBC AUTO: 84 FL (ref 80–100)
MONOCYTES ABSOLUTE: 0.7 K/UL (ref 0–1.3)
MONOCYTES RELATIVE PERCENT: 8.5 %
NEUTROPHILS ABSOLUTE: 5.9 K/UL (ref 1.7–7.7)
NEUTROPHILS RELATIVE PERCENT: 68.7 %
PDW BLD-RTO: 12.8 % (ref 12.4–15.4)
PLATELET # BLD: 350 K/UL (ref 135–450)
PMV BLD AUTO: 6.7 FL (ref 5–10.5)
POTASSIUM REFLEX MAGNESIUM: 3.7 MMOL/L (ref 3.5–5.1)
RBC # BLD: 5.06 M/UL (ref 4–5.2)
S PYO AG THROAT QL: NEGATIVE
SODIUM BLD-SCNC: 139 MMOL/L (ref 136–145)
TOTAL PROTEIN: 7.8 G/DL (ref 6.4–8.2)
TROPONIN: <0.01 NG/ML
WBC # BLD: 8.5 K/UL (ref 4–11)

## 2021-12-28 PROCEDURE — 96374 THER/PROPH/DIAG INJ IV PUSH: CPT

## 2021-12-28 PROCEDURE — 6360000002 HC RX W HCPCS: Performed by: PHYSICIAN ASSISTANT

## 2021-12-28 PROCEDURE — 87081 CULTURE SCREEN ONLY: CPT

## 2021-12-28 PROCEDURE — 85025 COMPLETE CBC W/AUTO DIFF WBC: CPT

## 2021-12-28 PROCEDURE — 6370000000 HC RX 637 (ALT 250 FOR IP): Performed by: PHYSICIAN ASSISTANT

## 2021-12-28 PROCEDURE — 93005 ELECTROCARDIOGRAM TRACING: CPT

## 2021-12-28 PROCEDURE — 87880 STREP A ASSAY W/OPTIC: CPT

## 2021-12-28 PROCEDURE — 99283 EMERGENCY DEPT VISIT LOW MDM: CPT

## 2021-12-28 PROCEDURE — 96375 TX/PRO/DX INJ NEW DRUG ADDON: CPT

## 2021-12-28 PROCEDURE — 84484 ASSAY OF TROPONIN QUANT: CPT

## 2021-12-28 PROCEDURE — 80053 COMPREHEN METABOLIC PANEL: CPT

## 2021-12-28 PROCEDURE — 71046 X-RAY EXAM CHEST 2 VIEWS: CPT

## 2021-12-28 RX ORDER — CODEINE PHOSPHATE AND GUAIFENESIN 10; 100 MG/5ML; MG/5ML
5 SOLUTION ORAL ONCE
Status: COMPLETED | OUTPATIENT
Start: 2021-12-28 | End: 2021-12-28

## 2021-12-28 RX ORDER — DIPHENHYDRAMINE HYDROCHLORIDE 50 MG/ML
25 INJECTION INTRAMUSCULAR; INTRAVENOUS ONCE
Status: COMPLETED | OUTPATIENT
Start: 2021-12-28 | End: 2021-12-28

## 2021-12-28 RX ORDER — TOPIRAMATE 25 MG/1
50 TABLET ORAL 2 TIMES DAILY
Status: DISCONTINUED | OUTPATIENT
Start: 2021-12-28 | End: 2021-12-28 | Stop reason: HOSPADM

## 2021-12-28 RX ORDER — GUAIFENESIN AND CODEINE PHOSPHATE 100; 10 MG/5ML; MG/5ML
2.5 SOLUTION ORAL 2 TIMES DAILY PRN
Qty: 20 ML | Refills: 0 | Status: SHIPPED | OUTPATIENT
Start: 2021-12-28 | End: 2021-12-31

## 2021-12-28 RX ORDER — KETOROLAC TROMETHAMINE 30 MG/ML
15 INJECTION, SOLUTION INTRAMUSCULAR; INTRAVENOUS ONCE
Status: COMPLETED | OUTPATIENT
Start: 2021-12-28 | End: 2021-12-28

## 2021-12-28 RX ADMIN — LIDOCAINE HYDROCHLORIDE: 20 SOLUTION ORAL; TOPICAL at 20:53

## 2021-12-28 RX ADMIN — KETOROLAC TROMETHAMINE 15 MG: 30 INJECTION, SOLUTION INTRAMUSCULAR at 19:11

## 2021-12-28 RX ADMIN — GUAIFENESIN AND CODEINE PHOSPHATE 5 ML: 100; 10 SOLUTION ORAL at 20:40

## 2021-12-28 RX ADMIN — DIPHENHYDRAMINE HYDROCHLORIDE 25 MG: 50 INJECTION, SOLUTION INTRAMUSCULAR; INTRAVENOUS at 19:10

## 2021-12-28 ASSESSMENT — PAIN SCALES - GENERAL: PAINLEVEL_OUTOF10: 6

## 2021-12-28 NOTE — ED PROVIDER NOTES
[facility] Emergency Department    CHIEF COMPLAINT  Cough (with tightness in chest, \"in my esophagus and my throat hurts\"  pt also c/o migrain. just finished Z pack, COVID negative on 12/24)      SHARED SERVICE VISIT  Evaluated by TRUE. My supervising physician was available for consultation. HISTORY OF PRESENT ILLNESS  Ana Langston is a 43 y.o. female history of migraine presents emergency department for evaluation of cough, sore throat as well as a migraine. Patient states that she has had a persistent cough and sinus issues over the past month. Does report sinus polyp removal few weeks prior. Patient states that she was seen on 12/20 for an urgent care and was placed on azithromycin for cough. Reports negative Covid test on 24th. At this time patient reports coughing up green sputum in the morning. Patient also reports some esophageal discomfort, worsening when lying down. Reports difficulty swallowing as well as sensation of food getting stuck. No prior endoscopy. Was recently placed on Prilosec 3 days ago. No pleuritic chest pain. Reports generalized tightness in the chest.  No history of coagulopathy recent flights, hemoptysis or unilateral leg swelling. No other complaints, modifying factors or associated symptoms. Nursing notes reviewed. Past Medical History:   Diagnosis Date    Anxiety     Depression     Ectopic pregnancy 2013    Endometriosis     IBS (irritable bowel syndrome)     Infertility, female     Has seen Dr. Edna Asencio in past multiple IUI's natural conception this pregnancy.  Interstitial cystitis     Kidney stones     x2    Migraines     Obesity (BMI 30.0-34. 9)     PCOS (polycystic ovarian syndrome)     Prolonged emergence from general anesthesia     Stomach ulcer     Urinary incontinence      Past Surgical History:   Procedure Laterality Date    BLADDER SURGERY      CYSTOSCOPY      lithotripsy for stone extraction    ECTOPIC PREGNANCY SURGERY  12/21/13    ectopic pregnancy excision laparoscopic left linear salpingostomy    HYSTERECTOMY ABDOMINAL Bilateral 3/18/2019    ROBOTIC ASSISTED TOTAL LAPAROSCOPIC HYSTERECTOMY, BILATERAL SALPINGECTION   performed by [facility] at [facility]  10/05/2017     DxLS, Evacuation of ectopic pregnancy-fimbriated portion of tube    TONSILLECTOMY      WISDOM TOOTH EXTRACTION       Family History   Problem Relation Age of Onset    Cancer Maternal Grandmother     Hypertension Maternal Grandmother     Elevated Lipids Maternal Grandmother     Cancer Paternal Grandmother     High Cholesterol Paternal Grandmother     Hypertension Paternal Grandmother     Elevated Lipids Paternal Grandmother     Breast Cancer Paternal Grandmother     Parkinsonism Mother     Depression Mother     Migraines Father     Arthritis Father     Arthritis Maternal Uncle     Diabetes Maternal Uncle     High Blood Pressure Maternal Uncle     Arthritis Maternal Grandfather     Diabetes Maternal Grandfather     Heart Disease Maternal Grandfather     High Blood Pressure Maternal Grandfather     High Cholesterol Maternal Grandfather     High Cholesterol Paternal Grandfather     High Blood Pressure Paternal Grandfather     Cancer Paternal Grandfather         esphageal    COPD Paternal Grandfather      Social History     Socioeconomic History    Marital status:      Spouse name: Not on file    Number of children: Not on file    Years of education: Not on file    Highest education level: Not on file   Occupational History    Not on file   Tobacco Use    Smoking status: Never Smoker    Smokeless tobacco: Never Used   Vaping Use    Vaping Use: Never used   Substance and Sexual Activity    Alcohol use: No    Drug use: No    Sexual activity: Yes     Partners: Male   Other Topics Concern    Not on file   Social History Narrative    Not on file Social Determinants of Health     Financial Resource Strain: Low Risk     Difficulty of Paying Living Expenses: Not hard at all   Food Insecurity: No Food Insecurity    Worried About Running Out of Food in the Last Year: Never true    Matthew of Food in the Last Year: Never true   Transportation Needs:     Lack of Transportation (Medical): Not on file    Lack of Transportation (Non-Medical): Not on file   Physical Activity:     Days of Exercise per Week: Not on file    Minutes of Exercise per Session: Not on file   Stress:     Feeling of Stress : Not on file   Social Connections:     Frequency of Communication with Friends and Family: Not on file    Frequency of Social Gatherings with Friends and Family: Not on file    Attends [employer] Services: Not on file    Active Member of [address] [place] [address] [employer] or Organizations: Not on file    Attends Club or Organization Meetings: Not on file    Marital Status: Not on file   Intimate Partner Violence:     Fear of Current or Ex-Partner: Not on file    Emotionally Abused: Not on file    Physically Abused: Not on file    Sexually Abused: Not on file   Housing Stability:     Unable to Pay for Housing in the Last Year: Not on file    Number of [place] in the Last Year: Not on file    Unstable Housing in the Last Year: Not on file     No current facility-administered medications for this encounter. Current Outpatient Medications   Medication Sig Dispense Refill    guaiFENesin-codeine (TUSSI-ORGANIDIN NR) 100-10 MG/5ML syrup Take 2.5 mLs by mouth 2 times daily as needed for Cough for up to 3 days. 20 mL 0    eletriptan (RELPAX) 40 MG tablet TAKE ONE-HALF TO ONE TABLET BY MOUTH ONCE AS NEEDED FOR MIGRAINE MAY REPEAT IN 2 HOURS IF NEEDED 6 tablet 5    SUMAtriptan (IMITREX) 100 MG tablet Take 1 tablet by mouth once as needed for Migraine (may repeat 2nd dose within 2 hours.  max 2 tabs in 24 hours.) 9 tablet 5    ondansetron (ZOFRAN ODT) 4 MG disintegrating tablet Take 1 tablet by mouth every 8 hours as needed for Nausea or Vomiting 30 tablet 1    propranolol (INDERAL LA) 60 MG extended release capsule Take 1 capsule by mouth daily 30 capsule 5    eletriptan (RELPAX) 40 MG tablet TAKE ONE-HALF TO ONE TABLET BY MOUTH ONCE AS NEEDED FOR MIGRAINE MAY REPEAT IN 2 HOURS IF NEEDED 9 tablet 5    spironolactone (ALDACTONE) 25 MG tablet Take 25 mg by mouth daily (Patient not taking: Reported on 9/27/2021)       Allergies   Allergen Reactions    Topamax [Topiramate] Other (See Comments)     Kidney stones    Cymbalta [Duloxetine Hcl] Other (See Comments)     moodiness       REVIEW OF SYSTEMS  10 systems reviewed, pertinent positives per HPI otherwise noted to be negative    PHYSICAL EXAM  /82   Pulse 87 Comment: Simultaneous filing. User may not have seen previous data. Temp 96.5 °F (35.8 °C) (Oral)   Resp 14   Ht 5' 8\" (1.727 m)   Wt 200 lb (90.7 kg)   LMP 03/14/2019   SpO2 97%   BMI 30.41 kg/m²   GENERAL APPEARANCE: Awake and alert. Cooperative. HEAD: Normocephalic. Atraumatic. EYES: EOM's grossly intact. ENT: Mucous membranes are moist.  Oropharynx is clear nonedematous nonerythematous. Uvula midline. No peritonsillar abscess appreciated. No swelling of the tongue. NECK: Supple. HEART: RRR. No murmurs. LUNGS: Respirations unlabored. CTAB. Good air exchange. Speaking comfortably in full sentences. Clear to auscultation. No wheeze or rhonchi. Pinky [place]les EXTREMITIES: No peripheral edema. Moves all extremities equally. All extremities neurovascularly intact. SKIN: Warm and dry. No acute rashes. NEUROLOGICAL: Alert and oriented. CN's 2-12 intact. No gross facial drooping. Strength 5/5, sensation intact. PSYCHIATRIC: Normal mood and affect. RADIOLOGY  XR CHEST (2 VW)   Final Result   Stable chest with no acute abnormality seen.              LABS  Labs Reviewed   COMPREHENSIVE METABOLIC PANEL W/ REFLEX TO MG FOR LOW K - Abnormal; Notable for the following components:       Result Value    Glucose 120 (*)     All other components within normal limits    Narrative:     Performed at:  [facility]) [address], [facility] [place]   Phone [phone] A THROAT    Narrative:     Performed at:  [facility]) [address], [facility] [place]   Phone [phone]   CULTURE, BETA STREP CONFIRM PLATES   CBC WITH AUTO DIFFERENTIAL    Narrative:     Performed at:  [facility]) [place] [address] [place]   Phone [phone]   TROPONIN    Narrative:     Performed at:  [address], [facility] [place]   Phone [phone]       PROCEDURES  Unless otherwise noted below, none  Procedures    MDM  MDM  Is a 78-year-old female presenting to emergency department for evaluation of esophageal discomfort as well as migraine and difficulty swallowing at times. On arrival ED patient was slight tachycardic 104, afebrile normotensive oxygen saturation 1% on room air. Physical exam is reassuring with lungs clear to auscultation with no wheeze or rhonchi appreciated. Oropharynx is clear nonedematous nonerythematous. .  Will obtain lab work including a strep test as well as two-view chest x-ray as no prior x-ray imaging has been performed over this past month. Seems like this has been an ongoing issue as she has completed 2 rounds of azithromycin without any improvement. Patient does have recent COVID-19 negative test.  Time low suspicion for influenza given lack of fevers. Patient's evaluation here was very reassuring with no leukocytosis, hemoglobin 14.5 no electrolyte maladies or kidney injuries. Troponin was undetected as well. Patient strep test was negative as well. Culture being sent at this time. Patient was given migraine cocktail with improvement of her headache.   Also plan to give the patient dose of GI cocktail to help with her esophageal discomfort and act as a p.o. challenge. Patient also given dose of liquid cough medication. We will plan discharge patient home with a prescription for cough medication. However I do believe it would be in patient's best interest to follow-up with GI for evaluation of any stomach ulcers versus esophageal abnormalities. The place referral to help patient get establish care and for outpatient contact information. Patient return emergency department if symptoms worsen or change. Patient is considered low risk for major adverse cardiac event and estimate low risk for ACS. DISPOSITION  Patient was discharged to home in good condition. CLINICAL IMPRESSION  1. Cough    2.  Dysphagia, unspecified type           Lexy Monaco PA-C  12/29/21 0017

## 2021-12-29 ENCOUNTER — TELEPHONE (OUTPATIENT)
Dept: GASTROENTEROLOGY | Age: 36
End: 2021-12-29

## 2021-12-29 LAB
EKG ATRIAL RATE: 85 BPM
EKG DIAGNOSIS: NORMAL
EKG P AXIS: 64 DEGREES
EKG P-R INTERVAL: 136 MS
EKG Q-T INTERVAL: 356 MS
EKG QRS DURATION: 82 MS
EKG QTC CALCULATION (BAZETT): 423 MS
EKG R AXIS: 73 DEGREES
EKG T AXIS: 30 DEGREES
EKG VENTRICULAR RATE: 85 BPM

## 2021-12-29 PROCEDURE — 93010 ELECTROCARDIOGRAM REPORT: CPT | Performed by: INTERNAL MEDICINE

## 2021-12-29 NOTE — TELEPHONE ENCOUNTER
Piter Ni called the office to be scheduled with Dr. Triny KELLY. She was seen at the [facility] ED yesterday and they did not consult GI and told her to call the office to be seen by Dr. Triny Avilez. I reached out to Dr. Triny Avilez and he felt that GI should have been consulted when she was in the ED. He said that if she does not feel that she can wait to see him next week, she will need to return to the ED and Dr. Ian Olivarez will need to be consulted for a possible scope. I called Piter Ni and she is not eating and feels like even soft food is getting caught in her esophagus. She feels weak and can't wait to be seen next week. I gave her the number to [facility] and if they can not see her soon, she will return to the ED.

## 2021-12-29 NOTE — ED NOTES
Patient provided with discharge instructions and any prescriptions. --Instructions, dosing, and follow-up appointments reviewed with patient/family. No further questions or needs at this time. --Vital signs and patient stable upon discharge. --IV d/c without complications. --Pt ambulated to private vehicle without difficulty.        Cady Nash RN  12/28/21 3252

## 2021-12-30 DIAGNOSIS — R13.10 DYSPHAGIA, UNSPECIFIED TYPE: Primary | ICD-10-CM

## 2021-12-30 LAB — S PYO THROAT QL CULT: NORMAL

## 2022-02-10 ENCOUNTER — HOSPITAL ENCOUNTER (EMERGENCY)
Age: 37
Discharge: HOME | End: 2022-02-10
Payer: COMMERCIAL

## 2022-02-10 ENCOUNTER — APPOINTMENT (OUTPATIENT)
Dept: CT IMAGING | Age: 37
End: 2022-02-10
Payer: COMMERCIAL

## 2022-02-10 VITALS
TEMPERATURE: 98.4 F | HEIGHT: 68 IN | OXYGEN SATURATION: 98 % | RESPIRATION RATE: 18 BRPM | WEIGHT: 200 LBS | SYSTOLIC BLOOD PRESSURE: 119 MMHG | HEART RATE: 75 BPM | DIASTOLIC BLOOD PRESSURE: 77 MMHG | BODY MASS INDEX: 30.31 KG/M2

## 2022-02-10 DIAGNOSIS — N20.1 URETERIC STONE: Primary | ICD-10-CM

## 2022-02-10 LAB
A/G RATIO: 1.8 (ref 1.1–2.2)
ALBUMIN SERPL-MCNC: 4.4 G/DL (ref 3.4–5)
ALP BLD-CCNC: 75 U/L (ref 40–129)
ALT SERPL-CCNC: 42 U/L (ref 10–40)
ANION GAP SERPL CALCULATED.3IONS-SCNC: 10 MMOL/L (ref 3–16)
AST SERPL-CCNC: 28 U/L (ref 15–37)
BASOPHILS ABSOLUTE: 0 K/UL (ref 0–0.2)
BASOPHILS RELATIVE PERCENT: 0.5 %
BILIRUB SERPL-MCNC: 0.4 MG/DL (ref 0–1)
BILIRUBIN URINE: NEGATIVE
BLOOD, URINE: ABNORMAL
BUN BLDV-MCNC: 10 MG/DL (ref 7–20)
CALCIUM SERPL-MCNC: 8.4 MG/DL (ref 8.3–10.6)
CHLORIDE BLD-SCNC: 106 MMOL/L (ref 99–110)
CLARITY: CLEAR
CO2: 21 MMOL/L (ref 21–32)
COLOR: ABNORMAL
CREAT SERPL-MCNC: 0.6 MG/DL (ref 0.6–1.1)
EOSINOPHILS ABSOLUTE: 0.2 K/UL (ref 0–0.6)
EOSINOPHILS RELATIVE PERCENT: 2.1 %
EPITHELIAL CELLS, UA: NORMAL /HPF (ref 0–5)
GFR AFRICAN AMERICAN: >60
GFR NON-AFRICAN AMERICAN: >60
GLUCOSE BLD-MCNC: 98 MG/DL (ref 70–99)
GLUCOSE URINE: NEGATIVE MG/DL
HCG QUALITATIVE: NEGATIVE
HCT VFR BLD CALC: 38.9 % (ref 36–48)
HEMOGLOBIN: 13.2 G/DL (ref 12–16)
KETONES, URINE: NEGATIVE MG/DL
LEUKOCYTE ESTERASE, URINE: NEGATIVE
LIPASE: 32 U/L (ref 13–60)
LYMPHOCYTES ABSOLUTE: 1.9 K/UL (ref 1–5.1)
LYMPHOCYTES RELATIVE PERCENT: 23.9 %
MCH RBC QN AUTO: 28.7 PG (ref 26–34)
MCHC RBC AUTO-ENTMCNC: 34 G/DL (ref 31–36)
MCV RBC AUTO: 84.4 FL (ref 80–100)
MICROSCOPIC EXAMINATION: YES
MONOCYTES ABSOLUTE: 0.8 K/UL (ref 0–1.3)
MONOCYTES RELATIVE PERCENT: 10.8 %
NEUTROPHILS ABSOLUTE: 4.9 K/UL (ref 1.7–7.7)
NEUTROPHILS RELATIVE PERCENT: 62.7 %
NITRITE, URINE: NEGATIVE
PDW BLD-RTO: 13.4 % (ref 12.4–15.4)
PH UA: 6 (ref 5–8)
PLATELET # BLD: 321 K/UL (ref 135–450)
PMV BLD AUTO: 7 FL (ref 5–10.5)
POTASSIUM SERPL-SCNC: 3.6 MMOL/L (ref 3.5–5.1)
PROTEIN UA: NEGATIVE MG/DL
RBC # BLD: 4.62 M/UL (ref 4–5.2)
RBC UA: NORMAL /HPF (ref 0–4)
SODIUM BLD-SCNC: 137 MMOL/L (ref 136–145)
SPECIFIC GRAVITY UA: <=1.005 (ref 1–1.03)
SPECIMEN STATUS: NORMAL
TOTAL PROTEIN: 6.8 G/DL (ref 6.4–8.2)
URINE REFLEX TO CULTURE: ABNORMAL
URINE TYPE: ABNORMAL
UROBILINOGEN, URINE: 0.2 E.U./DL
WBC # BLD: 7.8 K/UL (ref 4–11)
WBC UA: NORMAL /HPF (ref 0–5)

## 2022-02-10 PROCEDURE — 96376 TX/PRO/DX INJ SAME DRUG ADON: CPT

## 2022-02-10 PROCEDURE — 99285 EMERGENCY DEPT VISIT HI MDM: CPT

## 2022-02-10 PROCEDURE — 81001 URINALYSIS AUTO W/SCOPE: CPT

## 2022-02-10 PROCEDURE — 6360000002 HC RX W HCPCS: Performed by: NURSE PRACTITIONER

## 2022-02-10 PROCEDURE — 84703 CHORIONIC GONADOTROPIN ASSAY: CPT

## 2022-02-10 PROCEDURE — 96374 THER/PROPH/DIAG INJ IV PUSH: CPT

## 2022-02-10 PROCEDURE — 83690 ASSAY OF LIPASE: CPT

## 2022-02-10 PROCEDURE — 2580000003 HC RX 258: Performed by: NURSE PRACTITIONER

## 2022-02-10 PROCEDURE — 6370000000 HC RX 637 (ALT 250 FOR IP): Performed by: NURSE PRACTITIONER

## 2022-02-10 PROCEDURE — 74176 CT ABD & PELVIS W/O CONTRAST: CPT

## 2022-02-10 PROCEDURE — 96375 TX/PRO/DX INJ NEW DRUG ADDON: CPT

## 2022-02-10 PROCEDURE — 80053 COMPREHEN METABOLIC PANEL: CPT

## 2022-02-10 PROCEDURE — 85025 COMPLETE CBC W/AUTO DIFF WBC: CPT

## 2022-02-10 RX ORDER — ONDANSETRON 4 MG/1
4 TABLET, ORALLY DISINTEGRATING ORAL EVERY 8 HOURS PRN
Qty: 20 TABLET | Refills: 0 | Status: ON HOLD | OUTPATIENT
Start: 2022-02-10 | End: 2022-03-21 | Stop reason: HOSPADM

## 2022-02-10 RX ORDER — MORPHINE SULFATE 4 MG/ML
4 INJECTION, SOLUTION INTRAMUSCULAR; INTRAVENOUS ONCE
Status: COMPLETED | OUTPATIENT
Start: 2022-02-10 | End: 2022-02-10

## 2022-02-10 RX ORDER — OXYCODONE HYDROCHLORIDE AND ACETAMINOPHEN 5; 325 MG/1; MG/1
1 TABLET ORAL ONCE
Status: COMPLETED | OUTPATIENT
Start: 2022-02-10 | End: 2022-02-10

## 2022-02-10 RX ORDER — TAMSULOSIN HYDROCHLORIDE 0.4 MG/1
0.4 CAPSULE ORAL DAILY
Qty: 5 CAPSULE | Refills: 0 | Status: ON HOLD | OUTPATIENT
Start: 2022-02-10 | End: 2022-07-27

## 2022-02-10 RX ORDER — KETOROLAC TROMETHAMINE 30 MG/ML
30 INJECTION, SOLUTION INTRAMUSCULAR; INTRAVENOUS ONCE
Status: COMPLETED | OUTPATIENT
Start: 2022-02-10 | End: 2022-02-10

## 2022-02-10 RX ORDER — 0.9 % SODIUM CHLORIDE 0.9 %
1000 INTRAVENOUS SOLUTION INTRAVENOUS ONCE
Status: COMPLETED | OUTPATIENT
Start: 2022-02-10 | End: 2022-02-10

## 2022-02-10 RX ORDER — IBUPROFEN 600 MG/1
600 TABLET ORAL 3 TIMES DAILY PRN
Qty: 30 TABLET | Refills: 0 | Status: ON HOLD | OUTPATIENT
Start: 2022-02-10 | End: 2022-07-27 | Stop reason: HOSPADM

## 2022-02-10 RX ORDER — ONDANSETRON 2 MG/ML
4 INJECTION INTRAMUSCULAR; INTRAVENOUS ONCE
Status: COMPLETED | OUTPATIENT
Start: 2022-02-10 | End: 2022-02-10

## 2022-02-10 RX ORDER — OXYCODONE HYDROCHLORIDE AND ACETAMINOPHEN 5; 325 MG/1; MG/1
1 TABLET ORAL EVERY 6 HOURS PRN
Qty: 12 TABLET | Refills: 0 | Status: SHIPPED | OUTPATIENT
Start: 2022-02-10 | End: 2022-02-13

## 2022-02-10 RX ORDER — TAMSULOSIN HYDROCHLORIDE 0.4 MG/1
0.4 CAPSULE ORAL ONCE
Status: COMPLETED | OUTPATIENT
Start: 2022-02-10 | End: 2022-02-10

## 2022-02-10 RX ADMIN — MORPHINE SULFATE 4 MG: 4 INJECTION INTRAVENOUS at 18:22

## 2022-02-10 RX ADMIN — TAMSULOSIN HYDROCHLORIDE 0.4 MG: 0.4 CAPSULE ORAL at 21:33

## 2022-02-10 RX ADMIN — KETOROLAC TROMETHAMINE 30 MG: 30 INJECTION, SOLUTION INTRAMUSCULAR at 18:22

## 2022-02-10 RX ADMIN — SODIUM CHLORIDE 1000 ML: 9 INJECTION, SOLUTION INTRAVENOUS at 18:26

## 2022-02-10 RX ADMIN — OXYCODONE AND ACETAMINOPHEN 1 TABLET: 5; 325 TABLET ORAL at 21:33

## 2022-02-10 RX ADMIN — MORPHINE SULFATE 4 MG: 4 INJECTION, SOLUTION INTRAMUSCULAR; INTRAVENOUS at 19:39

## 2022-02-10 RX ADMIN — ONDANSETRON 4 MG: 2 INJECTION INTRAMUSCULAR; INTRAVENOUS at 18:21

## 2022-02-10 ASSESSMENT — PAIN SCALES - GENERAL
PAINLEVEL_OUTOF10: 8
PAINLEVEL_OUTOF10: 8
PAINLEVEL_OUTOF10: 10
PAINLEVEL_OUTOF10: 7
PAINLEVEL_OUTOF10: 5
PAINLEVEL_OUTOF10: 9

## 2022-02-10 NOTE — ED PROVIDER NOTES
[facility] Emergency Department    CHIEF COMPLAINT  Flank Pain (left side hx of kidney stones, received 50mcg fentanyl given in route.)      HISTORY OF PRESENT ILLNESS  Sue Hamilton is a 43 y.o. female with a pertinent history of kidney stones who presents to the ED complaining of sudden onset of left flank pain that radiates to her abdomen and groin started 2:00 this afternoon. Patient reports that she has a known 10 mm kidney stone in her left kidney that \"urology left there. \"  Patient had a surgical procedure last week they removed one of her 2 kidney stones no stent was placed. Patient reports they gave her Toradol for pain and it is not helping. Patient came in by EMS she did receive fentanyl prior to arrival with no relief of her pain. Patient reports pain is a 10 out of 10. Patient also reports some associated nausea denies emesis, diarrhea, constipation. Patient denies urinary frequency, urgency, hematuria, urinary retention, vaginal discharge, odor, irritation. Patient does report that urinating makes her left flank pain worse. No other complaints, modifying factors or associated symptoms. Nursing notes reviewed. Past Medical History:   Diagnosis Date    Anxiety     Depression     Ectopic pregnancy 2013    Endometriosis     IBS (irritable bowel syndrome)     Infertility, female     Has seen Dr. Corbett Bumpers in past multiple IUI's natural conception this pregnancy.  Interstitial cystitis     Kidney stones     x2    Migraines     Obesity (BMI 30.0-34. 9)     PCOS (polycystic ovarian syndrome)     Prolonged emergence from general anesthesia     Stomach ulcer     Urinary incontinence      Past Surgical History:   Procedure Laterality Date    BLADDER SURGERY      CYSTOSCOPY      lithotripsy for stone extraction    ECTOPIC PREGNANCY SURGERY  12/21/13    ectopic pregnancy excision laparoscopic left linear salpingostomy    HYSTERECTOMY ABDOMINAL Bilateral 3/18/2019    ROBOTIC ASSISTED TOTAL LAPAROSCOPIC HYSTERECTOMY, BILATERAL SALPINGECTION   performed by Robert Wagner [facility] at [facility]  10/05/2017     DxLS, Evacuation of ectopic pregnancy-fimbriated portion of tube    TONSILLECTOMY      WISDOM TOOTH EXTRACTION       Family History   Problem Relation Age of Onset    Cancer Maternal Grandmother     Hypertension Maternal Grandmother     Elevated Lipids Maternal Grandmother     Cancer Paternal Grandmother     High Cholesterol Paternal Grandmother     Hypertension Paternal Grandmother     Elevated Lipids Paternal Grandmother     Breast Cancer Paternal Grandmother     Parkinsonism Mother     Depression Mother     Migraines Father     Arthritis Father     Arthritis Maternal Uncle     Diabetes Maternal Uncle     High Blood Pressure Maternal Uncle     Arthritis Maternal Grandfather     Diabetes Maternal Grandfather     Heart Disease Maternal Grandfather     High Blood Pressure Maternal Grandfather     High Cholesterol Maternal Grandfather     High Cholesterol Paternal Grandfather     High Blood Pressure Paternal Grandfather     Cancer Paternal Grandfather         esphageal    COPD Paternal Grandfather      Social History     Socioeconomic History    Marital status:      Spouse name: Not on file    Number of children: Not on file    Years of education: Not on file    Highest education level: Not on file   Occupational History    Not on file   Tobacco Use    Smoking status: Never Smoker    Smokeless tobacco: Never Used   Vaping Use    Vaping Use: Never used   Substance and Sexual Activity    Alcohol use: No    Drug use: No    Sexual activity: Yes     Partners: Male   Other Topics Concern    Not on file   Social History Narrative    Not on file     Social Determinants of Health     Financial Resource Strain: Low Risk     Difficulty of Paying Living Expenses: Not hard at all   Food Insecurity: No Food Insecurity    Worried About Running Out of Food in the Last Year: Never true    Ran Out of Food in the Last Year: Never true   Transportation Needs:     Lack of Transportation (Medical): Not on file    Lack of Transportation (Non-Medical): Not on file   Physical Activity:     Days of Exercise per Week: Not on file    Minutes of Exercise per Session: Not on file   Stress:     Feeling of Stress : Not on file   Social Connections:     Frequency of Communication with Friends and Family: Not on file    Frequency of Social Gatherings with Friends and Family: Not on file    Attends Sikh Services: Not on file    Active Member of [address] [place] [address] [employer] or Organizations: Not on file    Attends Club or Organization Meetings: Not on file    Marital Status: Not on file   Intimate Partner Violence:     Fear of Current or Ex-Partner: Not on file    Emotionally Abused: Not on file    Physically Abused: Not on file    Sexually Abused: Not on file   Housing Stability:     Unable to Pay for Housing in the Last Year: Not on file    Number of [place] in the Last Year: Not on file    Unstable Housing in the Last Year: Not on file     No current facility-administered medications for this encounter. Current Outpatient Medications   Medication Sig Dispense Refill    oxyCODONE-acetaminophen (PERCOCET) 5-325 MG per tablet Take 1 tablet by mouth every 6 hours as needed for Pain for up to 3 days. Intended supply: 3 days.  Take lowest dose possible to manage pain 12 tablet 0    ondansetron (ZOFRAN ODT) 4 MG disintegrating tablet Take 1 tablet by mouth every 8 hours as needed for Nausea 20 tablet 0    tamsulosin (FLOMAX) 0.4 MG capsule Take 1 capsule by mouth daily for 5 doses 5 capsule 0    ibuprofen (ADVIL;MOTRIN) 600 MG tablet Take 1 tablet by mouth 3 times daily as needed for Pain 30 tablet 0    eletriptan (RELPAX) 40 MG tablet TAKE ONE-HALF TO ONE TABLET BY MOUTH ONCE AS NEEDED FOR MIGRAINE MAY REPEAT IN 2 HOURS IF NEEDED 6 tablet 5    SUMAtriptan (IMITREX) 100 MG tablet Take 1 tablet by mouth once as needed for Migraine (may repeat 2nd dose within 2 hours. max 2 tabs in 24 hours.) 9 tablet 5    ondansetron (ZOFRAN ODT) 4 MG disintegrating tablet Take 1 tablet by mouth every 8 hours as needed for Nausea or Vomiting 30 tablet 1    propranolol (INDERAL LA) 60 MG extended release capsule Take 1 capsule by mouth daily 30 capsule 5    eletriptan (RELPAX) 40 MG tablet TAKE ONE-HALF TO ONE TABLET BY MOUTH ONCE AS NEEDED FOR MIGRAINE MAY REPEAT IN 2 HOURS IF NEEDED 9 tablet 5    spironolactone (ALDACTONE) 25 MG tablet Take 25 mg by mouth daily (Patient not taking: Reported on 9/27/2021)       Allergies   Allergen Reactions    Topamax [Topiramate] Other (See Comments)     Kidney stones    Cymbalta [Duloxetine Hcl] Other (See Comments)     moodiness       REVIEW OF SYSTEMS  10 systems reviewed, pertinent positives per HPI otherwise noted to be negative    PHYSICAL EXAM  /77   Pulse 75   Temp 98.4 °F (36.9 °C) (Oral)   Resp 18   Ht 5' 8\" (1.727 m)   Wt 200 lb (90.7 kg)   LMP 03/14/2019   SpO2 98%   BMI 30.41 kg/m²   GENERAL APPEARANCE: Awake and alert. Cooperative. No acute distress. Vital signs are stable. Well appearing and non toxic. HEAD: Normocephalic. Atraumatic. EYES: PERRL. EOM's grossly intact. ENT: Mucous membranes are moist.   NECK: Supple. Normal ROM. HEART: RRR. Distal pulses are equal and intact. Cap refill less than 2 seconds. LUNGS: Respirations unlabored. CTAB. Good air exchange. Speaking comfortably in full sentences. No wheezing, rhonchi, rales, stridor. ABDOMEN: Soft. Non-distended. Non-tender. No guarding or rebound. No rigidity. Bowel sounds are present. Negative sheehan's. Negative McBurney's point. Left CVA tenderness. EXTREMITIES: No peripheral edema. Moves all extremities equally. All extremities neurovascularly intact. SKIN: Warm and dry. No acute rashes. NEUROLOGICAL: Alert and oriented. No gross facial drooping. Strength 5/5, sensation intact. PSYCHIATRIC: Normal mood and affect. SCREENINGS       RADIOLOGY  CT ABDOMEN PELVIS WO CONTRAST Additional Contrast? None    Result Date: 2/10/2022  EXAMINATION: CT OF THE ABDOMEN AND PELVIS WITHOUT CONTRAST 2/10/2022 7:20 pm TECHNIQUE: CT of the abdomen and pelvis was performed without the administration of intravenous contrast. Multiplanar reformatted images are provided for review. Dose modulation, iterative reconstruction, and/or weight based adjustment of the mA/kV was utilized to reduce the radiation dose to as low as reasonably achievable. COMPARISON: CT abdomen pelvis 02/09/2015. HISTORY: ORDERING SYSTEM PROVIDED HISTORY: left flank TECHNOLOGIST PROVIDED HISTORY: Reason for exam:->left flank Additional Contrast?->None Decision Support Exception - unselect if not a suspected or confirmed emergency medical condition->Emergency Medical Condition (MA) Is the patient pregnant?->No Reason for Exam: left flank pain Additional signs and symptoms: hematuria,trouble urinating Relevant Medical/Surgical History: hx of kidney stones FINDINGS: Lower Chest: Lung bases demonstrate no focal consolidation or pleural effusion. Organs: Evaluation of the parenchymal organs is limited due to lack of intravenous contrast. Liver: Unremarkable on this unenhanced exam. Spleen: Unremarkable on this unenhanced exam. Pancreas: No inflammatory changes appreciated on this unenhanced exam. Adrenal glands: Unremarkable on this unenhanced exam. Kidneys: Mild left hydroureteronephrosis due to 0.4 cm calculus in the distal left ureter at the ureteral vesicular junction. Inflammatory changes of the left kidney and ureter noted. Nonobstructive bilateral renal calculi. No right ureteral calculus. No right hydronephrosis. Gallbladder: Incompletely distended.  GI/Bowel: Evaluation of the bowel and mesentery is limited due to lack of enteric contrast. Visualized esophagus/stomach: Small hiatal hernia. Small bowel: No dilated small bowel. Large bowel: Scattered colonic diverticula without adjacent stranding. Appendix: Normal. Pelvis: Bladder is incompletely distended. Uterus is not visualized. Ovaries appear unremarkable by unenhanced CT. Peritoneum/Retroperitoneum: Adenopathy: Suboptimal evaluation for adenopathy due to lack of intravenous and enteric contrast. Abdominal aorta: No abdominal aortic aneurysm. Free fluid/air: No free fluid. No free air. Bones/Soft Tissues: Small fat filled umbilical hernia without inflammatory changes. Minimal degenerative changes noted in the visualized spine without spondylolisthesis. 1. Mild left hydroureteronephrosis due to 0.4 cm calculus in the distal left ureter at the ureteral vesicular junction. Inflammatory changes of the left kidney and ureter. Correlate with presentation to exclude superimposed pyelonephritis. 2. Other findings as described. CONSULTS  IP CONSULT TO UROLOGY    ED COURSE/MDM  Patient seen and evaluated. Old records reviewed. Diagnostic testing reviewed and results discussed. I have independently evaluated this patient based upon my scope of practice. Supervising physician was in the department for consultation as needed. Ankita Del Castillo presented to the ED today with above noted complaints. Blood work unremarkable no acute kidney injury, leukocytosis, anemia, electrolyte abnormality, transaminitis. Pregnancy negative. Urinalysis negative for infection or hematuria. During history and physical examination patient tearful unable to get comfortable due to pain. Patient was initially given Toradol, morphine, Zofran, sodium chloride bolus. CT of abdomen and pelvis shows mild left hydroureteronephrosis due to 0.4 cm calculus in the distal left ureter at the ureterovesicular junction. Inflammatory changes of the left kidney and ureter.   Correlate with presentation to exclude superimposed pyelonephritis. Blood work unremarkable no acute kidney injury or leukocytosis. Urinalysis negative for infection. Urology consult I spoke with Dr. Bassam Pedro who recommends Flomax and discharged home and close outpatient follow-up. Patient is agreeable with this plan of care. Patient also provided with short course of pain medication. While in ED patient received   Medications   morphine sulfate (PF) injection 4 mg (4 mg IntraVENous Given 2/10/22 1822)   ketorolac (TORADOL) injection 30 mg (30 mg IntraVENous Given 2/10/22 1822)   ondansetron (ZOFRAN) injection 4 mg (4 mg IntraVENous Given 2/10/22 1821)   0.9 % sodium chloride bolus (0 mLs IntraVENous Stopped 2/10/22 1927)   morphine sulfate (PF) injection 4 mg (4 mg IntraVENous Given 2/10/22 1939)   tamsulosin (FLOMAX) capsule 0.4 mg (0.4 mg Oral Given 2/10/22 2133)   oxyCODONE-acetaminophen (PERCOCET) 5-325 MG per tablet 1 tablet (1 tablet Oral Given 2/10/22 2133)             At this point I do not feel the patient requires further work up and it is reasonable to discharge the patient. A discussion was had with the patient and/or their surrogate regarding diagnosis, diagnostic testing results, treatment/ plan of care, and follow up. There was shared decision-making between myself as well as the patient and/or their surrogate and we are all in agreement with discharge home. There was an opportunity for questions and all questions were answered to the best of my ability and to the satisfaction of the patient and/or patient family. Patient will follow up with urology for further evaluation/treatment. The patient was given strict return precautions as we discussed symptoms that would necessitate return to the ED. Patient will return to ED for new/worsening symptoms. The patient verbalized their understanding and agreement with the above plan. Please refer to AVS for further details regarding discharge instructions.       Results for orders placed or performed during the hospital encounter of 02/10/22   CBC Auto Differential   Result Value Ref Range    WBC 7.8 4.0 - 11.0 K/uL    RBC 4.62 4.00 - 5.20 M/uL    Hemoglobin 13.2 12.0 - 16.0 g/dL    Hematocrit 38.9 36.0 - 48.0 %    MCV 84.4 80.0 - 100.0 fL    MCH 28.7 26.0 - 34.0 pg    MCHC 34.0 31.0 - 36.0 g/dL    RDW 13.4 12.4 - 15.4 %    Platelets 404 641 - 071 K/uL    MPV 7.0 5.0 - 10.5 fL    Neutrophils % 62.7 %    Lymphocytes % 23.9 %    Monocytes % 10.8 %    Eosinophils % 2.1 %    Basophils % 0.5 %    Neutrophils Absolute 4.9 1.7 - 7.7 K/uL    Lymphocytes Absolute 1.9 1.0 - 5.1 K/uL    Monocytes Absolute 0.8 0.0 - 1.3 K/uL    Eosinophils Absolute 0.2 0.0 - 0.6 K/uL    Basophils Absolute 0.0 0.0 - 0.2 K/uL   Comprehensive Metabolic Panel   Result Value Ref Range    Sodium 137 136 - 145 mmol/L    Potassium 3.6 3.5 - 5.1 mmol/L    Chloride 106 99 - 110 mmol/L    CO2 21 21 - 32 mmol/L    Anion Gap 10 3 - 16    Glucose 98 70 - 99 mg/dL    BUN 10 7 - 20 mg/dL    CREATININE 0.6 0.6 - 1.1 mg/dL    GFR Non-African American >60 >60    GFR African American >60 >60    Calcium 8.4 8.3 - 10.6 mg/dL    Total Protein 6.8 6.4 - 8.2 g/dL    Albumin 4.4 3.4 - 5.0 g/dL    Albumin/Globulin Ratio 1.8 1.1 - 2.2    Total Bilirubin 0.4 0.0 - 1.0 mg/dL    Alkaline Phosphatase 75 40 - 129 U/L    ALT 42 (H) 10 - 40 U/L    AST 28 15 - 37 U/L   HCG Qualitative, Serum   Result Value Ref Range    hCG Qual Negative Detects HCG level >10 MIU/mL   Lipase   Result Value Ref Range    Lipase 32.0 13.0 - 60.0 U/L   Urinalysis Reflex to Culture    Specimen: Urine, clean catch   Result Value Ref Range    Color, UA Straw Straw/Yellow    Clarity, UA Clear Clear    Glucose, Ur Negative Negative mg/dL    Bilirubin Urine Negative Negative    Ketones, Urine Negative Negative mg/dL    Specific Gravity, UA <=1.005 1.005 - 1.030    Blood, Urine TRACE-INTACT (A) Negative    pH, UA 6.0 5.0 - 8.0    Protein, UA Negative Negative mg/dL Urobilinogen, Urine 0.2 <2.0 E.U./dL    Nitrite, Urine Negative Negative    Leukocyte Esterase, Urine Negative Negative    Microscopic Examination YES     Urine Type NotGiven     Urine Reflex to Culture Not Indicated    Sample possible blood bank testing   Result Value Ref Range    Specimen Status RHYS    Microscopic Urinalysis   Result Value Ref Range    WBC, UA None seen 0 - 5 /HPF    RBC, UA 0-2 0 - 4 /HPF    Epithelial Cells, UA 0-1 0 - 5 /HPF       I estimate there is LOW risk for ACUTE APPENDICITIS, BOWEL OBSTRUCTION, CHOLECYSTITIS, DIVERTICULITIS, INCARCERATED HERNIA, PANCREATITIS, PELVIC INFLAMMATORY DISEASE, PERFORATED BOWEL or ULCER, PREGNANCY, or TUBO-OVARIAN ABSCESS, thus I consider the discharge disposition reasonable. Also, there is no evidence or peritonitis, sepsis, or toxicity. Susan Prudent and I have discussed the diagnosis and risks, and we agree with discharging home to follow-up with their primary doctor. We also discussed returning to the Emergency Department immediately if new or worsening symptoms occur. We have discussed the symptoms which are most concerning (e.g., bloody stool, fever, changing or worsening pain, vomiting) that necessitate immediate return. Final Impression    1. Ureteric stone        Blood pressure 119/77, pulse 75, temperature 98.4 °F (36.9 °C), temperature source Oral, resp. rate 18, height 5' 8\" (1.727 m), weight 200 lb (90.7 kg), last menstrual period 03/14/2019, SpO2 98 %, unknown if currently breastfeeding.mdm    Patient was sent home with a prescription for below medication/s. I did [place] patient on appropriate use of these medication. Discharge Medication List as of 2/10/2022  9:30 PM      START taking these medications    Details   oxyCODONE-acetaminophen (PERCOCET) 5-325 MG per tablet Take 1 tablet by mouth every 6 hours as needed for Pain for up to 3 days. Intended supply: 3 days.  Take lowest dose possible to manage pain, Disp-12 tablet, R-0Print !! ondansetron (ZOFRAN ODT) 4 MG disintegrating tablet Take 1 tablet by mouth every 8 hours as needed for Nausea, Disp-20 tablet, R-0Print      tamsulosin (FLOMAX) 0.4 MG capsule Take 1 capsule by mouth daily for 5 doses, Disp-5 capsule, R-0Print      ibuprofen (ADVIL;MOTRIN) 600 MG tablet Take 1 tablet by mouth 3 times daily as needed for Pain, Disp-30 tablet, R-0Print       !! - Potential duplicate medications found. Please discuss with provider. FOLLOW UP  [facility]  [address] [facility]  3rd [address]  [address] [facility] [address]  [facility]  [address] [facility] [address] [phone]          DISPOSITION  Patient was discharged to home in good condition. Comment: Please note this report has been produced using speech recognition software and may contain errors related to that system including errors in grammar, punctuation, and spelling, as well as words and phrases that may be inappropriate. If there are any questions or concerns please feel free to contact the dictating provider for clarification.             PERLA Kaiser - CHIQUI  02/11/22 6251

## 2022-02-10 NOTE — ED NOTES
Bed: 26  Expected date:   Expected time:   Means of arrival:   Comments:   [address] [place] [address] [id number] Dayron Dejesus, [facility]  02/10/22 [id number]

## 2022-02-11 NOTE — ED NOTES
Placed call to Urology @ 2042  Repaged @   RE: 0.4 cm calculus in the distal left ureter per CHEPE Thompson Dr. called back @ 2121       Refugio Daly  02/10/22 2127

## 2022-03-19 ENCOUNTER — HOSPITAL ENCOUNTER (INPATIENT)
Age: 37
LOS: 2 days | Discharge: HOME | DRG: 694 | End: 2022-03-21
Attending: EMERGENCY MEDICINE | Admitting: HOSPITALIST
Payer: COMMERCIAL

## 2022-03-19 ENCOUNTER — APPOINTMENT (OUTPATIENT)
Dept: CT IMAGING | Age: 37
DRG: 694 | End: 2022-03-19
Payer: COMMERCIAL

## 2022-03-19 DIAGNOSIS — N23 URETERAL COLIC: ICD-10-CM

## 2022-03-19 DIAGNOSIS — N20.1 URETEROLITHIASIS: Primary | ICD-10-CM

## 2022-03-19 DIAGNOSIS — R10.9 LEFT FLANK PAIN: ICD-10-CM

## 2022-03-19 PROBLEM — N13.5 URETEROVESICAL JUNCTION (UVJ) OBSTRUCTION: Status: ACTIVE | Noted: 2022-03-19

## 2022-03-19 PROBLEM — N13.5 ACQUIRED URETEROVESICAL JUNCTION (UVJ) OBSTRUCTION: Status: ACTIVE | Noted: 2022-03-19

## 2022-03-19 PROBLEM — N13.30 HYDROURETERONEPHROSIS: Status: ACTIVE | Noted: 2022-03-19

## 2022-03-19 LAB
A/G RATIO: 2 (ref 1.1–2.2)
ALBUMIN SERPL-MCNC: 4.7 G/DL (ref 3.4–5)
ALP BLD-CCNC: 85 U/L (ref 40–129)
ALT SERPL-CCNC: 29 U/L (ref 10–40)
AMORPHOUS: ABNORMAL /HPF
ANION GAP SERPL CALCULATED.3IONS-SCNC: 10 MMOL/L (ref 3–16)
AST SERPL-CCNC: 25 U/L (ref 15–37)
BACTERIA: ABNORMAL /HPF
BASOPHILS ABSOLUTE: 0.1 K/UL (ref 0–0.2)
BASOPHILS RELATIVE PERCENT: 0.4 %
BILIRUB SERPL-MCNC: 0.4 MG/DL (ref 0–1)
BILIRUBIN URINE: NEGATIVE
BLOOD, URINE: ABNORMAL
BUN BLDV-MCNC: 14 MG/DL (ref 7–20)
CALCIUM SERPL-MCNC: 8.7 MG/DL (ref 8.3–10.6)
CHLORIDE BLD-SCNC: 103 MMOL/L (ref 99–110)
CLARITY: ABNORMAL
CO2: 24 MMOL/L (ref 21–32)
COLOR: YELLOW
CREAT SERPL-MCNC: 0.9 MG/DL (ref 0.6–1.1)
EOSINOPHILS ABSOLUTE: 0.2 K/UL (ref 0–0.6)
EOSINOPHILS RELATIVE PERCENT: 1.7 %
EPITHELIAL CELLS, UA: ABNORMAL /HPF (ref 0–5)
GFR AFRICAN AMERICAN: >60
GFR NON-AFRICAN AMERICAN: >60
GLUCOSE BLD-MCNC: 122 MG/DL (ref 70–99)
GLUCOSE URINE: NEGATIVE MG/DL
HCT VFR BLD CALC: 39.9 % (ref 36–48)
HEMOGLOBIN: 13.5 G/DL (ref 12–16)
KETONES, URINE: NEGATIVE MG/DL
LEUKOCYTE ESTERASE, URINE: NEGATIVE
LYMPHOCYTES ABSOLUTE: 2.1 K/UL (ref 1–5.1)
LYMPHOCYTES RELATIVE PERCENT: 17.1 %
MCH RBC QN AUTO: 28.3 PG (ref 26–34)
MCHC RBC AUTO-ENTMCNC: 34 G/DL (ref 31–36)
MCV RBC AUTO: 83.4 FL (ref 80–100)
MICROSCOPIC EXAMINATION: YES
MONOCYTES ABSOLUTE: 1.1 K/UL (ref 0–1.3)
MONOCYTES RELATIVE PERCENT: 8.6 %
NEUTROPHILS ABSOLUTE: 8.9 K/UL (ref 1.7–7.7)
NEUTROPHILS RELATIVE PERCENT: 72.2 %
NITRITE, URINE: NEGATIVE
PDW BLD-RTO: 12.7 % (ref 12.4–15.4)
PH UA: 8 (ref 5–8)
PLATELET # BLD: 321 K/UL (ref 135–450)
PMV BLD AUTO: 7.4 FL (ref 5–10.5)
POTASSIUM SERPL-SCNC: 3.8 MMOL/L (ref 3.5–5.1)
PROTEIN UA: NEGATIVE MG/DL
RBC # BLD: 4.78 M/UL (ref 4–5.2)
RBC UA: ABNORMAL /HPF (ref 0–4)
SODIUM BLD-SCNC: 137 MMOL/L (ref 136–145)
SPECIFIC GRAVITY UA: 1.01 (ref 1–1.03)
TOTAL PROTEIN: 7.1 G/DL (ref 6.4–8.2)
URINE REFLEX TO CULTURE: ABNORMAL
URINE TYPE: ABNORMAL
UROBILINOGEN, URINE: 0.2 E.U./DL
WBC # BLD: 12.3 K/UL (ref 4–11)
WBC UA: ABNORMAL /HPF (ref 0–5)

## 2022-03-19 PROCEDURE — 96375 TX/PRO/DX INJ NEW DRUG ADDON: CPT

## 2022-03-19 PROCEDURE — 96361 HYDRATE IV INFUSION ADD-ON: CPT

## 2022-03-19 PROCEDURE — 6360000002 HC RX W HCPCS: Performed by: NURSE PRACTITIONER

## 2022-03-19 PROCEDURE — 6370000000 HC RX 637 (ALT 250 FOR IP): Performed by: HOSPITALIST

## 2022-03-19 PROCEDURE — 1200000000 HC SEMI PRIVATE

## 2022-03-19 PROCEDURE — 96376 TX/PRO/DX INJ SAME DRUG ADON: CPT

## 2022-03-19 PROCEDURE — 99283 EMERGENCY DEPT VISIT LOW MDM: CPT

## 2022-03-19 PROCEDURE — 2580000003 HC RX 258: Performed by: NURSE PRACTITIONER

## 2022-03-19 PROCEDURE — G0378 HOSPITAL OBSERVATION PER HR: HCPCS

## 2022-03-19 PROCEDURE — 85025 COMPLETE CBC W/AUTO DIFF WBC: CPT

## 2022-03-19 PROCEDURE — 80053 COMPREHEN METABOLIC PANEL: CPT

## 2022-03-19 PROCEDURE — 81001 URINALYSIS AUTO W/SCOPE: CPT

## 2022-03-19 PROCEDURE — 6360000002 HC RX W HCPCS: Performed by: HOSPITALIST

## 2022-03-19 PROCEDURE — 74176 CT ABD & PELVIS W/O CONTRAST: CPT

## 2022-03-19 PROCEDURE — 96374 THER/PROPH/DIAG INJ IV PUSH: CPT

## 2022-03-19 RX ORDER — TAMSULOSIN HYDROCHLORIDE 0.4 MG/1
0.4 CAPSULE ORAL DAILY
Status: DISCONTINUED | OUTPATIENT
Start: 2022-03-19 | End: 2022-03-21 | Stop reason: HOSPADM

## 2022-03-19 RX ORDER — ONDANSETRON 4 MG/1
4 TABLET, ORALLY DISINTEGRATING ORAL EVERY 8 HOURS PRN
Status: DISCONTINUED | OUTPATIENT
Start: 2022-03-19 | End: 2022-03-21 | Stop reason: HOSPADM

## 2022-03-19 RX ORDER — MORPHINE SULFATE 4 MG/ML
4 INJECTION, SOLUTION INTRAMUSCULAR; INTRAVENOUS EVERY 30 MIN PRN
Status: DISCONTINUED | OUTPATIENT
Start: 2022-03-19 | End: 2022-03-19

## 2022-03-19 RX ORDER — ONDANSETRON 2 MG/ML
4 INJECTION INTRAMUSCULAR; INTRAVENOUS EVERY 6 HOURS PRN
Status: DISCONTINUED | OUTPATIENT
Start: 2022-03-19 | End: 2022-03-21 | Stop reason: HOSPADM

## 2022-03-19 RX ORDER — SODIUM CHLORIDE 9 MG/ML
25 INJECTION, SOLUTION INTRAVENOUS PRN
Status: DISCONTINUED | OUTPATIENT
Start: 2022-03-19 | End: 2022-03-21 | Stop reason: HOSPADM

## 2022-03-19 RX ORDER — SODIUM CHLORIDE 9 MG/ML
INJECTION, SOLUTION INTRAVENOUS CONTINUOUS
Status: ACTIVE | OUTPATIENT
Start: 2022-03-19 | End: 2022-03-20

## 2022-03-19 RX ORDER — POTASSIUM CHLORIDE 20 MEQ/1
40 TABLET, EXTENDED RELEASE ORAL PRN
Status: DISCONTINUED | OUTPATIENT
Start: 2022-03-19 | End: 2022-03-21 | Stop reason: HOSPADM

## 2022-03-19 RX ORDER — POTASSIUM CHLORIDE 7.45 MG/ML
10 INJECTION INTRAVENOUS PRN
Status: DISCONTINUED | OUTPATIENT
Start: 2022-03-19 | End: 2022-03-21 | Stop reason: HOSPADM

## 2022-03-19 RX ORDER — ACETAMINOPHEN 500 MG
1000 TABLET ORAL EVERY 6 HOURS PRN
Status: DISCONTINUED | OUTPATIENT
Start: 2022-03-19 | End: 2022-03-21 | Stop reason: HOSPADM

## 2022-03-19 RX ORDER — SODIUM CHLORIDE 0.9 % (FLUSH) 0.9 %
10 SYRINGE (ML) INJECTION PRN
Status: DISCONTINUED | OUTPATIENT
Start: 2022-03-19 | End: 2022-03-21 | Stop reason: HOSPADM

## 2022-03-19 RX ORDER — KETOROLAC TROMETHAMINE 30 MG/ML
30 INJECTION, SOLUTION INTRAMUSCULAR; INTRAVENOUS EVERY 6 HOURS PRN
Status: DISPENSED | OUTPATIENT
Start: 2022-03-19 | End: 2022-03-20

## 2022-03-19 RX ORDER — ACETAMINOPHEN 650 MG/1
650 SUPPOSITORY RECTAL EVERY 6 HOURS PRN
Status: DISCONTINUED | OUTPATIENT
Start: 2022-03-19 | End: 2022-03-21 | Stop reason: HOSPADM

## 2022-03-19 RX ORDER — PROPRANOLOL HCL 60 MG
60 CAPSULE, EXTENDED RELEASE 24HR ORAL DAILY
Status: DISCONTINUED | OUTPATIENT
Start: 2022-03-20 | End: 2022-03-21 | Stop reason: HOSPADM

## 2022-03-19 RX ORDER — SODIUM CHLORIDE 0.9 % (FLUSH) 0.9 %
10 SYRINGE (ML) INJECTION EVERY 12 HOURS SCHEDULED
Status: DISCONTINUED | OUTPATIENT
Start: 2022-03-19 | End: 2022-03-21 | Stop reason: HOSPADM

## 2022-03-19 RX ORDER — MORPHINE SULFATE 2 MG/ML
2 INJECTION, SOLUTION INTRAMUSCULAR; INTRAVENOUS EVERY 4 HOURS PRN
Status: DISCONTINUED | OUTPATIENT
Start: 2022-03-19 | End: 2022-03-21 | Stop reason: HOSPADM

## 2022-03-19 RX ORDER — 0.9 % SODIUM CHLORIDE 0.9 %
1000 INTRAVENOUS SOLUTION INTRAVENOUS ONCE
Status: COMPLETED | OUTPATIENT
Start: 2022-03-19 | End: 2022-03-19

## 2022-03-19 RX ORDER — MAGNESIUM SULFATE IN WATER 40 MG/ML
2000 INJECTION, SOLUTION INTRAVENOUS PRN
Status: DISCONTINUED | OUTPATIENT
Start: 2022-03-19 | End: 2022-03-21 | Stop reason: HOSPADM

## 2022-03-19 RX ORDER — SODIUM CHLORIDE 9 MG/ML
INJECTION, SOLUTION INTRAVENOUS CONTINUOUS
Status: DISCONTINUED | OUTPATIENT
Start: 2022-03-19 | End: 2022-03-21 | Stop reason: HOSPADM

## 2022-03-19 RX ORDER — MORPHINE SULFATE 4 MG/ML
4 INJECTION, SOLUTION INTRAMUSCULAR; INTRAVENOUS EVERY 4 HOURS PRN
Status: DISCONTINUED | OUTPATIENT
Start: 2022-03-19 | End: 2022-03-21 | Stop reason: HOSPADM

## 2022-03-19 RX ORDER — ACETAMINOPHEN 325 MG/1
650 TABLET ORAL EVERY 6 HOURS PRN
Status: DISCONTINUED | OUTPATIENT
Start: 2022-03-19 | End: 2022-03-21 | Stop reason: HOSPADM

## 2022-03-19 RX ORDER — ONDANSETRON 2 MG/ML
4 INJECTION INTRAMUSCULAR; INTRAVENOUS EVERY 30 MIN PRN
Status: COMPLETED | OUTPATIENT
Start: 2022-03-19 | End: 2022-03-19

## 2022-03-19 RX ORDER — SENNA PLUS 8.6 MG/1
1 TABLET ORAL DAILY PRN
Status: DISCONTINUED | OUTPATIENT
Start: 2022-03-19 | End: 2022-03-21 | Stop reason: HOSPADM

## 2022-03-19 RX ORDER — TOPIRAMATE 25 MG/1
50 TABLET ORAL DAILY
Status: ON HOLD | COMMUNITY
End: 2022-07-27

## 2022-03-19 RX ADMIN — KETOROLAC TROMETHAMINE 30 MG: 30 INJECTION, SOLUTION INTRAMUSCULAR; INTRAVENOUS at 18:20

## 2022-03-19 RX ADMIN — SODIUM CHLORIDE: 9 INJECTION, SOLUTION INTRAVENOUS at 17:34

## 2022-03-19 RX ADMIN — MORPHINE SULFATE 4 MG: 4 INJECTION, SOLUTION INTRAMUSCULAR; INTRAVENOUS at 17:34

## 2022-03-19 RX ADMIN — HYDROMORPHONE HYDROCHLORIDE 1 MG: 1 INJECTION, SOLUTION INTRAMUSCULAR; INTRAVENOUS; SUBCUTANEOUS at 15:37

## 2022-03-19 RX ADMIN — ONDANSETRON 4 MG: 2 INJECTION INTRAMUSCULAR; INTRAVENOUS at 15:40

## 2022-03-19 RX ADMIN — TAMSULOSIN HYDROCHLORIDE 0.4 MG: 0.4 CAPSULE ORAL at 18:21

## 2022-03-19 RX ADMIN — SODIUM CHLORIDE 1000 ML: 9 INJECTION, SOLUTION INTRAVENOUS at 15:36

## 2022-03-19 RX ADMIN — MORPHINE SULFATE 4 MG: 4 INJECTION, SOLUTION INTRAMUSCULAR; INTRAVENOUS at 23:54

## 2022-03-19 RX ADMIN — ONDANSETRON 4 MG: 2 INJECTION INTRAMUSCULAR; INTRAVENOUS at 17:34

## 2022-03-19 ASSESSMENT — PAIN - FUNCTIONAL ASSESSMENT: PAIN_FUNCTIONAL_ASSESSMENT: 0-10

## 2022-03-19 ASSESSMENT — PAIN SCALES - GENERAL
PAINLEVEL_OUTOF10: 9
PAINLEVEL_OUTOF10: 8
PAINLEVEL_OUTOF10: 7
PAINLEVEL_OUTOF10: 9
PAINLEVEL_OUTOF10: 10

## 2022-03-19 ASSESSMENT — PAIN DESCRIPTION - FREQUENCY: FREQUENCY: CONTINUOUS

## 2022-03-19 ASSESSMENT — PAIN DESCRIPTION - ORIENTATION: ORIENTATION: LEFT

## 2022-03-19 ASSESSMENT — PAIN DESCRIPTION - PAIN TYPE: TYPE: ACUTE PAIN

## 2022-03-19 ASSESSMENT — PAIN DESCRIPTION - LOCATION: LOCATION: FLANK

## 2022-03-19 NOTE — H&P
HOSPITALISTS HISTORY AND PHYSICAL    3/19/2022 5:21 PM    Patient Information:  Chari Ang is a 43 y.o. female [id number]  PCP:  [address] [place] [address] (Tel: [phone] )    Chief complaint:    Chief Complaint   Patient presents with    Flank Pain     patient with left flank pain that started yesterday. Patient following with Urology due to chronic kidney stones. Last removed January 2022. Patient medicating with pain meds and flomax at home with no relief. History of Present Illness:  Lilian Chiu is a 43 y.o. female who presented to the ED to be evaluated for a 2-day history of left-sided flank pain and dysuria consistent with previous episodes of kidney stones. Patient reports that she has required urologic intervention in the past, with the most recent surgery occurring in January 2022. Patient reports that her home doses of Zofran and Vicodin have not effectively decreased her pain. Patient states that she does not drink dark sodas or tea. Upon arrival to the ED CT abdomen pelvis was obtained revealing a 5 x 4 mm obstructing calculus at left UVJ with associated moderate hydronephrosis. Labs were obtained and notable for mild leukocytosis but otherwise unremarkable. Patient received IVF NS bolus in addition to a dosage of IV Dilaudid and morphine prior to request for admission. Consult placed to urologist Dr. Barbara Dallas request the patient be n.p.o. after midnight for possible cystoscopy and stone retrieval.    History obtained from patient and review of Epic chart    REVIEW OF SYSTEMS:   Constitutional: Negative for fever,chills, and generalized weakness  ENT: Negative for headache, rhinorrhea, and sore throat.   Respiratory: Negative for shortness of breath, wheezing, and cough  Cardiovascular: Negative for chest pain, palpitations, peripheral edema, orthopnea or PND  Gastrointestinal: Positive for N/V abdominal pain; no hematemesis, hematochezia, or melena; no anorexia  Genitourinary: Positive dysuria, frequency; denies retention; no incontinence  Hematologic/Lymphatic: Negative for bleeding tendency/excessive bruising  Musculoskeletal: Positive for myalgias and arthalgias; able to ambulate without difficulty  Neurologic: Negative for LOC, seizure activity, paresthesias, dysarthria, vertigo, and gait disturbance  Skin: Negative for itching,rash, decubitus  Psychiatric: Negative for depression,anxiety, and agitation; no hallucinations; denies SI/HI  Endocrine: Negative for polyuria/polydipsia/polyphagia; no heat/cold intolerance    Past Medical History:   has a past medical history of Anxiety, Depression, Ectopic pregnancy, Endometriosis, IBS (irritable bowel syndrome), Infertility, female, Interstitial cystitis, Kidney stones, Migraines, Obesity (BMI 30.0-34.9), PCOS (polycystic ovarian syndrome), Prolonged emergence from general anesthesia, Stomach ulcer, and Urinary incontinence. Past Surgical History:   has a past surgical history that includes Bladder surgery; Tonsillectomy; Ectopic pregnancy surgery (12/21/13); [place] tooth extraction; other surgical history (10/05/2017); Cystoscopy; HYSTERECTOMY ABDOMINAL (Bilateral, 3/18/2019); and Lithotripsy. Medications:  No current facility-administered medications on file prior to encounter.      Current Outpatient Medications on File Prior to Encounter   Medication Sig Dispense Refill    ondansetron (ZOFRAN ODT) 4 MG disintegrating tablet Take 1 tablet by mouth every 8 hours as needed for Nausea 20 tablet 0    tamsulosin (FLOMAX) 0.4 MG capsule Take 1 capsule by mouth daily for 5 doses 5 capsule 0    ibuprofen (ADVIL;MOTRIN) 600 MG tablet Take 1 tablet by mouth 3 times daily as needed for Pain 30 tablet 0    eletriptan (RELPAX) 40 MG tablet TAKE ONE-HALF TO ONE TABLET BY MOUTH ONCE AS NEEDED FOR MIGRAINE MAY REPEAT IN 2 HOURS IF NEEDED 6 tablet 5    SUMAtriptan (IMITREX) 100 MG tablet Take 1 tablet by mouth once as needed for Migraine (may repeat 2nd dose within 2 hours. max 2 tabs in 24 hours.) 9 tablet 5    ondansetron (ZOFRAN ODT) 4 MG disintegrating tablet Take 1 tablet by mouth every 8 hours as needed for Nausea or Vomiting 30 tablet 1    propranolol (INDERAL LA) 60 MG extended release capsule Take 1 capsule by mouth daily 30 capsule 5    eletriptan (RELPAX) 40 MG tablet TAKE ONE-HALF TO ONE TABLET BY MOUTH ONCE AS NEEDED FOR MIGRAINE MAY REPEAT IN 2 HOURS IF NEEDED 9 tablet 5    spironolactone (ALDACTONE) 25 MG tablet Take 25 mg by mouth daily (Patient not taking: Reported on 9/27/2021)         Allergies: Allergies   Allergen Reactions    Fentanyl Nausea And Vomiting     She reports it made her extremely sick and did not help with pain, made her feel like she wanted to die    Topamax [Topiramate] Other (See Comments)     Kidney stones    Cymbalta [Duloxetine Hcl] Other (See Comments)     moodiness        Social History:   reports that she has never smoked. She has never used smokeless tobacco. She reports that she does not drink alcohol and does not use drugs. Family History:  family history includes Arthritis in her father, maternal grandfather, and maternal uncle; Breast Cancer in her paternal grandmother; COPD in her paternal grandfather; Cancer in her maternal grandmother, paternal grandfather, and paternal grandmother; Depression in her mother; Diabetes in her maternal grandfather and maternal uncle; Elevated Lipids in her maternal grandmother and paternal grandmother; Heart Disease in her maternal grandfather; High Blood Pressure in her maternal grandfather, maternal uncle, and paternal grandfather; High Cholesterol in her maternal grandfather, paternal grandfather, and paternal grandmother; Hypertension in her maternal grandmother and paternal grandmother; Migraines in her father; Parkinsonism in her mother.      Physical Exam:  /84   Pulse 105   Temp 98.1 °F (36.7 °C) (Oral)   Resp 18   Ht 5' 8\" (1.727 m)   Wt 200 lb (90.7 kg)   LMP 03/14/2019   SpO2 99%   BMI 30.41 kg/m²     General appearance: Middle-age female who appears to be in moderate discomfort  Eyes: Sclera clear without conjunctival injection; PERRLA; EOMI  ENT: Mucous membranes moist without thrush; normal dentition  Neck: Supple without meningismus; no goiter; no carotid bruit bilaterally  Cardiovascular: Regular rhythm without ectopy; normal S1-S2 with no murmurs; no peripheral edema; no JVD  Respiratory: No tachypnea; CTAB with adequate air exchange, no wheeze, rhonchi or rales; I:E intact  Gastrointestinal: Abdomen soft, non-tender, not distended; bowel sounds normal; no masses/organomegaly appreciated  Musculoskeletal: FROM spine and extremities x4; no gross deformity  Neurology: A&O x3; cranial nerves 2-12 grossly intact; motor 5/5  BUE/BLE; no seizure activity; finger-to-nose/heel-to-shin intact; no pronator drift  Psychiatry: Well-groomed with good eye contact; appropriate affect; no visual/auditory hallucination  Skin: Warm, dry, normal turgor, no rash  PV: 2/4 radial and dorsalis pedis bilaterally; brisk capillary refill    Labs:  CBC:   Lab Results   Component Value Date    WBC 12.3 03/19/2022    RBC 4.78 03/19/2022    HGB 13.5 03/19/2022    HCT 39.9 03/19/2022    MCV 83.4 03/19/2022    MCH 28.3 03/19/2022    MCHC 34.0 03/19/2022    RDW 12.7 03/19/2022     03/19/2022    MPV 7.4 03/19/2022     BMP:    Lab Results   Component Value Date     03/19/2022    K 3.8 03/19/2022    K 3.7 12/28/2021     03/19/2022    CO2 24 03/19/2022    BUN 14 03/19/2022    CREATININE 0.9 03/19/2022    CALCIUM 8.7 03/19/2022    GFRAA >60 03/19/2022    GFRAA >60 08/19/2012    LABGLOM >60 03/19/2022    GLUCOSE 122 03/19/2022     CT ABDOMEN PELVIS WO CONTRAST Additional Contrast? None   Final Result   Obstructing 5 x 4 mm calculus at the left ureterovesicular junction with   associated moderate upstream hydroureteronephrosis. RECOMMENDATIONS:   Unavailable             I visualized CXR images and EKG strips personally and agree with documented interpretation    Discussed case  with ED provider    Problem List:  Principal Problem:    Acquired ureterovesical junction (UVJ) obstruction  Active Problems:    Nephrolithiasis    Hydroureteronephrosis    Ureterovesical junction (UVJ) obstruction  Resolved Problems:    * No resolved hospital problems. *        Consults:  IP CONSULT TO UROLOGY  IP CONSULT TO HOSPITALIST      Assessment/Plan:     Nephrolithiasis with UVJ obstruction/hydronephrosis  -Patient admitted to medical floor overnight with strict I's and O's; orders placed to strain all urine  -IVF resuscitation initiated in ED, with maintenance fluids to be continued overnight  -Flomax 0.4 mg daily  -Zofran PRN nausea  -IV Toradol PRN moderate pain x 8 doses  -IV morphine scheduled sparingly for severe discomfort  -Patient to remain n.p.o. after midnight  -Consultation placed to urology for possible cystoscopy in a.m. DVT prophylaxis-BLE SCDs overnight  Code status-full code  Diet-regular STEFANI now than n.p.o. after midnight  IV access-PIV established in ED      Admit as inpatien. I anticipate hospitalization spanning more than two midnights for investigation and treatment of the above medically necessary diagnoses. Comment: Please note this report has been produced using speech recognition software and may contain errors related to that system including errors in grammar, punctuation, and spelling, as well as words and phrases that may be inappropriate. If there are any questions or concerns please feel free to contact the dictating provider for clarification.          Ana Brand MD    3/19/2022 5:21 PM

## 2022-03-19 NOTE — ED PROVIDER NOTES
Evaluated by Advanced Practice Provider    [facility]  ED    CHIEF COMPLAINT  Flank Pain (patient with left flank pain that started yesterday. Patient following with Urology due to chronic kidney stones. Last removed January 2022. Patient medicating with pain meds and flomax at home with no relief.)    HISTORY OF PRESENT ILLNESS  Zoe House is a 43 y.o. female who presents to the ED complaining of left sided flank pain. Left side flank pain, started yesterday and worsened today. She has history of kidney stones and has had 2 so far this year. She had to have one removed. He wanted her to come here to be admitted for pain control and they may need to do surgery tomorrow to remove the stone. She spoke Dr. Usman Hobson today, sees Dr. Mayco Heck. She has been taking flomax, toradol and percocet. She is not able to control symptoms with oral meds at home. The patient is currently rating their pain as 10/10 and describes it as an aching type of pain. Treatments tried prior to arrival in the ED: above. The patient arrived to the ED via private car. PAST MEDICAL HISTORY    Past Medical History:   Diagnosis Date    Anxiety     Depression     Ectopic pregnancy 2013    Endometriosis     IBS (irritable bowel syndrome)     Infertility, female     Has seen Dr. Dianna Polo in past multiple IUI's natural conception this pregnancy.  Interstitial cystitis     Kidney stones     x2    Migraines     Obesity (BMI 30.0-34. 9)     PCOS (polycystic ovarian syndrome)     Prolonged emergence from general anesthesia     Stomach ulcer     Urinary incontinence        SURGICAL HISTORY    Past Surgical History:   Procedure Laterality Date    BLADDER SURGERY      CYSTOSCOPY      lithotripsy for stone extraction    ECTOPIC PREGNANCY SURGERY  12/21/13    ectopic pregnancy excision laparoscopic left linear salpingostomy    HYSTERECTOMY ABDOMINAL Bilateral 3/18/2019    ROBOTIC ASSISTED TOTAL LAPAROSCOPIC HYSTERECTOMY, BILATERAL SALPINGECTION   performed by Suyapa Garces MD at [facility]  10/05/2017     DxLS, Evacuation of ectopic pregnancy-fimbriated portion of tube    TONSILLECTOMY      WISDOM TOOTH EXTRACTION         CURRENT MEDICATIONS    Current Outpatient Rx   Medication Sig Dispense Refill    ondansetron (ZOFRAN ODT) 4 MG disintegrating tablet Take 1 tablet by mouth every 8 hours as needed for Nausea 20 tablet 0    tamsulosin (FLOMAX) 0.4 MG capsule Take 1 capsule by mouth daily for 5 doses 5 capsule 0    ibuprofen (ADVIL;MOTRIN) 600 MG tablet Take 1 tablet by mouth 3 times daily as needed for Pain 30 tablet 0    eletriptan (RELPAX) 40 MG tablet TAKE ONE-HALF TO ONE TABLET BY MOUTH ONCE AS NEEDED FOR MIGRAINE MAY REPEAT IN 2 HOURS IF NEEDED 6 tablet 5    SUMAtriptan (IMITREX) 100 MG tablet Take 1 tablet by mouth once as needed for Migraine (may repeat 2nd dose within 2 hours.  max 2 tabs in 24 hours.) 9 tablet 5    ondansetron (ZOFRAN ODT) 4 MG disintegrating tablet Take 1 tablet by mouth every 8 hours as needed for Nausea or Vomiting 30 tablet 1    propranolol (INDERAL LA) 60 MG extended release capsule Take 1 capsule by mouth daily 30 capsule 5    eletriptan (RELPAX) 40 MG tablet TAKE ONE-HALF TO ONE TABLET BY MOUTH ONCE AS NEEDED FOR MIGRAINE MAY REPEAT IN 2 HOURS IF NEEDED 9 tablet 5    spironolactone (ALDACTONE) 25 MG tablet Take 25 mg by mouth daily (Patient not taking: Reported on 9/27/2021)         ALLERGIES    Allergies   Allergen Reactions    Fentanyl Nausea And Vomiting     She reports it made her extremely sick and did not help with pain, made her feel like she wanted to die    Topamax [Topiramate] Other (See Comments)     Kidney stones    Cymbalta [Duloxetine Hcl] Other (See Comments)     moodiness       FAMILY HISTORY    Family History   Problem Relation Age of Onset    Cancer Maternal Grandmother     Hypertension Maternal Grandmother     Elevated Lipids Maternal Grandmother     Cancer Paternal Grandmother     High Cholesterol Paternal Grandmother     Hypertension Paternal Grandmother     Elevated Lipids Paternal Grandmother     Breast Cancer Paternal Grandmother     Parkinsonism Mother     Depression Mother     Migraines Father     Arthritis Father     Arthritis Maternal Uncle     Diabetes Maternal Uncle     High Blood Pressure Maternal Uncle     Arthritis Maternal Grandfather     Diabetes Maternal Grandfather     Heart Disease Maternal Grandfather     High Blood Pressure Maternal Grandfather     High Cholesterol Maternal Grandfather     High Cholesterol Paternal Grandfather     High Blood Pressure Paternal Grandfather     Cancer Paternal Grandfather         esphageal    COPD Paternal Grandfather        SOCIAL HISTORY    Social History     Socioeconomic History    Marital status:      Spouse name: Not on file    Number of children: Not on file    Years of education: Not on file    Highest education level: Not on file   Occupational History    Not on file   Tobacco Use    Smoking status: Never Smoker    Smokeless tobacco: Never Used   Vaping Use    Vaping Use: Never used   Substance and Sexual Activity    Alcohol use: No    Drug use: No    Sexual activity: Yes     Partners: Male   Other Topics Concern    Not on file   Social History Narrative    Not on file     Social Determinants of Health     Financial Resource Strain: Low Risk     Difficulty of Paying Living Expenses: Not hard at all   Food Insecurity: No Food Insecurity    Worried About Running Out of Food in the Last Year: Never true    Matthew of Food in the Last Year: Never true   Transportation Needs:     Lack of Transportation (Medical): Not on file    Lack of Transportation (Non-Medical):  Not on file   Physical Activity:     Days of Exercise per Week: Not on file    Minutes of Exercise per Session: Not on file   Stress:     Feeling of Stress : Not on file   Social Connections:     Frequency of Communication with Friends and Family: Not on file    Frequency of Social Gatherings with Friends and Family: Not on file    Attends Yarsani Services: Not on file    Active Member of Clubs or Organizations: Not on file    Attends Club or Organization Meetings: Not on file    Marital Status: Not on file   Intimate Partner Violence:     Fear of Current or Ex-Partner: Not on file    Emotionally Abused: Not on file    Physically Abused: Not on file    Sexually Abused: Not on file   Housing Stability:     Unable to Pay for Housing in the Last Year: Not on file    Number of [place] in the Last Year: Not on file    Unstable Housing in the Last Year: Not on file       REVIEW OFSYSTEMS    10systems reviewed, pertinent positives per HPI otherwise noted to be negative. PHYSICAL EXAM  Physical Exam  Vitals:    03/19/22 1445   BP: 133/84   Pulse: 105   Resp: 18   Temp: 98.1 °F (36.7 °C)   SpO2: 99%     GENERAL: Patient is well-developed, well-nourished. Awake andalert. Cooperative. Resting in bed. Moderately distressed due to pain, not toxic in appearance. HEENT:  Normocephalic, atraumatic. Conjunctivaappear normal. Sclera is non-icteric. External ears are normal.    NECK: Supple with normal ROM. Tracheamidline  LUNGS: Equal and symmetric chest rise. Breathing is unlabored. Speaking comfortably in fullsentences. Lungs are clear bilaterally to auscultation. Without wheezing, rales, or rhonchi. CADIOVASCULAR:  Regular rate and rhythm. Normal S1-S2 sounds. No murmurs, rubs, or gallops. GI: Soft, left flank tenderness to palpation, nondistended with positive bowel sounds. No rebound tenderness, guarding or rigidity. Negative Duggan's sign. Negative Rovsing's sign. No CVAT to palpation. No masses or hepatosplenomegaly to palpation. MUSCULOSKELETAL:  No gross deformities or trauma noted.  Moving all extremities equally and appropriately. Normal ROM. SKIN: Warm/dry. Skin is intact. No rashes or lesions noted. PSYCHIATRIC: Mood and affect appropriate. Speech is clear and articulate. NEUROLOGIC: Alert and oriented. No focal motor or sensory deficits.      LABS   Results for orders placed or performed during the hospital encounter of 03/19/22   CBC with Auto Differential   Result Value Ref Range    WBC 12.3 (H) 4.0 - 11.0 K/uL    RBC 4.78 4.00 - 5.20 M/uL    Hemoglobin 13.5 12.0 - 16.0 g/dL    Hematocrit 39.9 36.0 - 48.0 %    MCV 83.4 80.0 - 100.0 fL    MCH 28.3 26.0 - 34.0 pg    MCHC 34.0 31.0 - 36.0 g/dL    RDW 12.7 12.4 - 15.4 %    Platelets 560 324 - 333 K/uL    MPV 7.4 5.0 - 10.5 fL    Neutrophils % 72.2 %    Lymphocytes % 17.1 %    Monocytes % 8.6 %    Eosinophils % 1.7 %    Basophils % 0.4 %    Neutrophils Absolute 8.9 (H) 1.7 - 7.7 K/uL    Lymphocytes Absolute 2.1 1.0 - 5.1 K/uL    Monocytes Absolute 1.1 0.0 - 1.3 K/uL    Eosinophils Absolute 0.2 0.0 - 0.6 K/uL    Basophils Absolute 0.1 0.0 - 0.2 K/uL   Comprehensive Metabolic Panel   Result Value Ref Range    Sodium 137 136 - 145 mmol/L    Potassium 3.8 3.5 - 5.1 mmol/L    Chloride 103 99 - 110 mmol/L    CO2 24 21 - 32 mmol/L    Anion Gap 10 3 - 16    Glucose 122 (H) 70 - 99 mg/dL    BUN 14 7 - 20 mg/dL    CREATININE 0.9 0.6 - 1.1 mg/dL    GFR Non-African American >60 >60    GFR African American >60 >60    Calcium 8.7 8.3 - 10.6 mg/dL    Total Protein 7.1 6.4 - 8.2 g/dL    Albumin 4.7 3.4 - 5.0 g/dL    Albumin/Globulin Ratio 2.0 1.1 - 2.2    Total Bilirubin 0.4 0.0 - 1.0 mg/dL    Alkaline Phosphatase 85 40 - 129 U/L    ALT 29 10 - 40 U/L    AST 25 15 - 37 U/L   Urinalysis with Reflex to Culture    Specimen: Urine   Result Value Ref Range    Color, UA Yellow Straw/Yellow    Clarity, UA CLOUDY (A) Clear    Glucose, Ur Negative Negative mg/dL    Bilirubin Urine Negative Negative    Ketones, Urine Negative Negative mg/dL    Specific Gravity, UA 1.015 1.005 - 1.030    Blood, Urine TRACE-INTACT (A) Negative    pH, UA 8.0 5.0 - 8.0    Protein, UA Negative Negative mg/dL    Urobilinogen, Urine 0.2 <2.0 E.U./dL    Nitrite, Urine Negative Negative    Leukocyte Esterase, Urine Negative Negative    Microscopic Examination YES     Urine Type NotGiven     Urine Reflex to Culture Not Indicated    Microscopic Urinalysis   Result Value Ref Range    WBC, UA 0-2 0 - 5 /HPF    RBC, UA 0-2 0 - 4 /HPF    Epithelial Cells, UA 11-20 (A) 0 - 5 /HPF    Bacteria, UA 2+ (A) None Seen /HPF    Amorphous, UA 3+ /HPF       RADIOLOGY    CT ABDOMEN PELVIS WO CONTRAST Additional Contrast? None    Result Date: 3/19/2022  EXAMINATION: CT OF THE ABDOMEN AND PELVIS WITHOUT CONTRAST 3/19/2022 3:04 pm TECHNIQUE: CT of the abdomen and pelvis was performed without the administration of intravenous contrast. Multiplanar reformatted images are provided for review. Dose modulation, iterative reconstruction, and/or weight based adjustment of the mA/kV was utilized to reduce the radiation dose to as low as reasonably achievable. COMPARISON: CT of the abdomen and pelvis of 02/10/2022 HISTORY: ORDERING SYSTEM PROVIDED HISTORY: left flank pain TECHNOLOGIST PROVIDED HISTORY: Reason for exam:->left flank pain Additional Contrast?->None Decision Support Exception - unselect if not a suspected or confirmed emergency medical condition->Emergency Medical Condition (MA) Is the patient pregnant?->No Reason for Exam: left flank pain x 1 day Relevant Medical/Surgical History: hx of kidney stones FINDINGS: Lower Chest:  Visualized portion of the lower chest demonstrates no acute abnormality. Organs:  Liver is normal in size and CT attenuation. Gallbladder, spleen, adrenals, and pancreas are unremarkable. No biliary ductal dilation. Left kidney is swollen and edematous, with an obstructing calculus at the left ureterovesicular junction measuring approximately 5 x 4 mm. There is associated upstream moderate hydroureteronephrosis.   Additional small nonobstructive calculi are noted bilaterally. No right hydroureteronephrosis. GI/Bowel: No bowel obstruction. Normal appendix in the right lower quadrant. No focal bowel wall thickening or inflammation. Pelvis: Bladder is within normal limits. Status post hysterectomy. No adnexal mass. Peritoneum/Retroperitoneum: Abdominal aorta is normal in course and caliber. No lymphadenopathy. No ascites or free intraperitoneal air. Bones/Soft Tissues: No acute soft tissue abnormality. No acute osseous abnormality or suspicious osseous lesion. Obstructing 5 x 4 mm calculus at the left ureterovesicular junction with associated moderate upstream hydroureteronephrosis. RECOMMENDATIONS: Unavailable     ED COURSE/MDM  Patient seen and evaluated. Old records reviewed. Diagnostic testing reviewed and results discussed. I have seen and evaluated this patient with supervising physician: Dulce Caballero MD. We thoroughly discussed the history, physical exam, diagnostic testing, emergency department course, plan and disposition. Mona Cannon presented to the ED today with above noted complaints. Arrival vital signs: Afebrile, heart rate is tachycardic at 105, BP normotensive. Well saturated on room air. Physical exam performed at 1434: Left flank tenderness to palpation. No adventitious breath sounds on exam.  Blood work: Does show leukocytosis is WBC elevated at 12.3, absolute neutrophils elevated at 8.9. No further differential shift. No anemia. No electrolyte abnormality. No evidence of acute kidney injury or transaminitis. UA: Without evidence of infection. Imaging: CT abdomen pelvis shows obstructing 5 x 4 mm calculus at the left UVJ with associated moderate upstream hydroureteronephrosis. Consults: Consulted urology and spoke with Dr. Albania Kuhn, he is in agreement with admission and advised to make the patient n.p.o. after midnight.   I did inform the admitting hospitalist about this as well as the patient. Medications given in the ED:   Medications   0.9 % sodium chloride bolus (1,000 mLs IntraVENous New Bag 3/19/22 1536)   0.9 % sodium chloride infusion (has no administration in time range)   ondansetron (ZOFRAN) injection 4 mg (4 mg IntraVENous Given 3/19/22 1540)   HYDROmorphone (DILAUDID) injection 1 mg (1 mg IntraVENous Given 3/19/22 1537)      I do feel patient will require admission for management of her symptoms, she is not getting control of her pain with medications at home that includes Percocet, Flomax and Toradol. Darby Cheek will be admitted for further observation and evaluation of Claydiego Grover Rigling's abdominal pain. As I have deemed necessary from their history, physical and studies, I have considered and evaluated Darby Cheek for the following diagnoses:  ACUTE APPENDICITIS, BOWEL OBSTRUCTION, CHOLECYSTITIS, DIVERTICULITIS, INCARCERATED HERNIA, PANCREATITIS, or PERFORATED BOWEL or ULCER. CLINICAL IMPRESSION    1. Ureterolithiasis    2. Ureteral colic    3. Left flank pain       DISPOSITION  Admit    Comment: Pleasenote this report has been produced using speech recognition software and may contain errors related to that system including errors in grammar, punctuation, and spelling, as well as words and phrases that may beinappropriate. If there are any questions or concerns please feel free to contact the dictating provider for clarification.         PERLA Wilson - CHIQUI  03/19/22 2303

## 2022-03-19 NOTE — ED NOTES
162 - PS to Dr Kailyn [facility]a [facility] consult  Re: pain control, kidney stone  1645 - Dr Kailyn Olivera called back to speak with CHEPE [place]  03/19/22 1648

## 2022-03-19 NOTE — ED NOTES
kidney stones off and on since 01/22. Pt state that she started yesterday with LL abd pain that goes around left side to LLB.       Marylou Moser RN  03/19/22 6759

## 2022-03-20 ENCOUNTER — ANESTHESIA (OUTPATIENT)
Dept: OPERATING ROOM | Age: 37
DRG: 694 | End: 2022-03-20
Payer: COMMERCIAL

## 2022-03-20 ENCOUNTER — ANESTHESIA EVENT (OUTPATIENT)
Dept: OPERATING ROOM | Age: 37
DRG: 694 | End: 2022-03-20
Payer: COMMERCIAL

## 2022-03-20 ENCOUNTER — APPOINTMENT (OUTPATIENT)
Dept: GENERAL RADIOLOGY | Age: 37
DRG: 694 | End: 2022-03-20
Payer: COMMERCIAL

## 2022-03-20 VITALS
RESPIRATION RATE: 23 BRPM | OXYGEN SATURATION: 100 % | SYSTOLIC BLOOD PRESSURE: 132 MMHG | DIASTOLIC BLOOD PRESSURE: 75 MMHG

## 2022-03-20 LAB
A/G RATIO: 1.7 (ref 1.1–2.2)
ALBUMIN SERPL-MCNC: 3.5 G/DL (ref 3.4–5)
ALP BLD-CCNC: 69 U/L (ref 40–129)
ALT SERPL-CCNC: 21 U/L (ref 10–40)
ANION GAP SERPL CALCULATED.3IONS-SCNC: 10 MMOL/L (ref 3–16)
AST SERPL-CCNC: 21 U/L (ref 15–37)
BACTERIA: ABNORMAL /HPF
BASOPHILS ABSOLUTE: 0 K/UL (ref 0–0.2)
BASOPHILS RELATIVE PERCENT: 0.2 %
BILIRUB SERPL-MCNC: 0.3 MG/DL (ref 0–1)
BILIRUBIN URINE: NEGATIVE
BLOOD, URINE: ABNORMAL
BUN BLDV-MCNC: 12 MG/DL (ref 7–20)
CALCIUM SERPL-MCNC: 7.7 MG/DL (ref 8.3–10.6)
CHLORIDE BLD-SCNC: 109 MMOL/L (ref 99–110)
CLARITY: CLEAR
CO2: 19 MMOL/L (ref 21–32)
COLOR: YELLOW
CREAT SERPL-MCNC: 1 MG/DL (ref 0.6–1.1)
EOSINOPHILS ABSOLUTE: 0.2 K/UL (ref 0–0.6)
EOSINOPHILS RELATIVE PERCENT: 2.3 %
EPITHELIAL CELLS, UA: ABNORMAL /HPF (ref 0–5)
GFR AFRICAN AMERICAN: >60
GFR NON-AFRICAN AMERICAN: >60
GLUCOSE BLD-MCNC: 106 MG/DL (ref 70–99)
GLUCOSE URINE: NEGATIVE MG/DL
HCT VFR BLD CALC: 35 % (ref 36–48)
HEMOGLOBIN: 11.9 G/DL (ref 12–16)
KETONES, URINE: ABNORMAL MG/DL
LEUKOCYTE ESTERASE, URINE: NEGATIVE
LYMPHOCYTES ABSOLUTE: 1.4 K/UL (ref 1–5.1)
LYMPHOCYTES RELATIVE PERCENT: 17.1 %
MCH RBC QN AUTO: 29.2 PG (ref 26–34)
MCHC RBC AUTO-ENTMCNC: 34.1 G/DL (ref 31–36)
MCV RBC AUTO: 85.5 FL (ref 80–100)
MICROSCOPIC EXAMINATION: YES
MONOCYTES ABSOLUTE: 1 K/UL (ref 0–1.3)
MONOCYTES RELATIVE PERCENT: 11.4 %
MUCUS: ABNORMAL /LPF
NEUTROPHILS ABSOLUTE: 5.8 K/UL (ref 1.7–7.7)
NEUTROPHILS RELATIVE PERCENT: 69 %
NITRITE, URINE: NEGATIVE
PDW BLD-RTO: 13 % (ref 12.4–15.4)
PH UA: 6.5 (ref 5–8)
PLATELET # BLD: 234 K/UL (ref 135–450)
PMV BLD AUTO: 7.1 FL (ref 5–10.5)
POTASSIUM REFLEX MAGNESIUM: 3.9 MMOL/L (ref 3.5–5.1)
PROTEIN UA: 30 MG/DL
RBC # BLD: 4.09 M/UL (ref 4–5.2)
RBC UA: >100 /HPF (ref 0–4)
SODIUM BLD-SCNC: 138 MMOL/L (ref 136–145)
SPECIFIC GRAVITY UA: 1.01 (ref 1–1.03)
TOTAL PROTEIN: 5.6 G/DL (ref 6.4–8.2)
URINE TYPE: ABNORMAL
UROBILINOGEN, URINE: 0.2 E.U./DL
WBC # BLD: 8.3 K/UL (ref 4–11)
WBC UA: ABNORMAL /HPF (ref 0–5)

## 2022-03-20 PROCEDURE — 96372 THER/PROPH/DIAG INJ SC/IM: CPT

## 2022-03-20 PROCEDURE — C1758 CATHETER, URETERAL: HCPCS | Performed by: UROLOGY

## 2022-03-20 PROCEDURE — 6370000000 HC RX 637 (ALT 250 FOR IP): Performed by: HOSPITALIST

## 2022-03-20 PROCEDURE — 6360000002 HC RX W HCPCS: Performed by: ANESTHESIOLOGY

## 2022-03-20 PROCEDURE — 6360000002 HC RX W HCPCS

## 2022-03-20 PROCEDURE — 3600000014 HC SURGERY LEVEL 4 ADDTL 15MIN: Performed by: UROLOGY

## 2022-03-20 PROCEDURE — 96361 HYDRATE IV INFUSION ADD-ON: CPT

## 2022-03-20 PROCEDURE — 96376 TX/PRO/DX INJ SAME DRUG ADON: CPT

## 2022-03-20 PROCEDURE — 3600000004 HC SURGERY LEVEL 4 BASE: Performed by: UROLOGY

## 2022-03-20 PROCEDURE — 2580000003 HC RX 258: Performed by: HOSPITALIST

## 2022-03-20 PROCEDURE — G0378 HOSPITAL OBSERVATION PER HR: HCPCS

## 2022-03-20 PROCEDURE — 1200000000 HC SEMI PRIVATE

## 2022-03-20 PROCEDURE — 0TJB8ZZ INSPECTION OF BLADDER, VIA NATURAL OR ARTIFICIAL OPENING ENDOSCOPIC: ICD-10-PCS | Performed by: UROLOGY

## 2022-03-20 PROCEDURE — 74018 RADEX ABDOMEN 1 VIEW: CPT

## 2022-03-20 PROCEDURE — 88300 SURGICAL PATH GROSS: CPT

## 2022-03-20 PROCEDURE — 87086 URINE CULTURE/COLONY COUNT: CPT

## 2022-03-20 PROCEDURE — 6360000002 HC RX W HCPCS: Performed by: UROLOGY

## 2022-03-20 PROCEDURE — 2709999900 HC NON-CHARGEABLE SUPPLY: Performed by: UROLOGY

## 2022-03-20 PROCEDURE — 2720000010 HC SURG SUPPLY STERILE: Performed by: UROLOGY

## 2022-03-20 PROCEDURE — 81001 URINALYSIS AUTO W/SCOPE: CPT

## 2022-03-20 PROCEDURE — 3700000000 HC ANESTHESIA ATTENDED CARE: Performed by: UROLOGY

## 2022-03-20 PROCEDURE — 6370000000 HC RX 637 (ALT 250 FOR IP): Performed by: INTERNAL MEDICINE

## 2022-03-20 PROCEDURE — 3700000001 HC ADD 15 MINUTES (ANESTHESIA): Performed by: UROLOGY

## 2022-03-20 PROCEDURE — 36415 COLL VENOUS BLD VENIPUNCTURE: CPT

## 2022-03-20 PROCEDURE — 96375 TX/PRO/DX INJ NEW DRUG ADDON: CPT

## 2022-03-20 PROCEDURE — C1769 GUIDE WIRE: HCPCS | Performed by: UROLOGY

## 2022-03-20 PROCEDURE — 0TC78ZZ EXTIRPATION OF MATTER FROM LEFT URETER, VIA NATURAL OR ARTIFICIAL OPENING ENDOSCOPIC: ICD-10-PCS | Performed by: UROLOGY

## 2022-03-20 PROCEDURE — 85025 COMPLETE CBC W/AUTO DIFF WBC: CPT

## 2022-03-20 PROCEDURE — 7100000000 HC PACU RECOVERY - FIRST 15 MIN: Performed by: UROLOGY

## 2022-03-20 PROCEDURE — 2580000003 HC RX 258: Performed by: UROLOGY

## 2022-03-20 PROCEDURE — 7100000001 HC PACU RECOVERY - ADDTL 15 MIN: Performed by: UROLOGY

## 2022-03-20 PROCEDURE — 6360000002 HC RX W HCPCS: Performed by: HOSPITALIST

## 2022-03-20 PROCEDURE — 82365 CALCULUS SPECTROSCOPY: CPT

## 2022-03-20 PROCEDURE — 80053 COMPREHEN METABOLIC PANEL: CPT

## 2022-03-20 RX ORDER — MAGNESIUM HYDROXIDE 1200 MG/15ML
LIQUID ORAL PRN
Status: DISCONTINUED | OUTPATIENT
Start: 2022-03-20 | End: 2022-03-20 | Stop reason: ALTCHOICE

## 2022-03-20 RX ORDER — OXYCODONE HYDROCHLORIDE 5 MG/1
5 TABLET ORAL PRN
Status: DISCONTINUED | OUTPATIENT
Start: 2022-03-20 | End: 2022-03-20 | Stop reason: HOSPADM

## 2022-03-20 RX ORDER — SODIUM CHLORIDE 9 MG/ML
25 INJECTION, SOLUTION INTRAVENOUS PRN
Status: DISCONTINUED | OUTPATIENT
Start: 2022-03-20 | End: 2022-03-20 | Stop reason: HOSPADM

## 2022-03-20 RX ORDER — ONDANSETRON 2 MG/ML
4 INJECTION INTRAMUSCULAR; INTRAVENOUS EVERY 10 MIN PRN
Status: DISCONTINUED | OUTPATIENT
Start: 2022-03-20 | End: 2022-03-20 | Stop reason: HOSPADM

## 2022-03-20 RX ORDER — PROPOFOL 10 MG/ML
INJECTION, EMULSION INTRAVENOUS PRN
Status: DISCONTINUED | OUTPATIENT
Start: 2022-03-20 | End: 2022-03-20 | Stop reason: SDUPTHER

## 2022-03-20 RX ORDER — MEPERIDINE HYDROCHLORIDE 50 MG/ML
12.5 INJECTION INTRAMUSCULAR; INTRAVENOUS; SUBCUTANEOUS EVERY 5 MIN PRN
Status: DISCONTINUED | OUTPATIENT
Start: 2022-03-20 | End: 2022-03-20 | Stop reason: HOSPADM

## 2022-03-20 RX ORDER — MIDAZOLAM HYDROCHLORIDE 1 MG/ML
INJECTION INTRAMUSCULAR; INTRAVENOUS PRN
Status: DISCONTINUED | OUTPATIENT
Start: 2022-03-20 | End: 2022-03-20 | Stop reason: SDUPTHER

## 2022-03-20 RX ORDER — MIDAZOLAM HYDROCHLORIDE 1 MG/ML
1 INJECTION INTRAMUSCULAR; INTRAVENOUS EVERY 5 MIN PRN
Status: DISCONTINUED | OUTPATIENT
Start: 2022-03-20 | End: 2022-03-20 | Stop reason: HOSPADM

## 2022-03-20 RX ORDER — HYDROMORPHONE HCL 110MG/55ML
0.25 PATIENT CONTROLLED ANALGESIA SYRINGE INTRAVENOUS EVERY 5 MIN PRN
Status: DISCONTINUED | OUTPATIENT
Start: 2022-03-20 | End: 2022-03-20 | Stop reason: SDUPTHER

## 2022-03-20 RX ORDER — SODIUM CHLORIDE 0.9 % (FLUSH) 0.9 %
5-40 SYRINGE (ML) INJECTION EVERY 12 HOURS SCHEDULED
Status: DISCONTINUED | OUTPATIENT
Start: 2022-03-20 | End: 2022-03-20 | Stop reason: HOSPADM

## 2022-03-20 RX ORDER — ONDANSETRON 2 MG/ML
INJECTION INTRAMUSCULAR; INTRAVENOUS PRN
Status: DISCONTINUED | OUTPATIENT
Start: 2022-03-20 | End: 2022-03-20 | Stop reason: SDUPTHER

## 2022-03-20 RX ORDER — TOPIRAMATE 25 MG/1
50 TABLET ORAL EVERY MORNING
Status: DISCONTINUED | OUTPATIENT
Start: 2022-03-20 | End: 2022-03-21 | Stop reason: HOSPADM

## 2022-03-20 RX ORDER — SODIUM CHLORIDE 0.9 % (FLUSH) 0.9 %
5-40 SYRINGE (ML) INJECTION PRN
Status: DISCONTINUED | OUTPATIENT
Start: 2022-03-20 | End: 2022-03-20 | Stop reason: HOSPADM

## 2022-03-20 RX ORDER — HYDRALAZINE HYDROCHLORIDE 20 MG/ML
5 INJECTION INTRAMUSCULAR; INTRAVENOUS
Status: DISCONTINUED | OUTPATIENT
Start: 2022-03-20 | End: 2022-03-20 | Stop reason: HOSPADM

## 2022-03-20 RX ORDER — OXYCODONE HYDROCHLORIDE AND ACETAMINOPHEN 5; 325 MG/1; MG/1
1 TABLET ORAL EVERY 4 HOURS PRN
Status: DISCONTINUED | OUTPATIENT
Start: 2022-03-20 | End: 2022-03-21 | Stop reason: HOSPADM

## 2022-03-20 RX ORDER — OXYCODONE HYDROCHLORIDE 5 MG/1
10 TABLET ORAL PRN
Status: DISCONTINUED | OUTPATIENT
Start: 2022-03-20 | End: 2022-03-20 | Stop reason: HOSPADM

## 2022-03-20 RX ORDER — DIPHENHYDRAMINE HYDROCHLORIDE 50 MG/ML
12.5 INJECTION INTRAMUSCULAR; INTRAVENOUS
Status: DISCONTINUED | OUTPATIENT
Start: 2022-03-20 | End: 2022-03-20 | Stop reason: HOSPADM

## 2022-03-20 RX ORDER — PHENAZOPYRIDINE HYDROCHLORIDE 100 MG/1
200 TABLET, FILM COATED ORAL
Status: DISCONTINUED | OUTPATIENT
Start: 2022-03-20 | End: 2022-03-21 | Stop reason: HOSPADM

## 2022-03-20 RX ORDER — HYDROMORPHONE HCL 110MG/55ML
0.5 PATIENT CONTROLLED ANALGESIA SYRINGE INTRAVENOUS EVERY 5 MIN PRN
Status: DISCONTINUED | OUTPATIENT
Start: 2022-03-20 | End: 2022-03-20 | Stop reason: SDUPTHER

## 2022-03-20 RX ADMIN — KETOROLAC TROMETHAMINE 30 MG: 30 INJECTION, SOLUTION INTRAMUSCULAR; INTRAVENOUS at 10:04

## 2022-03-20 RX ADMIN — OXYCODONE AND ACETAMINOPHEN 1 TABLET: 5; 325 TABLET ORAL at 17:39

## 2022-03-20 RX ADMIN — SODIUM CHLORIDE: 9 INJECTION, SOLUTION INTRAVENOUS at 20:32

## 2022-03-20 RX ADMIN — HYDROMORPHONE HYDROCHLORIDE 0.5 MG: 1 INJECTION, SOLUTION INTRAMUSCULAR; INTRAVENOUS; SUBCUTANEOUS at 09:09

## 2022-03-20 RX ADMIN — TOPIRAMATE 50 MG: 25 TABLET, FILM COATED ORAL at 10:33

## 2022-03-20 RX ADMIN — CEFAZOLIN 2000 MG: 10 INJECTION, POWDER, FOR SOLUTION INTRAVENOUS at 08:34

## 2022-03-20 RX ADMIN — KETOROLAC TROMETHAMINE 30 MG: 30 INJECTION, SOLUTION INTRAMUSCULAR; INTRAVENOUS at 18:16

## 2022-03-20 RX ADMIN — HYDROMORPHONE HYDROCHLORIDE 0.5 MG: 1 INJECTION, SOLUTION INTRAMUSCULAR; INTRAVENOUS; SUBCUTANEOUS at 09:27

## 2022-03-20 RX ADMIN — HYDROMORPHONE HYDROCHLORIDE 0.5 MG: 1 INJECTION, SOLUTION INTRAMUSCULAR; INTRAVENOUS; SUBCUTANEOUS at 09:19

## 2022-03-20 RX ADMIN — SENNOSIDES 8.6 MG: 8.6 TABLET, COATED ORAL at 13:23

## 2022-03-20 RX ADMIN — SODIUM CHLORIDE: 9 INJECTION, SOLUTION INTRAVENOUS at 03:14

## 2022-03-20 RX ADMIN — TAMSULOSIN HYDROCHLORIDE 0.4 MG: 0.4 CAPSULE ORAL at 10:02

## 2022-03-20 RX ADMIN — ENOXAPARIN SODIUM 40 MG: 40 INJECTION SUBCUTANEOUS at 20:21

## 2022-03-20 RX ADMIN — PROPOFOL 180 MG: 10 INJECTION, EMULSION INTRAVENOUS at 08:29

## 2022-03-20 RX ADMIN — ONDANSETRON 4 MG: 2 INJECTION INTRAMUSCULAR; INTRAVENOUS at 08:29

## 2022-03-20 RX ADMIN — MIDAZOLAM HYDROCHLORIDE 2 MG: 2 INJECTION, SOLUTION INTRAMUSCULAR; INTRAVENOUS at 08:25

## 2022-03-20 RX ADMIN — ONDANSETRON 4 MG: 2 INJECTION INTRAMUSCULAR; INTRAVENOUS at 14:48

## 2022-03-20 RX ADMIN — MORPHINE SULFATE 4 MG: 4 INJECTION, SOLUTION INTRAMUSCULAR; INTRAVENOUS at 10:51

## 2022-03-20 RX ADMIN — SODIUM CHLORIDE: 9 INJECTION, SOLUTION INTRAVENOUS at 10:23

## 2022-03-20 RX ADMIN — PHENAZOPYRIDINE HYDROCHLORIDE 200 MG: 100 TABLET ORAL at 13:24

## 2022-03-20 RX ADMIN — ONDANSETRON 4 MG: 2 INJECTION INTRAMUSCULAR; INTRAVENOUS at 00:03

## 2022-03-20 RX ADMIN — PHENAZOPYRIDINE HYDROCHLORIDE 200 MG: 100 TABLET ORAL at 17:12

## 2022-03-20 RX ADMIN — HYDROMORPHONE HYDROCHLORIDE 0.5 MG: 1 INJECTION, SOLUTION INTRAMUSCULAR; INTRAVENOUS; SUBCUTANEOUS at 09:00

## 2022-03-20 ASSESSMENT — PULMONARY FUNCTION TESTS
PIF_VALUE: 16
PIF_VALUE: 16
PIF_VALUE: 1
PIF_VALUE: 13
PIF_VALUE: 14
PIF_VALUE: 5
PIF_VALUE: 16
PIF_VALUE: 17
PIF_VALUE: 13
PIF_VALUE: 13
PIF_VALUE: 1
PIF_VALUE: 1
PIF_VALUE: 13
PIF_VALUE: 13
PIF_VALUE: 21
PIF_VALUE: 12
PIF_VALUE: 16
PIF_VALUE: 13
PIF_VALUE: 1
PIF_VALUE: 1
PIF_VALUE: 14
PIF_VALUE: 13
PIF_VALUE: 1
PIF_VALUE: 4
PIF_VALUE: 13
PIF_VALUE: 1

## 2022-03-20 ASSESSMENT — PAIN SCALES - GENERAL
PAINLEVEL_OUTOF10: 7
PAINLEVEL_OUTOF10: 8
PAINLEVEL_OUTOF10: 9
PAINLEVEL_OUTOF10: 8
PAINLEVEL_OUTOF10: 5
PAINLEVEL_OUTOF10: 7
PAINLEVEL_OUTOF10: 9
PAINLEVEL_OUTOF10: 8

## 2022-03-20 ASSESSMENT — PAIN DESCRIPTION - FREQUENCY
FREQUENCY: INTERMITTENT

## 2022-03-20 ASSESSMENT — PAIN DESCRIPTION - PAIN TYPE
TYPE: SURGICAL PAIN

## 2022-03-20 ASSESSMENT — PAIN DESCRIPTION - ORIENTATION
ORIENTATION: LEFT

## 2022-03-20 ASSESSMENT — PAIN DESCRIPTION - LOCATION
LOCATION: FLANK
LOCATION: FLANK

## 2022-03-20 ASSESSMENT — PAIN DESCRIPTION - DESCRIPTORS
DESCRIPTORS: BURNING

## 2022-03-20 NOTE — PROGRESS NOTES
Patient returned from cysto. Patient c/o bilateral flank pain, see MAR. Urology notified and KUB ordered. Will monitor.

## 2022-03-20 NOTE — PROGRESS NOTES
Pt brought to PACU. Report obtained from OR RN and anesthesia.  Pt placed on monitor SR and O2 at RA

## 2022-03-20 NOTE — PROGRESS NOTES
Patient sleeping at this time, pain is under control. Report given to [facility]. Verified with Aaliyah Ashford in [facility] patient on monitor.

## 2022-03-20 NOTE — FLOWSHEET NOTE
03/20/22 0927   Pain Assessment   Pain Assessment 0-10   Pain Level 8   Pain Type Surgical pain   Pain Orientation Left   Pain Frequency Intermittent   Pain Descriptors Burning   Non-Pharmaceutical Pain Intervention(s) Emotional support  (Dilaudid 0.5mg)   Patient wakes up crying, Dilaudid 0.5 mg given x 4 doses.

## 2022-03-20 NOTE — PROGRESS NOTES
Hospitalist Progress Note      PCP: Cailin Castro DO    Date of Admission: 3/19/2022    Chief Complaint:   Flank pain    Hospital Course:     Lucendia Brunner is a 43 y.o. female who presented to the ED to be evaluated for a 2-day history of left-sided flank pain and dysuria consistent with previous episodes of kidney stones. Patient reports that she has required urologic intervention in the past, with the most recent surgery occurring in January 2022. Patient reports that her home doses of Zofran and Vicodin have not effectively decreased her pain. Patient states that she does not drink dark sodas or tea.     Upon arrival to the ED CT abdomen pelvis was obtained revealing a 5 x 4 mm obstructing calculus at left UVJ with associated moderate hydronephrosis. Labs were obtained and notable for mild leukocytosis but otherwise unremarkable. Patient received IVF NS bolus in addition to a dosage of IV Dilaudid and morphine prior to request for admission. Consult placed to urologist Dr. Glass Ask request the patient be n.p.o. after midnight for possible cystoscopy and stone retrieval.     Subjective:   Patient s/p cystoscopy and stone removal. Patient still with left flank pain. She has dysuria. No fever or chills. She states flank pain is severe. No nausea. No vomiting.      Medications:  Reviewed    Infusion Medications    sodium chloride 100 mL/hr at 03/20/22 1023    sodium chloride       Scheduled Medications    topiramate  50 mg Oral QAM    phenazopyridine  200 mg Oral TID WC    propranolol  60 mg Oral Daily    tamsulosin  0.4 mg Oral Daily    sodium chloride flush  10 mL IntraVENous 2 times per day    enoxaparin  40 mg SubCUTAneous Daily     PRN Meds: sodium chloride flush, sodium chloride, potassium chloride **OR** potassium alternative oral replacement **OR** potassium chloride, magnesium sulfate, senna, acetaminophen **OR** acetaminophen, ondansetron **OR** ondansetron, acetaminophen, ketorolac, morphine **OR** morphine      Intake/Output Summary (Last 24 hours) at 3/20/2022 1322  Last data filed at 3/20/2022 1211  Gross per 24 hour   Intake 1780.48 ml   Output 1200 ml   Net 580.48 ml       Physical Exam Performed:    BP (!) 97/59   Pulse 70   Temp 98.6 °F (37 °C) (Oral)   Resp 18   Ht 5' 8\" (1.727 m)   Wt 200 lb (90.7 kg)   LMP 03/14/2019   SpO2 96%   BMI 30.41 kg/m²     General appearance: Mild distress, appears stated age and cooperative. HEENT: Pupils equal, round, and reactive to light. Conjunctivae/corneas clear. Neck: Supple, with full range of motion. No jugular venous distention. Trachea midline. Respiratory:  Normal respiratory effort. Clear to auscultation, bilaterally without Rales/Wheezes/Rhonchi. Cardiovascular: Regular rate and rhythm with normal S1/S2 without murmurs, rubs or gallops. Abdomen: Soft, non-tender, non-distended with normal bowel sounds. CVA tenderness. Musculoskeletal: No clubbing, cyanosis or edema bilaterally. Full range of motion without deformity. Skin: Skin color, texture, turgor normal.  No rashes or lesions. Neurologic:  Neurovascularly intact without any focal sensory/motor deficits. Cranial nerves: II-XII intact, grossly non-focal.  Psychiatric: Alert and oriented, thought content appropriate, normal insight  Capillary Refill: Brisk,3 seconds, normal   Peripheral Pulses: +2 palpable, equal bilaterally       Labs:   Recent Labs     03/19/22  1450 03/20/22  0609   WBC 12.3* 8.3   HGB 13.5 11.9*   HCT 39.9 35.0*    234     Recent Labs     03/19/22  1450 03/20/22  0609    138   K 3.8 3.9    109   CO2 24 19*   BUN 14 12   CREATININE 0.9 1.0   CALCIUM 8.7 7.7*     Recent Labs     03/19/22  1450 03/20/22  0609   AST 25 21   ALT 29 21   BILITOT 0.4 0.3   ALKPHOS 85 69     No results for input(s): INR in the last 72 hours. No results for input(s): [place] in the last 72 hours.     Urinalysis:      Lab Results   Component Value Date NITRU Negative 03/19/2022    WBCUA 0-2 03/19/2022    BACTERIA 2+ 03/19/2022    RBCUA 0-2 03/19/2022    BLOODU TRACE-INTACT 03/19/2022    SPECGRAV 1.015 03/19/2022    GLUCOSEU Negative 03/19/2022       Radiology:  XR ABDOMEN (KUB) (SINGLE AP VIEW)   Final Result   Subtle demonstration of the right renal calculi. Otherwise calcifications   are obscured with bowel gas and potentially by pelvic phleboliths. These   findings would be best evaluated with follow-up abdomen pelvic CT when   indicated. XR ABDOMEN (KUB) (SINGLE AP VIEW)   Final Result      CT ABDOMEN PELVIS WO CONTRAST Additional Contrast? None   Final Result   Obstructing 5 x 4 mm calculus at the left ureterovesicular junction with   associated moderate upstream hydroureteronephrosis. RECOMMENDATIONS:   Unavailable                 Assessment/Plan:    Active Hospital Problems    Diagnosis     Hydroureteronephrosis [N13.30]     Ureterovesical junction (UVJ) obstruction [N13.5]     Acquired ureterovesical junction (UVJ) obstruction [N13.5]     Nephrolithiasis [N20.0]      1. Obstructive nephrolithiasis - S/P cysto with stone removal. Urology following. Continue pain control and IVF. Plan to discharge in AM.  2. Dysuria - Recheck UA. Start pyridium. 3. Acute pain - Continue morphine. Transition to oral pain meds in AM.   4. Migraines - Restart Topamax at home dose. DVT Prophylaxis: Lovenox  Diet: ADULT DIET; Regular  Code Status: Full Code    PT/OT Eval Status: Ambulatory    Dispo - Home in AM if stable.     Jarrod Roy MD

## 2022-03-20 NOTE — ANESTHESIA PRE PROCEDURE
Department of Anesthesiology  Preprocedure Note       Name:  Jerod Koroma   Age:  43 y.o.  :  1985                                          MRN:  [id number]         Date:  3/20/2022      Surgeon: Mikael Trinidad):  Wyatt Abraham MD    Procedure: Procedure(s):  CYSTOSCOPY; LEFT URETEROSCOPY WITH STONE MANIPULATION AND LEFT URETERAL STENT INSERTION    Medications prior to admission:   Prior to Admission medications    Medication Sig Start Date End Date Taking? Authorizing Provider   topiramate (TOPAMAX) 25 MG tablet Take 50 mg by mouth daily    Yes Historical Provider, MD   ondansetron (ZOFRAN ODT) 4 MG disintegrating tablet Take 1 tablet by mouth every 8 hours as needed for Nausea  Patient not taking: Reported on 3/19/2022 2/10/22   PERLA Ramos CNP   tamsulosin [facility]) 0.4 MG capsule Take 1 capsule by mouth daily for 5 doses 2/10/22 2/15/22  PERLA Ramos CNP   ibuprofen (ADVIL;MOTRIN) 600 MG tablet Take 1 tablet by mouth 3 times daily as needed for Pain  Patient not taking: Reported on 3/19/2022 2/10/22   PERLA Ramos CNP   eletriptan (RELPAX) 40 MG tablet TAKE ONE-HALF TO ONE TABLET BY MOUTH ONCE AS NEEDED FOR MIGRAINE MAY REPEAT IN 2 HOURS IF NEEDED 21   Tabatha Mcneil DO   SUMAtriptan (IMITREX) 100 MG tablet Take 1 tablet by mouth once as needed for Migraine (may repeat 2nd dose within 2 hours.  max 2 tabs in 24 hours.) 21  Tabatha Mcneil DO   ondansetron (ZOFRAN ODT) 4 MG disintegrating tablet Take 1 tablet by mouth every 8 hours as needed for Nausea or Vomiting  Patient not taking: Reported on 3/19/2022 11/5/21   Tabatha Donahue DO   propranolol (INDERAL LA) 60 MG extended release capsule Take 1 capsule by mouth daily  Patient not taking: Reported on 3/19/2022 10/14/21   Tabatha Mcneil DO   eletriptan (RELPAX) 40 MG tablet TAKE ONE-HALF TO ONE TABLET BY MOUTH ONCE AS NEEDED FOR MIGRAINE MAY REPEAT IN 2 HOURS IF NEEDED 10/19/20   PERLA Prasad - CNP   spironolactone (ALDACTONE) 25 MG tablet Take 25 mg by mouth daily  Patient not taking: Reported on 9/27/2021    Historical Provider, MD       Current medications:    Current Facility-Administered Medications   Medication Dose Route Frequency Provider Last Rate Last Admin    0.9 % sodium chloride infusion   IntraVENous Continuous Anabel Torres  mL/hr at 03/20/22 0317 Rate Verify at 03/20/22 0317    propranolol (INDERAL LA) extended release capsule 60 mg  60 mg Oral Daily Anabel Torres MD        tamsulosin (FLOMAX) capsule 0.4 mg  0.4 mg Oral Daily Anabel Torres MD   0.4 mg at 03/19/22 1821    sodium chloride flush 0.9 % injection 10 mL  10 mL IntraVENous 2 times per day Anabel Torres MD        sodium chloride flush 0.9 % injection 10 mL  10 mL IntraVENous PRN Anabel Torres MD        0.9 % sodium chloride infusion  25 mL IntraVENous PRN Anabel Torres MD        potassium chloride (KLOR-CON M) extended release tablet 40 mEq  40 mEq Oral PRN Anabel Torres MD        Or    potassium bicarb-citric acid (EFFER-K) effervescent tablet 40 mEq  40 mEq Oral PRN Anabel Torres MD        Or    potassium chloride 10 mEq/100 mL IVPB (Peripheral Line)  10 mEq IntraVENous PRN Anabel Torres MD        magnesium sulfate 2000 mg in 50 mL IVPB premix  2,000 mg IntraVENous PRN Anabel Torres MD        enoxaparin (LOVENOX) injection 40 mg  40 mg SubCUTAneous Daily Anabel Torres MD        senna (SENOKOT) tablet 8.6 mg  1 tablet Oral Daily PRN Anabel Torres MD        acetaminophen (TYLENOL) tablet 650 mg  650 mg Oral Q6H PRN Anabel Torres MD        Or   [address] [place] [address] acetaminophen (TYLENOL) suppository 650 mg  650 mg Rectal Q6H PRN Anabel Torres MD        ondansetron (ZOFRAN-ODT) disintegrating tablet 4 mg  4 mg Oral Q8H PRN Anabel Torres MD        Or    ondansetron [facility]) injection 4 mg  4 mg IntraVENous Q6H PRN Anabel Torres MD   4 mg at 03/20/22 0003    acetaminophen (TYLENOL) tablet 1,000 mg  1,000 mg Oral Q6H PRN Negar Ocasio MD        ketorolac (TORADOL) injection 30 mg  30 mg IntraVENous Q6H PRN Negar Ocasio MD   30 mg at 03/19/22 1820    morphine (PF) injection 2 mg  2 mg IntraVENous Q4H PRN Negar Ocasio MD        Or   Pham morphine sulfate (PF) injection 4 mg  4 mg IntraVENous Q4H PRN Negar Ocasio MD   4 mg at 03/19/22 1194       Allergies: Allergies   Allergen Reactions    Fentanyl Nausea And Vomiting     She reports it made her extremely sick and did not help with pain, made her feel like she wanted to die    Topamax [Topiramate] Other (See Comments)     Kidney stones    Cymbalta [Duloxetine Hcl] Other (See Comments)     moodiness       Problem List:    Patient Active Problem List   Diagnosis Code    Migraine with aura and without status migrainosus, not intractable G43. 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 9) E66.9    Hydroureteronephrosis N13.30    Ureterovesical junction (UVJ) obstruction N13.5    Acquired ureterovesical junction (UVJ) obstruction N13.5       Past Medical History:        Diagnosis Date    Anxiety     Depression     Ectopic pregnancy 2013    Endometriosis     IBS (irritable bowel syndrome)     Infertility, female     Has seen Dr. Jaret Pike in past multiple IUI's natural conception this pregnancy.  Interstitial cystitis     Kidney stones     x2    Migraines     Obesity (BMI 30.0-34. 9)     PCOS (polycystic ovarian syndrome)     Prolonged emergence from general anesthesia     Stomach ulcer     Urinary incontinence        Past Surgical History:        Procedure Laterality Date    ABDOMEN SURGERY      BLADDER SURGERY      CYSTOSCOPY      lithotripsy for stone extraction    ECTOPIC PREGNANCY SURGERY  12/21/13    ectopic pregnancy excision laparoscopic left linear salpingostomy    HYSTERECTOMY      HYSTERECTOMY ABDOMINAL Bilateral 3/18/2019    ROBOTIC ASSISTED TOTAL LAPAROSCOPIC HYSTERECTOMY, BILATERAL SALPINGECTION   performed by Magali Arzate MD at [facility] HISTORY  10/05/2017     DxLS, Evacuation of ectopic pregnancy-fimbriated portion of tube    TONSILLECTOMY      WISDOM TOOTH EXTRACTION         Social History:    Social History     Tobacco Use    Smoking status: Never Smoker    Smokeless tobacco: Never Used   Substance Use Topics    Alcohol use: No                                Counseling given: Not Answered      Vital Signs (Current):   Vitals:    03/19/22 1828 03/19/22 1945 03/19/22 2352 03/20/22 0314   BP: 128/70 102/66 112/68 121/71   Pulse: 100 82 90 82   Resp: 18 18 16 20   Temp: 98.5 °F (36.9 °C) 97.4 °F (36.3 °C) 98.7 °F (37.1 °C) 98.4 °F (36.9 °C)   TempSrc: Oral Oral Oral Oral   SpO2: 100% 99% 96% 95%   Weight:       Height:                                                  BP Readings from Last 3 Encounters:   03/20/22 121/71   02/10/22 119/77   12/28/21 129/82       NPO Status:                                                                                 BMI:   Wt Readings from Last 3 Encounters:   03/19/22 200 lb (90.7 kg)   02/10/22 200 lb (90.7 kg)   12/28/21 200 lb (90.7 kg)     Body mass index is 30.41 kg/m².     CBC:   Lab Results   Component Value Date    WBC 8.3 03/20/2022    RBC 4.09 03/20/2022    HGB 11.9 03/20/2022    HCT 35.0 03/20/2022    MCV 85.5 03/20/2022    RDW 13.0 03/20/2022     03/20/2022       CMP:   Lab Results   Component Value Date     03/20/2022    K 3.9 03/20/2022     03/20/2022    CO2 19 03/20/2022    BUN 12 03/20/2022    CREATININE 1.0 03/20/2022    GFRAA >60 03/20/2022    GFRAA >60 08/19/2012    AGRATIO 1.7 03/20/2022    LABGLOM >60 03/20/2022    GLUCOSE 106 03/20/2022    PROT 5.6 03/20/2022    PROT 6.1 08/18/2012    CALCIUM 7.7 03/20/2022    BILITOT 0.3 03/20/2022    ALKPHOS 69 03/20/2022    AST 21 03/20/2022    ALT 21 03/20/2022       POC Tests: No results for input(s): POCGLU, POCNA, POCK, POCCL, POCBUN, POCHEMO, POCHCT in the last 72 hours. Coags:   Lab Results   Component Value Date    PROTIME 10.9 10/13/2015    PROTIME 14.5 04/21/2011    INR 1.01 10/13/2015    APTT 31.8 11/14/2013       HCG (If Applicable):   Lab Results   Component Value Date    PREGTESTUR Negative 03/18/2019        ABGs: No results found for: PHART, PO2ART, ONJ6ZDV, EXB8AAN, BEART, L8NXPPLE     Type & Screen (If Applicable):  No results found for: LABABO, LABRH    Drug/Infectious Status (If Applicable):  No results found for: HIV, HEPCAB    COVID-19 Screening (If Applicable): No results found for: COVID19        Anesthesia Evaluation  Patient summary reviewed and Nursing notes reviewed no history of anesthetic complications:   Airway: Mallampati: II  TM distance: >3 FB   Neck ROM: full  Mouth opening: > = 3 FB Dental: normal exam         Pulmonary:Negative Pulmonary ROS                              Cardiovascular:Negative CV ROS                      Neuro/Psych:   (+) headaches: migraine headaches, psychiatric history:            GI/Hepatic/Renal:   (+) PUD,      (-) GERD, liver disease and no renal disease       Endo/Other: Negative Endo/Other ROS       (-) diabetes mellitus               Abdominal:             Vascular: negative vascular ROS. Other Findings:             Anesthesia Plan      general     ASA 2 - emergent     (I discussed with the patient the risks and benefits of PIV, general anesthesia, IV Narcotics, PACU. All questions were answered the patient agrees with the plan)  Induction: intravenous. MIPS: Prophylactic antiemetics administered. Anesthetic plan and risks discussed with patient.                       Eleanor Gruber MD   3/20/2022

## 2022-03-20 NOTE — ANESTHESIA POSTPROCEDURE EVALUATION
Department of Anesthesiology  Postprocedure Note    Patient: Gregory Connor  MRN: [id number]  YOB: 1985  Date of evaluation: 3/20/2022  Time:  11:37 AM     Procedure Summary     Date: 03/20/22 Room / Location: [address]    Anesthesia Start: 9253 Anesthesia Stop: 2627    Procedure: CYSTOSCOPY; LEFT URETEROSCOPY WITH STONE MANIPULATION, AND EXTRACTION (Left Bladder) Diagnosis: (LEFT URETERAL CALCULUS)    Surgeons: Matheus Tucker MD Responsible Provider: Deloris Ye MD    Anesthesia Type: general ASA Status: 2 - Emergent          Anesthesia Type: general    Claire Phase I: Claire Score: 10    Claire Phase II:      Last vitals: Reviewed and per EMR flowsheets.        Anesthesia Post Evaluation    Patient location during evaluation: PACU  Level of consciousness: awake  Airway patency: patent  Nausea & Vomiting: no nausea  Complications: no  Cardiovascular status: blood pressure returned to baseline  Respiratory status: acceptable  Hydration status: euvolemic

## 2022-03-20 NOTE — CONSULTS
Urology Attending Consult Note      Reason for Admission: Kidney stones    History: 43Year old female with history of kidney stones with distal left 3mm stone impacted and leading to pain not able to be controlled with oral medications. She is here for pain control and treatment. Meds: see med rec  Family History, Social History, Review of Systems:  Reviewed and agreed to as per chart    Exam:    Vitals:  BP 95/64   Pulse 90   Temp 98.9 °F (37.2 °C) (Oral)   Resp 18   Ht 5' 8\" (1.727 m)   Wt 200 lb (90.7 kg)   LMP 2019   SpO2 95%   BMI 30.41 kg/m²   Temp  Av.3 °F (36.8 °C)  Min: 97.4 °F (36.3 °C)  Max: 98.9 °F (37.2 °C)    Intake/Output Summary (Last 24 hours) at 3/20/2022 0850  Last data filed at 3/20/2022 0751  Gross per 24 hour   Intake 1780.48 ml   Output 400 ml   Net 1380.48 ml       Physical:    Well developed, well nourished in no acute distress   Mood indicates no abnormalities. Pt doesnt appear depressed   Orientated to time and place   Neck is supple, trachea is midline   Respiratory effort is normal   Cardiovascular show no extremity swelling   Abdomen no masses or hernias are palpated, there is no tenderness. Liver and Spleen appear normal.   Skin show no abnormal lesions   Lymph nodes are not palpated in the inguinal, neck, or axillary area.       Labs:  WBC:    Lab Results   Component Value Date    WBC 8.3 2022     Hemoglobin/Hematocrit:    Lab Results   Component Value Date    HGB 11.9 2022    HCT 35.0 2022     BMP:    Lab Results   Component Value Date     2022    K 3.9 2022     2022    CO2 19 2022    BUN 12 2022    LABALBU 3.5 2022    CREATININE 1.0 2022    CALCIUM 7.7 2022    GFRAA >60 2022    GFRAA >60 2012    LABGLOM >60 2022     PT/INR:    Lab Results   Component Value Date    PROTIME 10.9 10/13/2015    PROTIME 14.5 2011    INR 1.01 10/13/2015     PTT:    Lab Results   Component Value Date    APTT 31.8 11/14/2013   [APTT    Impression/Plan:     Patient with 3mm distal left ureteral stone    Proceed with cystoscopy, left ureteroscopy, stone manipulation      Rossana Archibald MD

## 2022-03-20 NOTE — PROGRESS NOTES
Assessment complete and charted earlier in shift. Pt medicated with IV morphine and IV zofran x1 thus far. Pt does report some pain with urination, bilateral (L > R) flank pain though states that R flank pain has subsided since 3/19 AM, and suprapubic pain. Urine remains yellow/clear and nonodorous. Pt remains afebrile and no stones noted from straining. Call light within reach, will continue to monitor.

## 2022-03-20 NOTE — OP NOTE
Operative Note      Patient: Marisa Hyatt  YOB: 1985  MRN: [id number]    Date of Procedure: 3/20/2022    Pre-Op Diagnosis: LEFT URETERAL CALCULUS    Post-Op Diagnosis: Same       Procedure(s):  CYSTOSCOPY; LEFT URETEROSCOPY WITH STONE MANIPULATION, AND EXTRACTION    Surgeon(s):  Timothy Capellan MD    Assistant:   Surgical Assistant: Dano Kirkland    Anesthesia: General    Estimated Blood Loss (mL): Minimal    Complications: None    Specimens:   ID Type Source Tests Collected by Time Destination   A : LEFT URETERAL CALCULUS Tissue Tissue SURGICAL PATHOLOGY Timothy Capellan MD 3/20/2022 4417        Implants:  * No implants in log *      Drains: * No LDAs found *    Findings: Distal left ureteral stone with mild to moderate inflammation    Detailed Description of Procedure:   Ms. Leonor Rubinstein was brought the the operative suite. She was given a/an General anesthesia without complication. Ms. Leonor Rubinstein was then put in the lithotomy position, and was prepped and draped in the usual sterile fashion. At this point, a 21 Ivorian rigid cystoscope with a 30 degree lens was placed at the urethral meatus and passed into the bladder. The urethra was noted to be mildly inflamed and stenotic. Once into the bladder, cystoscopic examination demonstrated no evidence of suspiciious lesions anywhere in the bladder, including the base, the back walls the lateral walls or at the dome. Attention was turned to the left ureteral orifice, which was cannulated with a Sensor wire. This was passed under fluoroscopic imaging into the renal pelvis. At this point, the rigid ureteroscope was passed into the ureter. In the distal ureter was a 3x5mm stone with moderate inflammation surrounding this. A stone basket was used to remove this. Replacement of the scope demonstrated no other stones. Given the fact that there was only mild inflammation, a stent was not placed.  The bladder was drained, the cystoscope was removed, and the patient was taken to recovery in stable condition.     Electronically signed by Haris Bland MD on 3/20/2022 at 8:51 AM

## 2022-03-21 VITALS
HEART RATE: 76 BPM | WEIGHT: 215.4 LBS | HEIGHT: 68 IN | DIASTOLIC BLOOD PRESSURE: 64 MMHG | SYSTOLIC BLOOD PRESSURE: 100 MMHG | BODY MASS INDEX: 32.64 KG/M2 | TEMPERATURE: 98.6 F | RESPIRATION RATE: 16 BRPM | OXYGEN SATURATION: 96 %

## 2022-03-21 LAB — URINE CULTURE, ROUTINE: NORMAL

## 2022-03-21 PROCEDURE — 2580000003 HC RX 258: Performed by: HOSPITALIST

## 2022-03-21 PROCEDURE — 6370000000 HC RX 637 (ALT 250 FOR IP): Performed by: INTERNAL MEDICINE

## 2022-03-21 PROCEDURE — 6370000000 HC RX 637 (ALT 250 FOR IP): Performed by: HOSPITALIST

## 2022-03-21 PROCEDURE — G0378 HOSPITAL OBSERVATION PER HR: HCPCS

## 2022-03-21 RX ORDER — TAMSULOSIN HYDROCHLORIDE 0.4 MG/1
0.4 CAPSULE ORAL DAILY
Qty: 30 CAPSULE | Refills: 0 | Status: ON HOLD | OUTPATIENT
Start: 2022-03-21 | End: 2022-07-30 | Stop reason: SDUPTHER

## 2022-03-21 RX ADMIN — TOPIRAMATE 50 MG: 25 TABLET, FILM COATED ORAL at 08:39

## 2022-03-21 RX ADMIN — SODIUM CHLORIDE: 9 INJECTION, SOLUTION INTRAVENOUS at 05:54

## 2022-03-21 RX ADMIN — ACETAMINOPHEN 650 MG: 325 TABLET ORAL at 04:10

## 2022-03-21 RX ADMIN — TAMSULOSIN HYDROCHLORIDE 0.4 MG: 0.4 CAPSULE ORAL at 08:39

## 2022-03-21 RX ADMIN — PHENAZOPYRIDINE HYDROCHLORIDE 200 MG: 100 TABLET ORAL at 10:32

## 2022-03-21 ASSESSMENT — PAIN SCALES - GENERAL: PAINLEVEL_OUTOF10: 7

## 2022-03-21 NOTE — PROGRESS NOTES
Patient:  Denisa Nobles  YOB: 1985   Date of Service:  03/21/22      Urology Attending Daily Progress Note    Chief complaint: \" Feeling better\"    Interval HPI:  The patient did well overnight. Pain is controlled with medications. Tolerating diet. Denies nausea, vomiting, fevers, or chest pain. Ambulating minimally. Did pass another stone overnight-patient believes it is from the right side     Objective:    Patient Vitals for the past 8 hrs:   BP Temp Temp src Pulse Resp SpO2 Weight   03/21/22 0838 100/64 98.6 °F (37 °C) Oral 76 16 96 % --   03/21/22 0404 95/61 98.6 °F (37 °C) Oral 88 18 96 % 215 lb 6.4 oz (97.7 kg)     I/O last 3 completed shifts: In: 4754.4 [P.O.:1520; I.V.:3234.4]  Out: 6980 [Urine:4475]     Physical Exam:   Constitutional: comfortable in hospital bed, no acute distress  Abdomen: soft, nontender, no guarding   Psych: normal mood and affect, appropriately answers questions   Skin, extremities: stable, exposed skin intact, no digital cyanosis     Labs:     No results found for: PSA  Lab Results   Component Value Date    CREATININE 1.0 03/20/2022    CREATININE 0.9 03/19/2022    CREATININE 0.6 02/10/2022     Lab Results   Component Value Date    COLORU Yellow 03/20/2022    NITRU Negative 03/20/2022    GLUCOSEU Negative 03/20/2022    KETUA TRACE 03/20/2022    UROBILINOGEN 0.2 03/20/2022    BILIRUBINUR Negative 03/20/2022    BILIRUBINUR neg 04/02/2021     Lab Results   Component Value Date    WBC 8.3 03/20/2022    HGB 11.9 (L) 03/20/2022    HCT 35.0 (L) 03/20/2022    MCV 85.5 03/20/2022     03/20/2022       Radiology:  \"Imaging was independently reviewed by myself and I agree with the radiology interpretation    Assessment:   Status post left ureteroscopy with stone extraction on 3/20/2022 with Dr. Marianne Jones passed a right-sided stone overnight-feels much better today    Plan:  --Patient is feeling better at this time-no abdominal pain-I think we can avoid renal ultrasound at this time  --Long discussion with patient-she will follow up with Dr. Claudetta Nay to discuss shockwave lithotripsy as an outpatient-she will also benefit from a 24-hour urinalysis which she is discussed with Dr. Claudetta Nay and they will do down the line    OK to discharge from urology standpoint-signing off-call with questions    Grazyna Pradhan MD  [facility]

## 2022-03-21 NOTE — CARE COORDINATION
Chart reviewed day 2. Spoke with Ingrid Rutherford. Patient with PCP and insurance provider. Cleared for D/c per urology. Spoke with Dr Jacklyn Sauer, no DCP needs. Jeff Amaya RN  .  Jeff Amaya RN

## 2022-03-21 NOTE — PROGRESS NOTES
PIV removed. Pt a/o. Discharge instructions given to pt and all questions answered. Pt has belongings and paperwork. Pt discharged home in stable condition.

## 2022-03-21 NOTE — PROGRESS NOTES
Small (roughly 1.5 mm stone) passed. Heat packs provided for some soreness and cramping on R sided but does voice relief of sharp pain after passing stone. Call light within reach, will continue to monitor.

## 2022-03-21 NOTE — PROGRESS NOTES
Pt stated that now she doesn't think they will do an US since she passed a stone and now eating/drinking. Ginny Oh remains in bathroom. Stated no nausea; no emesis. Stated discomfort and the frequency, educated on pyridium earlier in shift and pt in agreement to keep taking it. Ambulates in room. Bed locked in lowest position; side rails 2/4; call light and bedside table within reach of pt.

## 2022-03-21 NOTE — DISCHARGE SUMMARY
Hospital Medicine Discharge Summary    Patient: Daisy Jefferson     Age: 43 y.o. Gender: female  : 1985   MRN: [id number]  Code Status: Full     Admit Date: 3/19/2022   Discharge Date: 3/21/2022    Disposition:  Home     Condition at Discharge: Stable    Primary Care Provider: Kailash العلي DO    Admitting Physician: Jacky Hitchcock MD  Discharge Physician: Misael Arzate MD       Discharge Diagnoses: Active Hospital Problems    Diagnosis     Hydroureteronephrosis [N13.30]     Ureterovesical junction (UVJ) obstruction [N13.5]     Acquired ureterovesical junction (UVJ) obstruction [N13.5]     Nephrolithiasis [N20.0]        Hospital Course:       Daisy Jefferson is a 43 y.o. female who presented to the ED to be evaluated for a 2-day history of left-sided flank pain and dysuria consistent with previous episodes of kidney stones. Patient reports that she has required urologic intervention in the past, with the most recent surgery occurring in 2022. Patient reports that her home doses of Zofran and Vicodin have not effectively decreased her pain. Patient states that she does not drink dark sodas or tea.     Upon arrival to the ED CT abdomen pelvis was obtained revealing a 5 x 4 mm obstructing calculus at left UVJ with associated moderate hydronephrosis. Labs were obtained and notable for mild leukocytosis but otherwise unremarkable. Patient received IVF NS bolus in addition to a dosage of IV Dilaudid and morphine prior to request for admission. Consult placed to urologist Dr. Flaco Lange request the patient be n.p.o. after midnight for possible cystoscopy and stone retrieval.     Assessment/Plan:    Obstructive nephrolithiasis - S/P cysto with stone removal. Follow up with Urology to discuss Lithotripsy. Migraines - Restart Topamax at home dose.      Exam:   /64   Pulse 76   Temp 98.6 °F (37 °C) (Oral)   Resp 16   Ht 5' 8\" (1.727 m)   Wt 215 lb 6.4 oz (97.7 kg)   LMP 03/14/2019   SpO2 96%   BMI 32.75 kg/m²   General appearance: NAD, appears stated age and cooperative. HEENT: Pupils equal, round, and reactive to light. Conjunctivae/corneas clear. Neck: Supple, with full range of motion. No jugular venous distention. Trachea midline. Respiratory:  Normal respiratory effort. Clear to auscultation, bilaterally without Rales/Wheezes/Rhonchi. Cardiovascular: Regular rate and rhythm with normal S1/S2 without murmurs, rubs or gallops. Abdomen: Soft, non-tender, non-distended with normal bowel sounds. CVA tenderness. Musculoskeletal: No clubbing, cyanosis or edema bilaterally. Full range of motion without deformity. Skin: Skin color, texture, turgor normal.  No rashes or lesions. Neurologic:  Neurovascularly intact without any focal sensory/motor deficits. Cranial nerves: II-XII intact, grossly non-focal.  Psychiatric: Alert and oriented, thought content appropriate, normal insight  Capillary Refill: Brisk,3 seconds, normal   Peripheral Pulses: +2 palpable, equal bilaterally       Patient Discharge Instructions: Follow up:  1.   Urology    Discharge Medications:   Discharge Medication List as of 3/21/2022  2:47 PM        Discharge Medication List as of 3/21/2022  2:47 PM      CONTINUE these medications which have CHANGED    Details   tamsulosin (FLOMAX) 0.4 MG capsule Take 1 capsule by mouth daily, Disp-30 capsule, R-0Normal           Discharge Medication List as of 3/21/2022  2:47 PM      CONTINUE these medications which have NOT CHANGED    Details   topiramate (TOPAMAX) 25 MG tablet Take 50 mg by mouth daily Historical Med      ibuprofen (ADVIL;MOTRIN) 600 MG tablet Take 1 tablet by mouth 3 times daily as needed for Pain, Disp-30 tablet, R-0Print      !! eletriptan (RELPAX) 40 MG tablet TAKE ONE-HALF TO ONE TABLET BY MOUTH ONCE AS NEEDED FOR MIGRAINE MAY REPEAT IN 2 HOURS IF NEEDED, Disp-6 tablet, R-5Normal      SUMAtriptan (IMITREX) 100 MG tablet Take 1 tablet by mouth once as needed for Migraine (may repeat 2nd dose within 2 hours. max 2 tabs in 24 hours.), Disp-9 tablet, R-5Normal      propranolol (INDERAL LA) 60 MG extended release capsule Take 1 capsule by mouth daily, Disp-30 capsule, R-5Normal      !! eletriptan (RELPAX) 40 MG tablet TAKE ONE-HALF TO ONE TABLET BY MOUTH ONCE AS NEEDED FOR MIGRAINE MAY REPEAT IN 2 HOURS IF NEEDED, Disp-9 tablet,R-5Normal      spironolactone (ALDACTONE) 25 MG tablet Take 25 mg by mouth dailyHistorical Med       !! - Potential duplicate medications found. Please discuss with provider. Discharge Medication List as of 3/21/2022  2:47 PM      STOP taking these medications       ondansetron (ZOFRAN ODT) 4 MG disintegrating tablet Comments:   Reason for Stopping:         ondansetron (ZOFRAN ODT) 4 MG disintegrating tablet Comments:   Reason for Stopping:                 Significant Test Results    CT ABDOMEN PELVIS WO CONTRAST Additional Contrast? None    Result Date: 3/19/2022  EXAMINATION: CT OF THE ABDOMEN AND PELVIS WITHOUT CONTRAST 3/19/2022 3:04 pm TECHNIQUE: CT of the abdomen and pelvis was performed without the administration of intravenous contrast. Multiplanar reformatted images are provided for review. Dose modulation, iterative reconstruction, and/or weight based adjustment of the mA/kV was utilized to reduce the radiation dose to as low as reasonably achievable. COMPARISON: CT of the abdomen and pelvis of 02/10/2022 HISTORY: ORDERING SYSTEM PROVIDED HISTORY: left flank pain TECHNOLOGIST PROVIDED HISTORY: Reason for exam:->left flank pain Additional Contrast?->None Decision Support Exception - unselect if not a suspected or confirmed emergency medical condition->Emergency Medical Condition (MA) Is the patient pregnant?->No Reason for Exam: left flank pain x 1 day Relevant Medical/Surgical History: hx of kidney stones FINDINGS: Lower Chest:  Visualized portion of the lower chest demonstrates no acute abnormality.  Organs:  Liver is normal in size and CT attenuation. Gallbladder, spleen, adrenals, and pancreas are unremarkable. No biliary ductal dilation. Left kidney is swollen and edematous, with an obstructing calculus at the left ureterovesicular junction measuring approximately 5 x 4 mm. There is associated upstream moderate hydroureteronephrosis. Additional small nonobstructive calculi are noted bilaterally. No right hydroureteronephrosis. GI/Bowel: No bowel obstruction. Normal appendix in the right lower quadrant. No focal bowel wall thickening or inflammation. Pelvis: Bladder is within normal limits. Status post hysterectomy. No adnexal mass. Peritoneum/Retroperitoneum: Abdominal aorta is normal in course and caliber. No lymphadenopathy. No ascites or free intraperitoneal air. Bones/Soft Tissues: No acute soft tissue abnormality. No acute osseous abnormality or suspicious osseous lesion. Obstructing 5 x 4 mm calculus at the left ureterovesicular junction with associated moderate upstream hydroureteronephrosis. RECOMMENDATIONS: Unavailable     XR ABDOMEN (KUB) (SINGLE AP VIEW)    Result Date: 3/20/2022  EXAMINATION: ONE SUPINE XRAY VIEW(S) OF THE ABDOMEN 3/20/2022 11:31 am COMPARISON: 03/20/2022 HISTORY: ORDERING SYSTEM PROVIDED HISTORY: Assess right sided kidney stone TECHNOLOGIST PROVIDED HISTORY: Reason for exam:->Assess right sided kidney stone FINDINGS: There is some gas and  stool within the colon . Small bowel is unremarkable. Included lungs demonstrate linear vertical atelectatic change versus scarring left lower lobe retrocardiac region. The renal calculi are predominately obscured with bowel gas. At least several stones are seen overlying the gas and stool on the right, mid to lower kidney. Several pelvic phleboliths are seen bilaterally. No definitive demonstration of the distal left ureteral stone noted by CT scan 1 day prior. .     Subtle demonstration of the right renal calculi.   Otherwise calcifications are obscured with bowel gas and potentially by pelvic phleboliths. These findings would be best evaluated with follow-up abdomen pelvic CT when indicated. XR ABDOMEN (KUB) (SINGLE AP VIEW)    Result Date: 3/20/2022  EXAMINATION: ONE SUPINE XRAY VIEW(S) OF THE ABDOMEN 3/20/2022 8:25 am COMPARISON: Abdomen pelvic CT 03/19/2022 HISTORY: ORDERING SYSTEM PROVIDED HISTORY: or TECHNOLOGIST PROVIDED HISTORY: Reason for exam:->or Reason for Exam: .1 min fluoro FINDINGS: Single intraoperative digital image is obtained lower abdomen and pelvis greater to the right. A wire or catheter type device is noted left abdomen and pelvis as well. This is likely procedural related. Consults:     IP CONSULT TO UROLOGY  IP CONSULT TO HOSPITALIST    Labs: For convenience and continuity at follow-up the following most recent labs are provided:    Lab Results   Component Value Date    WBC 8.3 03/20/2022    HGB 11.9 03/20/2022    HCT 35.0 03/20/2022    MCV 85.5 03/20/2022     03/20/2022     03/20/2022    K 3.9 03/20/2022     03/20/2022    CO2 19 03/20/2022    BUN 12 03/20/2022    CREATININE 1.0 03/20/2022    CALCIUM 7.7 03/20/2022    PHOS 2.9 08/19/2012    TROPONINI <0.01 12/28/2021    ALKPHOS 69 03/20/2022    ALT 21 03/20/2022    AST 21 03/20/2022    BILITOT 0.3 03/20/2022    LABALBU 3.5 03/20/2022    LDLCALC 90 11/14/2013    TRIG 67 11/14/2013    LABA1C 5.1 04/02/2021     Lab Results   Component Value Date    INR 1.01 10/13/2015    INR 1.1 11/14/2013         The patient was seen and examined on day of discharge and this discharge summary is in conjunction with any daily progress note from day of discharge. Time spent on discharge is more than 30 minutes in the examination, evaluation, counseling and review of medications and discharge plan. Signed:    Gary Lauren MD   4/17/2022    Thank you Elpidio Shepherd DO for the opportunity to be involved in this patient's care.  If you have any questions or concerns please feel free to contact my office [phone].

## 2022-03-21 NOTE — PROGRESS NOTES
Pt keeping herself NPO because she stated she is going to have an US done today. Writer informed pt that nothing was stated in report about an US, there is no mention of an US in a note, and no orders for an US are placed. Urology paged.

## 2022-03-22 ENCOUNTER — CARE COORDINATION (OUTPATIENT)
Dept: CASE MANAGEMENT | Age: 37
End: 2022-03-22

## 2022-03-22 ENCOUNTER — TELEPHONE (OUTPATIENT)
Dept: FAMILY MEDICINE CLINIC | Age: 37
End: 2022-03-22

## 2022-03-22 NOTE — CARE COORDINATION
[facility] Trans[facility] Initial Follow Up Call    Call within 2 business days of discharge: Yes    Patient: Sharon Padron Patient : 1985   MRN: [id number]  Reason for Admission: ureterolithiasis  Discharge Date: 3/21/22 RARS: Readmission Risk Score: 9 ( )      Last Discharge [facility]       Complaint Diagnosis Description Type Department Provider    3/19/22 Flank Pain Ureterolithiasis . .. ED to Hosp-Admission (Discharged) (ADMITTED) Zenobia Wray MD; Sandie Acuna. .. Spoke with: Hailey 399: [facility]    Non-face-to-face services provided:  Obtained and reviewed discharge summary and/or continuity of care documents  Communication with specialists who will assume or re-assume care of the patient's system-specific problems-patient continues with post procedure pain and call placed to urology group     Was this an external facility discharge? No Discharge Facility: na    Challenges to be reviewed by the provider   Additional needs identified to be addressed with provider Yes  patient continues to have post procedure pain rated 6-7/10 today       Method of communication with provider : phone    Advance Care Planning:   Does patient have an Advance Directive:  not on file. Was this a readmission? No  Patient stated reason for admission: cystoscopy; left ureteroscopy with stone manipulation and extraction  Patients top risk factors for readmission: medical condition-cystoscopy    Care Transition Nurse (CTN) contacted the patient by telephone to perform post hospital discharge assessment. Verified name and  with patient as identifiers. Provided introduction to self, and explanation of the CTN role. CTN reviewed discharge instructions, medical action plan and red flags with patient who verbalized understanding. Patient given an opportunity to ask questions and does not have any further questions or concerns at this time.  Were discharge instructions available to patient? Yes. Reviewed appropriate site of care based on symptoms and resources available to patient including: Specialist. The patient agrees to contact the PCP office for questions related to their healthcare. Medication reconciliation was performed with patient, who verbalizes understanding of administration of home medications. COVID Risk Education    COVID-19 na    Educated patient about risk for severe COVID-19 due to risk factors according to CDC guidelines. Discussed COVID vaccination status Yes. Education provided on COVID-19 vaccination as appropriate. Discussed exposure protocols and quarantine with CDC Guidelines. Patient was given an opportunity to verbalize any questions and concerns and agrees to contact CTN or health care provider for questions related to their healthcare. Was patient discharged with a pulse oximeter? No Discussed and confirmed pulse oximeter discharge instructions and when to notify provider or seek emergency care. Patient answered call and verified . Patient pleasant and agreeable to transition call. Patient continues to have post procedure pain and rated 6-7/10 on pain scale this morning. Patient had placed call to urologist on call last night due to pain, but did not receive a call back. Reached out to office after opening this morning. Patient had left over percocet from a kidney stone that she had in January. Patient was taking percocet with minimal relief. CTN discussed medication list and noted in system. Instructed that follow up call could be placed to urology group. Call placed to urology group, Dr Priscila King office- spoke to Isela Pike in regards to patient's continued pain and on call physician placed yesterday evening. Message was noted in system and patient had spoken to nurse and message was sent to Dr Priscila King as high priority. Patient was also instructed to return to ER for pain management if needed per urology nurse.  Patient was hesitant to return to ER, but stated she would if pain worsened. Denied any acute needs at present time. Agreeable to f/u calls. Educated on the use of urgent care or physicians 24 hr access line if assistance is needed after hours. CTN provided contact information for future needs. Plan for follow-up call in 3-5 days based on severity of symptoms and risk factors. Plan for next call: follow up appointment-patient had to cancel lithotripsy scheduled for thurs of this week- did she reschedul? Care Transitions 24 Hour Call    Do you have any ongoing symptoms?: No  Do you have a copy of your discharge instructions?: Yes  Do you have all of your prescriptions and are they filled?: Yes  Have you been contacted by a [address]?: No  Have you scheduled your follow up appointment?: Yes  How are you going to get to your appointment?: Car - family or friend to transport  Were you discharged with any Home Care or Post Acute Services: No  Do you feel like you have everything you need to keep you well at home?: Yes  Care Transitions Interventions         Follow Up  No future appointments.     Zaire Handy RN

## 2022-03-22 NOTE — TELEPHONE ENCOUNTER
[facility] Trans[facility] Initial Follow Up Call    Outreach made within 2 business days of discharge: Yes    Patient: Sue Hamilton Patient : 1985   MRN: <[id number]>  Reason for Admission: There are no discharge diagnoses documented for the most recent discharge. Discharge Date: 3/21/22       Spoke with: Patient, did not wish to schedule at this time. She is seeing specialist. Informed to call if she changes her mind. Discharge department/facility: [facility]    Non-face-to-face services provided:  Obtained and reviewed discharge summary and/or continuity of care documents    TCM Interactive Patient Contact:  Was patient able to fill all prescriptions: Yes  Was patient instructed to bring all medications to the follow-up visit: Yes  Is patient taking all medications as directed in the discharge summary? Yes  Does patient understand their discharge instructions: Yes  Does patient have questions or concerns that need addressed prior to 7-14 day follow up office visit: no    Scheduled appointment with PCP within 7-14 days    Follow Up  No future appointments.     Rosalinda Matthews RN

## 2022-03-23 LAB
CALCULI COMPOSITION: NORMAL
MASS: 14 MG
STONE DESCRIPTION: NORMAL

## 2022-03-25 ENCOUNTER — CARE COORDINATION (OUTPATIENT)
Dept: CASE MANAGEMENT | Age: 37
End: 2022-03-25

## 2022-03-25 NOTE — CARE COORDINATION
[facility] Trans[facility] Follow Up Call    3/25/2022    Patient: Emmette Hatchet  Patient : 1985   MRN: [id number]  Reason for Admission: ureterovesical junction obstruction  Discharge Date: 3/21/22 RARS: Readmission Risk Score: 9 ( )         Spoke with: Emmette Hatchet    Needs to be reviewed by the provider   Additional needs identified to be addressed with provider: No  none         Method of communication with provider : none    Care Transition Nurse (CTN) contacted the patient by telephone to follow up after recent hospital admission. Addressed changes since last contact: none  Discussed follow-up appointments. If no appointment was previously scheduled, appointment scheduling offered: Yes. Non-[facility] follow up appointment(s):     Advance Care Planning:   Does patient have an Advance Directive: reviewed and current, reviewed and needs to be updated, not on file; education provided, not on file, patient declined education, decision maker updated, referral to internal ACP facilitator and Reviewed on prior CTN outreach. Discussed appropriate site of care based on symptoms and resources available to patient including: Specialist. The patient agrees to contact the PCP office for questions related to their healthcare. CTN provided contact information for future needs. Plan for follow-up call in 7-10 days based on severity of symptoms and risk factors. Patient answered call and verified . Patient pleasant and agreeable to transition call. Patient stated that she passed a kidney stone Tuesday evening and fragments on Wednesday and pain lessened. Patient stated that urology office did not call her back after 2 calls on Monday and 2 calls on Tuesday to discuss pain management. Patient has follow up scheduled and will discuss at appt. Denied any acute needs at present time. Agreeable to f/u calls.   Educated on the use of urgent care or physicians 24 hr access line if assistance is needed after hours. Care Transitions Subsequent and Final Call    Subsequent and Final Calls  Do you have any ongoing symptoms?: No  Have your medications changed?: No  Do you have any questions related to your medications?: No  Do you currently have any active services?: No  Do you have any needs or concerns that I can assist you with?: No  Identified Barriers: None  Care Transitions Interventions  Other Interventions: Follow Up  No future appointments.     Matias Jensen RN

## 2022-03-31 ENCOUNTER — CARE COORDINATION (OUTPATIENT)
Dept: CASE MANAGEMENT | Age: 37
End: 2022-03-31

## 2022-03-31 NOTE — CARE COORDINATION
[facility] Transitions Follow Up Call    3/31/2022    Patient: Alcides Yeboah  Patient : 1985   MRN: <[id number]>  Reason for Admission: UVJ obstruction  Discharge Date: 3/21/22 RARS: Readmission Risk Score: 9 ( )         Spoke with: Anjelica Transitions Follow Up Call    Needs to be reviewed by the provider   Additional needs identified to be addressed with provider: No  none             Method of communication with provider : none      Care Transition Nurse (CTN) contacted the patient by telephone to follow up after admission. Verified name and  with patient as identifiers. Addressed changes since last contact: none  Discussed follow-up appointments. If no appointment was previously scheduled, appointment scheduling offered: Yes. Is follow up appointment scheduled within 7 days of discharge? Yes. Advance Care Planning:   Does patient have an Advance Directive: not on file. Advance Care Planning   Healthcare Decision Maker:      CTN reviewed discharge instructions, medical action plan and red flags with patient and discussed any barriers to care and/or understanding of plan of care after discharge. Discussed appropriate site of care based on symptoms and resources available to patient including: PCP  Specialist. The patient agrees to contact the PCP office for questions related to their healthcare. Patients top risk factors for readmission: medical condition-UVJ obstruction    Prefers calls after 4 pm. States she is doing well. Denies pain, reports some occ discomfort to (R) flank, but otherwise good. Denies hematuria, N/V/D, fever/chills. Appetite good. Denies problems with bowels or bladder. Denies medication changes, though states tentative plan with neurology to stop or change Topamax d/t increased risk for more stones. F/u with urology 4/15. Denies needs. CTN provided contact information for future needs.  Plan for follow-up call in 7-10 days based on severity of symptoms and risk factors. Care Transitions Subsequent and Final Call    Subsequent and Final Calls  Do you have any ongoing symptoms?: No  Have your medications changed?: No  Do you have any questions related to your medications?: No  Do you currently have any active services?: No  Do you have any needs or concerns that I can assist you with?: No  Identified Barriers: None  Care Transitions Interventions  Other Interventions: Follow Up  No future appointments.     Scar Holland, LPN

## 2022-04-07 ENCOUNTER — CARE COORDINATION (OUTPATIENT)
Dept: CASE MANAGEMENT | Age: 37
End: 2022-04-07

## 2022-04-07 NOTE — CARE COORDINATION
[facility] Trans[facility] Follow Up Call    2022    Patient: Paul Olivera  Patient : 1985   MRN: <[id number]>  Reason for Admission: UVJ obstruction  Discharge Date: 3/21/22 RARS: Readmission Risk Score: 9 ( )         Spoke with: NA    Attempted to reach patient via phone for transition call. VM left stating purpose of call along with my contact information requesting a return call. Harper Ramos LPN [address] [place] [address]  Care Transitions  [phone]    Care Transitions Subsequent and Final Call    Subsequent and Final Calls  Care Transitions Interventions  Other Interventions: Follow Up  No future appointments.     Harper Ramos LPN

## 2022-04-14 ENCOUNTER — CARE COORDINATION (OUTPATIENT)
Dept: CASE MANAGEMENT | Age: 37
End: 2022-04-14

## 2022-04-14 NOTE — CARE COORDINATION
[facility] Trans[facility] Follow Up Call    2022    Patient: Nisa Flores  Patient : 1985   MRN: <[id number]>  Reason for Admission: UVJ obstruction  Discharge Date: 3/21/22 RARS: Readmission Risk Score: 9 ( )         Spoke with: An[facility]ica Transitions Follow Up Call    Needs to be reviewed by the provider   Additional needs identified to be addressed with provider: No  none             Method of communication with provider : none      Care Transition Nurse (CTN) contacted the patient by telephone to follow up after admission. Verified name and  with patient as identifiers. Addressed changes since last contact: none  Discussed follow-up appointments. If no appointment was previously scheduled, appointment scheduling offered: Yes. Is follow up appointment scheduled within 7 days of discharge? Yes. Advance Care Planning:   Does patient have an Advance Directive: not on file. CTN reviewed discharge instructions, medical action plan and red flags with patient and discussed any barriers to care and/or understanding of plan of care after discharge. Discussed appropriate site of care based on symptoms and resources available to patient including: PCP  Specialist. The patient agrees to contact the PCP office for questions related to their healthcare. Patients top risk factors for readmission: medical condition-UVJ obstruction    Patient verified  and was pleasant and agreeable to transition calls. States she is well. Denies pain, hematuria, N/V/D, fever/chills. Appetite good. Denies problems with bowels or bladder. Urology f/u tomorrow. Discussing Topamax RX at that time. Denies needs. CTN provided contact information for future needs. Plan for follow-up call in 7-10 days based on severity of symptoms and risk factors.     Care Transitions Subsequent and Final Call    Subsequent and Final Calls  Do you have any ongoing symptoms?: No  Have your medications changed?: No  Do you have any questions related to your medications?: No  Do you currently have any active services?: No  Do you have any needs or concerns that I can assist you with?: No  Identified Barriers: None  Care Transitions Interventions  Other Interventions: Follow Up  No future appointments.     Alivia Null LPN

## 2022-04-21 ENCOUNTER — CARE COORDINATION (OUTPATIENT)
Dept: CASE MANAGEMENT | Age: 37
End: 2022-04-21

## 2022-04-21 NOTE — CARE COORDINATION
[facility] Trans[facility] Follow Up Call    2022    Patient: Rin Dang  Patient : 1985   MRN: [id number]  Reason for Admission: UVJ obstruction  Discharge Date: 3/21/22 RARS: Readmission Risk Score: 9 ( )         Spoke with: Rin Dang    Needs to be reviewed by the provider   Additional needs identified to be addressed with provider: No  none         Method of communication with provider : none    Care Transition Nurse (CTN) contacted the patient by telephone to follow up after recent hospital admission. Addressed changes since last contact: none  Discussed follow-up appointments. If no appointment was previously scheduled, appointment scheduling offered: Yes. Non-[facility] follow up appointment(s): na    Advance Care Planning:   Does patient have an Advance Directive: reviewed and current, reviewed and needs to be updated, not on file; education provided, not on file, patient declined education, decision maker updated, referral to internal ACP facilitator and Reviewed on prior CTN outreach. Discussed appropriate site of care based on symptoms and resources available to patient including: PCP  Specialist. The patient agrees to contact the PCP office for questions related to their healthcare. Patients top risk factors for readmission: medical condition-kidney stone  Interventions to address risk factors: Obtained and reviewed discharge summary and/or continuity of care documents    Patient answered call and verified . Patient pleasant and agreeable to transition call. Patient feeling well. Seen by urology since last contact and discuss plan of action if kidney stones were to return. Patient confirmed that she is taking all medication as directed. Denied any acute needs at present time. Educated on the use of urgent care or physicians 24 hr access line if assistance is needed after hours. CTN provided contact information for future needs.      No further follow-up call

## 2022-04-26 ENCOUNTER — TELEMEDICINE (OUTPATIENT)
Dept: PRIMARY CARE CLINIC | Age: 37
End: 2022-04-26
Payer: COMMERCIAL

## 2022-04-26 ENCOUNTER — TELEPHONE (OUTPATIENT)
Dept: FAMILY MEDICINE CLINIC | Age: 37
End: 2022-04-26

## 2022-04-26 DIAGNOSIS — R68.89 FLU-LIKE SYMPTOMS: Primary | ICD-10-CM

## 2022-04-26 PROCEDURE — 99213 OFFICE O/P EST LOW 20 MIN: CPT | Performed by: NURSE PRACTITIONER

## 2022-04-26 ASSESSMENT — ENCOUNTER SYMPTOMS
VOMITING: 1
COUGH: 1

## 2022-04-26 NOTE — TELEPHONE ENCOUNTER
----- Message from Js Kirk sent at 4/26/2022  8:43 AM EDT -----  Subject: Appointment Request    Reason for Call: Urgent Cough Cold    QUESTIONS  Type of Appointment? Established Patient  Reason for appointment request? Other - [facility] unable to schedule  Additional Information for Provider? [facility] is unable to schedule for Dr. Mirtha Nava. Transferred pt to practice to schedule  ---------------------------------------------------------------------------  --------------  CALL BACK INFO  What is the best way for the office to contact you? OK to leave message on   voicemail  Preferred Call Back Phone Number? [phone]  ---------------------------------------------------------------------------  --------------  SCRIPT ANSWERS  Relationship to Patient? Self  Are you currently unable to finish sentences due to any difficulty   breathing? No  Are you unable to swallow liquids? No  Are you having fevers (100.4 or greater), chills, or sweats? Yes   Have you been diagnosed with, awaiting test results for, or told that you   are suspected of having COVID-19 (Coronavirus)? (If patient has tested   negative or was tested as a requirement for work, school, or travel and   not based on symptoms, answer no)? No  Within the past 10 days have you developed any of the following symptoms   (answer no if symptoms have been present longer than 10 days or began   more than 10 days ago)? Fever or Chills, Cough, Shortness of breath or   difficulty breathing, Loss of taste or smell, Sore throat, Nasal   congestion, Sneezing or runny nose, Fatigue or generalized body aches   (answer no if pain is specific to a body part e.g. back pain), Diarrhea,   Headache?  Yes

## 2022-04-26 NOTE — PROGRESS NOTES
Bisi Townsend (:  1985) is a Established patient, here for evaluation of the following:    ASSESSMENT/PLAN:  Below is the assessment and plan developed based on review of pertinent history, physical exam, labs, studies, and medications. 1. Flu-like symptoms  Tylenol for fever or aches  Push fluids  Diet as tolerated  Treat symptoms with OTC medications as needed  Go to ED for worsening or signs of dehydration, as discussed    Call office for for follow up for any worsening of condition or failure to improve    Return if symptoms worsen or fail to improve. SUBJECTIVE/OBJECTIVE:  Ranjit Elizabeth states she started having chills and body aches on . Her daughter has flu A and was in ED for dehydration. Monday patient had fever of 102.8, chills, vomited x 1, feels need to clear throat. Denies post nasal drip and nasal congestion. She has headache. She denies sore throat, cough, sinus congestion. She has taken tylenol for fever and it controls it. So far today she is afebrile. Patient feels she needs a flu test today for employer and will go to urgent care. Her PCP office is not testing today. Discussed pros and cons of tamiflu and patient declined it at this time, will treat symptoms as needed. Please see recommendations below    Review of Systems   Constitutional: Positive for chills, fatigue and fever. HENT: Positive for congestion. Respiratory: Positive for cough. Gastrointestinal: Positive for vomiting. Neurological: Positive for headaches.        Patient-Reported Vitals 10/2/2021   Patient-Reported Weight 204   Patient-Reported Height 5' 7\"   Patient-Reported Temperature 97.6         Physical Exam    [INSTRUCTIONS:  \"[x]\" Indicates a positive item  \"[]\" Indicates a negative item  -- DELETE ALL ITEMS NOT EXAMINED]    Constitutional: [x] Appears well-developed and well-nourished [x] No apparent distress      [] Abnormal -     Mental status: [x] Alert and awake  [x] Oriented to person/place/time [x] Able to follow commands    [] Abnormal -     Eyes:   EOM    [x]  Normal    [] Abnormal -   Sclera  [x]  Normal    [] Abnormal -          Discharge [x]  None visible   [] Abnormal -     HENT: [x] Normocephalic, atraumatic  [] Abnormal -   [x] Mouth/Throat: Mucous membranes are moist    External Ears [x] Normal  [] Abnormal -    Neck: [x] No visualized mass [] Abnormal -     Pulmonary/Chest: [x] Respiratory effort normal   [x] No visualized signs of difficulty breathing or respiratory distress        [] Abnormal -      Musculoskeletal:   [x] Normal gait with no signs of ataxia         [x] Normal range of motion of neck        [] Abnormal -     Neurological:        [x] No Facial Asymmetry (Cranial nerve 7 motor function) (limited exam due to video visit)          [x] No gaze palsy        [] Abnormal -          Skin:        [x] No significant exanthematous lesions or discoloration noted on facial skin         [] Abnormal -            Psychiatric:       [x] Normal Affect [] Abnormal -        [x] No Hallucinations    Other pertinent observable physical exam findings:-      On this date 4/26/2022 I have spent 20 minutes reviewing previous notes, test results and face to face (virtual) with the patient discussing the diagnosis and importance of compliance with the treatment plan as well as documenting on the day of the visitAlberto Jefferson, was evaluated through a synchronous (real-time) audio-video encounter. The patient (or guardian if applicable) is aware that this is a billable service, which includes applicable co-pays. This Virtual Visit was conducted with patient's (and/or legal guardian's) consent. The visit was conducted pursuant to the emergency declaration under the [address] [place] [address], [address] [place] [address] authority and the [employer] General Act. Patient identification was verified, and a caregiver was present when appropriate.  The patient was located at home in a state where the provider was licensed to provide care.     --PERLA Irizarry

## 2022-07-26 ENCOUNTER — HOSPITAL ENCOUNTER (INPATIENT)
Age: 37
LOS: 1 days | Discharge: HOME | DRG: 690 | End: 2022-07-27
Attending: EMERGENCY MEDICINE | Admitting: INTERNAL MEDICINE
Payer: COMMERCIAL

## 2022-07-26 DIAGNOSIS — N39.0 URINARY TRACT INFECTION WITH HEMATURIA, SITE UNSPECIFIED: ICD-10-CM

## 2022-07-26 DIAGNOSIS — N20.1 URETERIC STONE: Primary | ICD-10-CM

## 2022-07-26 DIAGNOSIS — R31.9 URINARY TRACT INFECTION WITH HEMATURIA, SITE UNSPECIFIED: ICD-10-CM

## 2022-07-26 PROBLEM — Z86.69 HISTORY OF MIGRAINE: Status: ACTIVE | Noted: 2022-07-26

## 2022-07-26 PROBLEM — N13.9 OBSTRUCTIVE UROPATHY: Status: ACTIVE | Noted: 2022-07-26

## 2022-07-26 LAB
ABO/RH: NORMAL
AMORPHOUS: ABNORMAL /HPF
ANION GAP SERPL CALCULATED.3IONS-SCNC: 12 MMOL/L (ref 3–16)
ANTIBODY SCREEN: NORMAL
BACTERIA: ABNORMAL /HPF
BASOPHILS ABSOLUTE: 0.1 K/UL (ref 0–0.2)
BASOPHILS RELATIVE PERCENT: 1.1 %
BILIRUBIN URINE: NEGATIVE
BLOOD, URINE: ABNORMAL
BUN BLDV-MCNC: 10 MG/DL (ref 7–20)
CALCIUM SERPL-MCNC: 9.7 MG/DL (ref 8.3–10.6)
CHLORIDE BLD-SCNC: 103 MMOL/L (ref 99–110)
CLARITY: CLEAR
CO2: 25 MMOL/L (ref 21–32)
COLOR: YELLOW
CREAT SERPL-MCNC: 0.6 MG/DL (ref 0.6–1.1)
EOSINOPHILS ABSOLUTE: 0.2 K/UL (ref 0–0.6)
EOSINOPHILS RELATIVE PERCENT: 2.3 %
EPITHELIAL CELLS, UA: ABNORMAL /HPF (ref 0–5)
GFR AFRICAN AMERICAN: >60
GFR NON-AFRICAN AMERICAN: >60
GLUCOSE BLD-MCNC: 95 MG/DL (ref 70–99)
GLUCOSE URINE: NEGATIVE MG/DL
HCT VFR BLD CALC: 40.9 % (ref 36–48)
HEMOGLOBIN: 13.8 G/DL (ref 12–16)
KETONES, URINE: NEGATIVE MG/DL
LEUKOCYTE ESTERASE, URINE: NEGATIVE
LYMPHOCYTES ABSOLUTE: 2 K/UL (ref 1–5.1)
LYMPHOCYTES RELATIVE PERCENT: 26 %
MCH RBC QN AUTO: 28.8 PG (ref 26–34)
MCHC RBC AUTO-ENTMCNC: 33.6 G/DL (ref 31–36)
MCV RBC AUTO: 85.6 FL (ref 80–100)
MICROSCOPIC EXAMINATION: YES
MONOCYTES ABSOLUTE: 0.9 K/UL (ref 0–1.3)
MONOCYTES RELATIVE PERCENT: 12 %
NEUTROPHILS ABSOLUTE: 4.6 K/UL (ref 1.7–7.7)
NEUTROPHILS RELATIVE PERCENT: 58.6 %
NITRITE, URINE: POSITIVE
PDW BLD-RTO: 12.9 % (ref 12.4–15.4)
PH UA: 6 (ref 5–8)
PLATELET # BLD: 380 K/UL (ref 135–450)
PMV BLD AUTO: 7.7 FL (ref 5–10.5)
POTASSIUM SERPL-SCNC: 3.7 MMOL/L (ref 3.5–5.1)
PROTEIN UA: NEGATIVE MG/DL
RBC # BLD: 4.78 M/UL (ref 4–5.2)
RBC UA: ABNORMAL /HPF (ref 0–4)
SODIUM BLD-SCNC: 140 MMOL/L (ref 136–145)
SPECIFIC GRAVITY UA: 1.02 (ref 1–1.03)
URINE TYPE: ABNORMAL
UROBILINOGEN, URINE: 0.2 E.U./DL
WBC # BLD: 7.8 K/UL (ref 4–11)
WBC UA: ABNORMAL /HPF (ref 0–5)

## 2022-07-26 PROCEDURE — 96365 THER/PROPH/DIAG IV INF INIT: CPT

## 2022-07-26 PROCEDURE — 87086 URINE CULTURE/COLONY COUNT: CPT

## 2022-07-26 PROCEDURE — 2580000003 HC RX 258: Performed by: INTERNAL MEDICINE

## 2022-07-26 PROCEDURE — 1200000000 HC SEMI PRIVATE

## 2022-07-26 PROCEDURE — 80048 BASIC METABOLIC PNL TOTAL CA: CPT

## 2022-07-26 PROCEDURE — 2580000003 HC RX 258: Performed by: EMERGENCY MEDICINE

## 2022-07-26 PROCEDURE — 96366 THER/PROPH/DIAG IV INF ADDON: CPT

## 2022-07-26 PROCEDURE — 85025 COMPLETE CBC W/AUTO DIFF WBC: CPT

## 2022-07-26 PROCEDURE — 86850 RBC ANTIBODY SCREEN: CPT

## 2022-07-26 PROCEDURE — 99285 EMERGENCY DEPT VISIT HI MDM: CPT

## 2022-07-26 PROCEDURE — 96374 THER/PROPH/DIAG INJ IV PUSH: CPT

## 2022-07-26 PROCEDURE — 6360000002 HC RX W HCPCS: Performed by: INTERNAL MEDICINE

## 2022-07-26 PROCEDURE — 86901 BLOOD TYPING SEROLOGIC RH(D): CPT

## 2022-07-26 PROCEDURE — 6370000000 HC RX 637 (ALT 250 FOR IP): Performed by: INTERNAL MEDICINE

## 2022-07-26 PROCEDURE — 6360000002 HC RX W HCPCS: Performed by: EMERGENCY MEDICINE

## 2022-07-26 PROCEDURE — G0378 HOSPITAL OBSERVATION PER HR: HCPCS

## 2022-07-26 PROCEDURE — 86900 BLOOD TYPING SEROLOGIC ABO: CPT

## 2022-07-26 PROCEDURE — 81001 URINALYSIS AUTO W/SCOPE: CPT

## 2022-07-26 PROCEDURE — 96375 TX/PRO/DX INJ NEW DRUG ADDON: CPT

## 2022-07-26 PROCEDURE — 96376 TX/PRO/DX INJ SAME DRUG ADON: CPT

## 2022-07-26 RX ORDER — SODIUM CHLORIDE 9 MG/ML
INJECTION, SOLUTION INTRAVENOUS PRN
Status: DISCONTINUED | OUTPATIENT
Start: 2022-07-26 | End: 2022-07-27 | Stop reason: HOSPADM

## 2022-07-26 RX ORDER — ONDANSETRON 2 MG/ML
4 INJECTION INTRAMUSCULAR; INTRAVENOUS EVERY 6 HOURS PRN
Status: DISCONTINUED | OUTPATIENT
Start: 2022-07-26 | End: 2022-07-27 | Stop reason: HOSPADM

## 2022-07-26 RX ORDER — ONDANSETRON 2 MG/ML
4 INJECTION INTRAMUSCULAR; INTRAVENOUS ONCE
Status: COMPLETED | OUTPATIENT
Start: 2022-07-26 | End: 2022-07-26

## 2022-07-26 RX ORDER — SODIUM CHLORIDE 9 MG/ML
INJECTION, SOLUTION INTRAVENOUS CONTINUOUS
Status: DISCONTINUED | OUTPATIENT
Start: 2022-07-26 | End: 2022-07-27 | Stop reason: HOSPADM

## 2022-07-26 RX ORDER — MORPHINE SULFATE 4 MG/ML
4 INJECTION, SOLUTION INTRAMUSCULAR; INTRAVENOUS
Status: DISCONTINUED | OUTPATIENT
Start: 2022-07-26 | End: 2022-07-27 | Stop reason: HOSPADM

## 2022-07-26 RX ORDER — SODIUM CHLORIDE 0.9 % (FLUSH) 0.9 %
5-40 SYRINGE (ML) INJECTION EVERY 12 HOURS SCHEDULED
Status: DISCONTINUED | OUTPATIENT
Start: 2022-07-26 | End: 2022-07-27 | Stop reason: HOSPADM

## 2022-07-26 RX ORDER — TAMSULOSIN HYDROCHLORIDE 0.4 MG/1
0.4 CAPSULE ORAL DAILY
Status: DISCONTINUED | OUTPATIENT
Start: 2022-07-26 | End: 2022-07-27 | Stop reason: HOSPADM

## 2022-07-26 RX ORDER — KETOROLAC TROMETHAMINE 30 MG/ML
30 INJECTION, SOLUTION INTRAMUSCULAR; INTRAVENOUS ONCE
Status: COMPLETED | OUTPATIENT
Start: 2022-07-26 | End: 2022-07-26

## 2022-07-26 RX ORDER — MORPHINE SULFATE 2 MG/ML
2 INJECTION, SOLUTION INTRAMUSCULAR; INTRAVENOUS
Status: DISCONTINUED | OUTPATIENT
Start: 2022-07-26 | End: 2022-07-27 | Stop reason: HOSPADM

## 2022-07-26 RX ORDER — 0.9 % SODIUM CHLORIDE 0.9 %
1000 INTRAVENOUS SOLUTION INTRAVENOUS ONCE
Status: COMPLETED | OUTPATIENT
Start: 2022-07-26 | End: 2022-07-26

## 2022-07-26 RX ORDER — SODIUM CHLORIDE 0.9 % (FLUSH) 0.9 %
5-40 SYRINGE (ML) INJECTION PRN
Status: DISCONTINUED | OUTPATIENT
Start: 2022-07-26 | End: 2022-07-27 | Stop reason: HOSPADM

## 2022-07-26 RX ORDER — MORPHINE SULFATE 4 MG/ML
4 INJECTION, SOLUTION INTRAMUSCULAR; INTRAVENOUS ONCE
Status: COMPLETED | OUTPATIENT
Start: 2022-07-26 | End: 2022-07-26

## 2022-07-26 RX ORDER — POLYETHYLENE GLYCOL 3350 17 G/17G
17 POWDER, FOR SOLUTION ORAL DAILY PRN
Status: DISCONTINUED | OUTPATIENT
Start: 2022-07-26 | End: 2022-07-27 | Stop reason: HOSPADM

## 2022-07-26 RX ORDER — ENOXAPARIN SODIUM 100 MG/ML
40 INJECTION SUBCUTANEOUS DAILY
Status: DISCONTINUED | OUTPATIENT
Start: 2022-07-27 | End: 2022-07-27 | Stop reason: HOSPADM

## 2022-07-26 RX ORDER — ONDANSETRON 4 MG/1
4 TABLET, ORALLY DISINTEGRATING ORAL EVERY 8 HOURS PRN
Status: DISCONTINUED | OUTPATIENT
Start: 2022-07-26 | End: 2022-07-27 | Stop reason: HOSPADM

## 2022-07-26 RX ADMIN — MORPHINE SULFATE 4 MG: 4 INJECTION, SOLUTION INTRAMUSCULAR; INTRAVENOUS at 21:08

## 2022-07-26 RX ADMIN — ONDANSETRON 4 MG: 2 INJECTION INTRAMUSCULAR; INTRAVENOUS at 21:08

## 2022-07-26 RX ADMIN — ONDANSETRON 4 MG: 2 INJECTION INTRAMUSCULAR; INTRAVENOUS at 15:25

## 2022-07-26 RX ADMIN — TAMSULOSIN HYDROCHLORIDE 0.4 MG: 0.4 CAPSULE ORAL at 19:10

## 2022-07-26 RX ADMIN — Medication 10 ML: at 19:11

## 2022-07-26 RX ADMIN — SODIUM CHLORIDE: 9 INJECTION, SOLUTION INTRAVENOUS at 19:09

## 2022-07-26 RX ADMIN — CEFTRIAXONE SODIUM 1000 MG: 1 INJECTION, POWDER, FOR SOLUTION INTRAMUSCULAR; INTRAVENOUS at 16:50

## 2022-07-26 RX ADMIN — MORPHINE SULFATE 4 MG: 4 INJECTION, SOLUTION INTRAMUSCULAR; INTRAVENOUS at 15:26

## 2022-07-26 RX ADMIN — SODIUM CHLORIDE 1000 ML: 9 INJECTION, SOLUTION INTRAVENOUS at 15:26

## 2022-07-26 RX ADMIN — KETOROLAC TROMETHAMINE 30 MG: 30 INJECTION, SOLUTION INTRAMUSCULAR at 16:19

## 2022-07-26 RX ADMIN — MORPHINE SULFATE 2 MG: 2 INJECTION, SOLUTION INTRAMUSCULAR; INTRAVENOUS at 19:11

## 2022-07-26 ASSESSMENT — ENCOUNTER SYMPTOMS
EYE DISCHARGE: 0
ABDOMINAL PAIN: 1
SHORTNESS OF BREATH: 0
RHINORRHEA: 0
EYE REDNESS: 0
DIARRHEA: 0
COUGH: 0
VOMITING: 0
CHEST TIGHTNESS: 0
BACK PAIN: 0

## 2022-07-26 ASSESSMENT — PAIN DESCRIPTION - LOCATION
LOCATION: FLANK
LOCATION: ABDOMEN
LOCATION: FLANK

## 2022-07-26 ASSESSMENT — PAIN DESCRIPTION - ORIENTATION
ORIENTATION: RIGHT
ORIENTATION: RIGHT
ORIENTATION: LEFT

## 2022-07-26 ASSESSMENT — PAIN SCALES - GENERAL
PAINLEVEL_OUTOF10: 8
PAINLEVEL_OUTOF10: 6
PAINLEVEL_OUTOF10: 9
PAINLEVEL_OUTOF10: 7

## 2022-07-26 ASSESSMENT — PAIN - FUNCTIONAL ASSESSMENT: PAIN_FUNCTIONAL_ASSESSMENT: 0-10

## 2022-07-26 NOTE — ED PROVIDER NOTES
Emergency Department Provider Note  Location: [facility]  ED  7/26/2022     Patient Identification  Patsy Son is a 43 y.o. female    Chief Complaint  No chief complaint on file. HPI    (History provided by patient)    Patient is a 43 y.o. female with a significant PMHx of frequent kidney stones, endometriosis, IBS, and multiple other comorbidities as listed below, that presents the emergency department today with concerns of 7 mm stones and one 2 mm stones on the right, per patient, denting to the emergency department for admission for urological mention. She is having right flank pain, some right pelvic pain, but is otherwise not nauseated or having difficulty urinating. No chest pain or shortness of breath. No other medical concerns today in the ER. On chart review, patient has had multiple admissions for regional stone urological intervention, most recently admitted in April 2022. I have reviewed the following nursing documentation:  Allergies: Allergies   Allergen Reactions    Fentanyl Nausea And Vomiting     She reports it made her extremely sick and did not help with pain, made her feel like she wanted to die    Topamax [Topiramate] Other (See Comments)     Kidney stones    Cymbalta [Duloxetine Hcl] Other (See Comments)     moodiness       Past medical history:  has a past medical history of Anxiety, Depression, Ectopic pregnancy (2013), Endometriosis, IBS (irritable bowel syndrome), Infertility, female, Interstitial cystitis, Kidney stones, Migraines, Obesity (BMI 30.0-34.9), PCOS (polycystic ovarian syndrome), Prolonged emergence from general anesthesia, Stomach ulcer, and Urinary incontinence. Past surgical history:  has a past surgical history that includes Bladder surgery; Tonsillectomy; Ectopic pregnancy surgery (12/21/13); [place] tooth extraction; other surgical history (10/05/2017); Cystoscopy; HYSTERECTOMY ABDOMINAL (Bilateral, 3/18/2019);  Lithotripsy; Hysterectomy; Abdomen surgery; and Cystoscopy (Left, 3/20/2022). Home medications:   Prior to Admission medications    Medication Sig Start Date End Date Taking? Authorizing Provider   tamsulosin (FLOMAX) 0.4 MG capsule Take 1 capsule by mouth daily 3/21/22   Jon Ivey MD   topiramate (TOPAMAX) 25 MG tablet Take 50 mg by mouth daily     Historical Provider, MD   tamsulosin (FLOMAX) 0.4 MG capsule Take 1 capsule by mouth daily for 5 doses 2/10/22 2/15/22  PERLA Granados CNP   ibuprofen (ADVIL;MOTRIN) 600 MG tablet Take 1 tablet by mouth 3 times daily as needed for Pain  Patient not taking: Reported on 3/19/2022 2/10/22   PERLA Granados CNP   eletriptan (RELPAX) 40 MG tablet TAKE ONE-HALF TO ONE TABLET BY MOUTH ONCE AS NEEDED FOR MIGRAINE MAY REPEAT IN 2 HOURS IF NEEDED 11/5/21   Tabatha Mcneil DO   SUMAtriptan (IMITREX) 100 MG tablet Take 1 tablet by mouth once as needed for Migraine (may repeat 2nd dose within 2 hours. max 2 tabs in 24 hours.) 11/5/21 11/5/21  Tabatha Mcneil DO   propranolol (INDERAL LA) 60 MG extended release capsule Take 1 capsule by mouth daily  Patient not taking: Reported on 3/19/2022 10/14/21   Tabatha James DO   eletriptan (RELPAX) 40 MG tablet TAKE ONE-HALF TO ONE TABLET BY MOUTH ONCE AS NEEDED FOR MIGRAINE MAY REPEAT IN 2 HOURS IF NEEDED 10/19/20   Jenell Cranker, APRN - CNP   spironolactone (ALDACTONE) 25 MG tablet Take 25 mg by mouth daily  Patient not taking: Reported on 9/27/2021    Historical Provider, MD       Social history:  reports that she has never smoked. She has never used smokeless tobacco. She reports that she does not drink alcohol and does not use drugs.     Family history:    Family History   Problem Relation Age of Onset    Cancer Maternal Grandmother     Hypertension Maternal Grandmother     Elevated Lipids Maternal Grandmother     Cancer Paternal Grandmother     High Cholesterol Paternal Grandmother     Hypertension Paternal Grandmother Elevated Lipids Paternal Grandmother     Breast Cancer Paternal Grandmother     Parkinsonism Mother     Depression Mother     Migraines Father     Arthritis Father     Arthritis Maternal Uncle     Diabetes Maternal Uncle     High Blood Pressure Maternal Uncle     Arthritis Maternal Grandfather     Diabetes Maternal Grandfather     Heart Disease Maternal Grandfather     High Blood Pressure Maternal Grandfather     High Cholesterol Maternal Grandfather     High Cholesterol Paternal Grandfather     High Blood Pressure Paternal Grandfather     Cancer Paternal Grandfather         esphageal    COPD Paternal Grandfather          ROS  Review of Systems   Constitutional:  Negative for activity change, appetite change, fatigue and fever. HENT:  Negative for congestion and rhinorrhea. Eyes:  Negative for discharge and redness. Respiratory:  Negative for cough, chest tightness and shortness of breath. Cardiovascular:  Negative for chest pain and leg swelling. Gastrointestinal:  Positive for abdominal pain. Negative for diarrhea and vomiting. Genitourinary:  Positive for flank pain. Negative for dysuria and pelvic pain. Musculoskeletal:  Negative for back pain and neck pain. Skin:  Negative for rash and wound. Neurological:  Negative for dizziness, light-headedness and headaches. Exam  There were no vitals filed for this visit. Physical Exam  Vitals reviewed. Constitutional:       General: She is not in acute distress. Appearance: Normal appearance. She is not ill-appearing. HENT:      Head: Normocephalic and atraumatic. Right Ear: External ear normal.      Left Ear: External ear normal.      Nose: Nose normal. No congestion. Mouth/Throat:      Mouth: Mucous membranes are moist.      Pharynx: Oropharynx is clear. Eyes:      General:         Right eye: No discharge. Left eye: No discharge.       Conjunctiva/sclera: Conjunctivae normal.   Cardiovascular:      Rate and Rhythm: Normal rate and regular rhythm. Pulmonary:      Effort: Pulmonary effort is normal. No respiratory distress. Breath sounds: Normal breath sounds. Abdominal:      General: Abdomen is flat. There is no distension. Palpations: Abdomen is soft. Tenderness: There is abdominal tenderness (mild, right side). There is right CVA tenderness. Musculoskeletal:         General: No swelling or tenderness. Skin:     General: Skin is warm and dry. Neurological:      General: No focal deficit present. Mental Status: She is alert and oriented to person, place, and time. Psychiatric:         Mood and Affect: Mood normal.         Behavior: Behavior normal.         ED Course    ED Medication Orders (From admission, onward)      None              Radiology  No results found. Labs  No results found for this visit on 07/26/22. Procedures  Procedures    MDM  Patient seen and evaluated. Relevant records reviewed. - Patient is a 43 y.o. female with known 7 mm stones on the right, coming in emergency department at the recommendation other urologist for intervention. .  Vital stable today in the emergency department. Patient is uncomfortable, but otherwise in no acute distress. - Evaluation today in the ED revealed:    #known ureteral stone  -No repeat scans toay; known 7mm x2 stones on right  -renal function ok    #UTI   -+nitrate, WBC+, culture pending  -Ceftriaxone on board    Medications   0.9 % sodium chloride bolus (1,000 mLs IntraVENous New Bag 7/26/22 1526)   cefTRIAXone (ROCEPHIN) 1,000 mg in dextrose 5 % 50 mL IVPB mini-bag (has no administration in time range)   morphine sulfate (PF) injection 4 mg (4 mg IntraVENous Given 7/26/22 1526)   ondansetron (ZOFRAN) injection 4 mg (4 mg IntraVENous Given 7/26/22 1525)   ketorolac (TORADOL) injection 30 mg (30 mg IntraVENous Given 7/26/22 1619)     - I discussed the results, including any incidental findings, with patient. Questions answered. Patient/family agreeable to plan and express understanding of plan. Gee patient case with Silvia Long who accepts patient for admission. Disposition:  Admit to med/surg floor in fair condition. Clinical Impression:  1. Ureteric stone    2. Urinary tract infection with hematuria, site unspecified        Last menstrual period 03/14/2019, unknown if currently breastfeeding. Maria G Rojas, , am the primary attending of record and contributed the majority of evaluation and treatment of emergent care for this encounter. This chart was generated in part by using Dragon Dictation system and may contain errors related to that system including errors in grammar, punctuation, and spelling, as well as words and phrases that may be inappropriate. If there are any questions or concerns please feel free to contact the dictating provider for clarification.      BETSY ROJAS DO  [facility], DO  07/26/22 [place] [address], DO  08/03/22 [id number]

## 2022-07-26 NOTE — H&P
Hospital Medicine History & Physical      PCP: Steven Dang DO    Date of Admission: 7/26/2022    Date of Service: Pt seen/examined on 7/26/2022 and Admitted to Inpatient with expected LOS greater than two midnights due to medical therapy. Chief Complaint: Right flank pain      History Of Present Illness:    [age] [place] y.o. female who presented to [facility] with above complaints  Patient with PMH of kidney stones, presented to the ED from the urology office. Recently she was diagnosed with 2 kidney stones on the right side per patient, she has been having intermittent right flank pain with nausea no vomiting. Pain is moderate in intensity, intermittent, sharp, right flank sometimes radiating into the right groin. Denies any hematuria. Denies any subjective fevers chills. Her last episode of kidney stone was back in March on the left side that required cystoscopy and stone extraction. She reports she had a CT scan done last week with the urology group which revealed the stones in the right ureter. She was told she has 2 stones 7 mm each in the right side which she was most likely not going to pass. Past Medical History:          Diagnosis Date    Anxiety     Depression     Ectopic pregnancy 2013    Endometriosis     IBS (irritable bowel syndrome)     Infertility, female     Has seen Dr. Mariangel Welsh in past multiple IUI's natural conception this pregnancy. Interstitial cystitis     Kidney stones     x2    Migraines     Obesity (BMI 30.0-34. 9)     PCOS (polycystic ovarian syndrome)     Prolonged emergence from general anesthesia     Stomach ulcer     Urinary incontinence        Past Surgical History:          Procedure Laterality Date    ABDOMINAL SURGERY      BLADDER SURGERY      CYSTOSCOPY      lithotripsy for stone extraction    CYSTOSCOPY Left 3/20/2022    CYSTOSCOPY; LEFT URETEROSCOPY WITH STONE MANIPULATION, AND EXTRACTION performed by Jack Roger MD at [place] [address] SURGERY  12/21/13    ectopic pregnancy excision laparoscopic left linear salpingostomy    HYSTERECTOMY      HYSTERECTOMY ABDOMINAL Bilateral 3/18/2019    ROBOTIC ASSISTED TOTAL LAPAROSCOPIC HYSTERECTOMY, BILATERAL SALPINGECTION   performed by Paola Rodriguez MD at [address] [place] [address]  10/05/2017     DxLS, Evacuation of ectopic pregnancy-fimbriated portion of tube    TONSILLECTOMY      WISDOM TOOTH EXTRACTION         Medications Prior to Admission:      Prior to Admission medications    Medication Sig Start Date End Date Taking? Authorizing Provider   tamsulosin (FLOMAX) 0.4 MG capsule Take 1 capsule by mouth daily 3/21/22   Param Sanches MD   topiramate (TOPAMAX) 25 MG tablet Take 50 mg by mouth daily     Historical Provider, MD   tamsulosin (FLOMAX) 0.4 MG capsule Take 1 capsule by mouth daily for 5 doses 2/10/22 2/15/22  PERLA Mcnulty CNP   ibuprofen (ADVIL;MOTRIN) 600 MG tablet Take 1 tablet by mouth 3 times daily as needed for Pain  Patient not taking: Reported on 3/19/2022 2/10/22   PERLA Mcnulty CNP   eletriptan (RELPAX) 40 MG tablet TAKE ONE-HALF TO ONE TABLET BY MOUTH ONCE AS NEEDED FOR MIGRAINE MAY REPEAT IN 2 HOURS IF NEEDED 11/5/21   Tabatha Mcneil DO   SUMAtriptan (IMITREX) 100 MG tablet Take 1 tablet by mouth once as needed for Migraine (may repeat 2nd dose within 2 hours.  max 2 tabs in 24 hours.) 11/5/21 11/5/21  Tabatha Mcneil DO   propranolol (INDERAL LA) 60 MG extended release capsule Take 1 capsule by mouth daily  Patient not taking: Reported on 3/19/2022 10/14/21   Tabatha Mcneil DO   eletriptan (RELPAX) 40 MG tablet TAKE ONE-HALF TO ONE TABLET BY MOUTH ONCE AS NEEDED FOR MIGRAINE MAY REPEAT IN 2 HOURS IF NEEDED 10/19/20   PERLA Salazar CNP   spironolactone (ALDACTONE) 25 MG tablet Take 25 mg by mouth daily  Patient not taking: Reported on 9/27/2021    Historical Provider, MD       Allergies:  Fentanyl, Topamax [topiramate], and Cymbalta [duloxetine hcl]    Social History:      The patient currently lives at home    TOBACCO:   reports that she has never smoked. She has never used smokeless tobacco.  ETOH:   reports no history of alcohol use. E-cigarette/Vaping       Questions Responses    E-cigarette/Vaping Use Never User    Start Date     Passive Exposure     Quit Date     Counseling Given     Comments               Family History:      Reviewed and negative in regards to presenting illness/complaint. Problem Relation Age of Onset    Cancer Maternal Grandmother     Hypertension Maternal Grandmother     Elevated Lipids Maternal Grandmother     Cancer Paternal Grandmother     High Cholesterol Paternal Grandmother     Hypertension Paternal Grandmother     Elevated Lipids Paternal Grandmother     Breast Cancer Paternal Grandmother     Parkinsonism Mother     Depression Mother     Migraines Father     Arthritis Father     Arthritis Maternal Uncle     Diabetes Maternal Uncle     High Blood Pressure Maternal Uncle     Arthritis Maternal Grandfather     Diabetes Maternal Grandfather     Heart Disease Maternal Grandfather     High Blood Pressure Maternal Grandfather     High Cholesterol Maternal Grandfather     High Cholesterol Paternal Grandfather     High Blood Pressure Paternal Grandfather     Cancer Paternal Grandfather         esphageal    COPD Paternal Grandfather        REVIEW OF SYSTEMS COMPLETED:   Pertinent positives as noted in the HPI. All other systems reviewed and negative. PHYSICAL EXAM PERFORMED:    BP (!) 115/97   Pulse 67   Temp 98.7 °F (37.1 °C) (Oral)   Resp 20   Wt 200 lb (90.7 kg)   LMP 03/14/2019   SpO2 98%   BMI 30.41 kg/m²     General appearance:  No apparent distress, appears stated age and cooperative. HEENT:  Normal cephalic, atraumatic without obvious deformity. Pupils equal, round, and reactive to light. Extra ocular muscles intact. Conjunctivae/corneas clear.   Neck: Supple, with full range of motion. No jugular venous distention. Trachea midline. Respiratory:  Normal respiratory effort. Clear to auscultation, bilaterally without Rales/Wheezes/Rhonchi. Cardiovascular:  Regular rate and rhythm with normal S1/S2 without murmurs, rubs or gallops. Abdomen: Soft, non-tender, non-distended with normal bowel sounds. Musculoskeletal:  No clubbing, cyanosis or edema bilaterally. Full range of motion without deformity. Skin: Skin color, texture, turgor normal.  No rashes or lesions. Neurologic:  Neurovascularly intact without any focal sensory/motor deficits. Cranial nerves: II-XII intact, grossly non-focal.  Psychiatric:  Alert and oriented, thought content appropriate, normal insight  Capillary Refill: Brisk,3 seconds, normal  Peripheral Pulses: +2 palpable, equal bilaterally       Labs:     Recent Labs     07/26/22  1516   WBC 7.8   HGB 13.8   HCT 40.9        Recent Labs     07/26/22  1516      K 3.7      CO2 25   BUN 10   CREATININE 0.6   CALCIUM 9.7     No results for input(s): AST, ALT, BILIDIR, BILITOT, ALKPHOS in the last 72 hours. No results for input(s): INR in the last 72 hours. No results for input(s): Rhae Childes in the last 72 hours. Urinalysis:      Lab Results   Component Value Date/Time    NITRU POSITIVE 07/26/2022 03:12 PM    WBCUA 3-5 07/26/2022 03:12 PM    BACTERIA 1+ 07/26/2022 03:12 PM    RBCUA 5-10 07/26/2022 03:12 PM    BLOODU SMALL 07/26/2022 03:12 PM    SPECGRAV 1.020 07/26/2022 03:12 PM    GLUCOSEU Negative 07/26/2022 03:12 PM           Consults:    IP CONSULT TO HOSPITALIST  IP CONSULT TO UROLOGY    ASSESSMENT:PLAN:      Obstructive uropathy   Recurrent ureterolithiasis. Current episode on the right side with 2 stones 7 mm each per patient.   Unable to review latest imaging from the urology group outpatient work-up  Urology consulted  Flomax 0.4 mg daily  IV Dilaudid sliding scale for pain control  Gentle IV fluids  N.p.o. after midnight for probable cystoscopy/stent placement    History of migraine  Stable    DVT Prophylaxis: Lovenox  Diet: N.p.o. after midnight  Code Status: Full code    PT/OT Eval Status: Ambulatory    Dispo -IP stay       Nikki uBrch MD    Thank you Sarah Ballesteros DO for the opportunity to be involved in this patient's care. If you have any questions or concerns please feel free to contact me at [phone].

## 2022-07-27 VITALS
RESPIRATION RATE: 16 BRPM | HEART RATE: 64 BPM | WEIGHT: 200 LBS | TEMPERATURE: 97.9 F | DIASTOLIC BLOOD PRESSURE: 65 MMHG | BODY MASS INDEX: 30.41 KG/M2 | OXYGEN SATURATION: 96 % | SYSTOLIC BLOOD PRESSURE: 107 MMHG

## 2022-07-27 LAB
ANION GAP SERPL CALCULATED.3IONS-SCNC: 9 MMOL/L (ref 3–16)
BUN BLDV-MCNC: 11 MG/DL (ref 7–20)
CALCIUM SERPL-MCNC: 8.4 MG/DL (ref 8.3–10.6)
CHLORIDE BLD-SCNC: 106 MMOL/L (ref 99–110)
CO2: 24 MMOL/L (ref 21–32)
CREAT SERPL-MCNC: 0.6 MG/DL (ref 0.6–1.1)
GFR AFRICAN AMERICAN: >60
GFR NON-AFRICAN AMERICAN: >60
GLUCOSE BLD-MCNC: 119 MG/DL (ref 70–99)
POTASSIUM SERPL-SCNC: 4 MMOL/L (ref 3.5–5.1)
SODIUM BLD-SCNC: 139 MMOL/L (ref 136–145)

## 2022-07-27 PROCEDURE — 80048 BASIC METABOLIC PNL TOTAL CA: CPT

## 2022-07-27 PROCEDURE — 6370000000 HC RX 637 (ALT 250 FOR IP): Performed by: INTERNAL MEDICINE

## 2022-07-27 PROCEDURE — 96366 THER/PROPH/DIAG IV INF ADDON: CPT

## 2022-07-27 PROCEDURE — 36415 COLL VENOUS BLD VENIPUNCTURE: CPT

## 2022-07-27 PROCEDURE — 96376 TX/PRO/DX INJ SAME DRUG ADON: CPT

## 2022-07-27 PROCEDURE — 6360000002 HC RX W HCPCS: Performed by: INTERNAL MEDICINE

## 2022-07-27 PROCEDURE — 2580000003 HC RX 258: Performed by: INTERNAL MEDICINE

## 2022-07-27 PROCEDURE — G0378 HOSPITAL OBSERVATION PER HR: HCPCS

## 2022-07-27 RX ORDER — RIMEGEPANT SULFATE 75 MG/75MG
75 TABLET, ORALLY DISINTEGRATING ORAL PRN
COMMUNITY

## 2022-07-27 RX ORDER — PHENAZOPYRIDINE HYDROCHLORIDE 100 MG/1
200 TABLET, FILM COATED ORAL
Status: DISCONTINUED | OUTPATIENT
Start: 2022-07-27 | End: 2022-07-27 | Stop reason: HOSPADM

## 2022-07-27 RX ORDER — ACETAMINOPHEN 325 MG/1
650 TABLET ORAL EVERY 4 HOURS PRN
Status: DISCONTINUED | OUTPATIENT
Start: 2022-07-27 | End: 2022-07-27 | Stop reason: HOSPADM

## 2022-07-27 RX ORDER — SUMATRIPTAN 25 MG/1
50 TABLET, FILM COATED ORAL DAILY PRN
Status: DISCONTINUED | OUTPATIENT
Start: 2022-07-27 | End: 2022-07-27 | Stop reason: HOSPADM

## 2022-07-27 RX ADMIN — ONDANSETRON 4 MG: 2 INJECTION INTRAMUSCULAR; INTRAVENOUS at 06:20

## 2022-07-27 RX ADMIN — MORPHINE SULFATE 4 MG: 4 INJECTION, SOLUTION INTRAMUSCULAR; INTRAVENOUS at 10:46

## 2022-07-27 RX ADMIN — SUMATRIPTAN SUCCINATE 50 MG: 25 TABLET ORAL at 12:28

## 2022-07-27 RX ADMIN — SODIUM CHLORIDE: 9 INJECTION, SOLUTION INTRAVENOUS at 06:19

## 2022-07-27 RX ADMIN — CEFTRIAXONE SODIUM 1000 MG: 1 INJECTION, POWDER, FOR SOLUTION INTRAMUSCULAR; INTRAVENOUS at 10:32

## 2022-07-27 RX ADMIN — ACETAMINOPHEN 650 MG: 325 TABLET ORAL at 06:40

## 2022-07-27 RX ADMIN — MORPHINE SULFATE 4 MG: 4 INJECTION, SOLUTION INTRAMUSCULAR; INTRAVENOUS at 06:21

## 2022-07-27 ASSESSMENT — PAIN SCALES - GENERAL
PAINLEVEL_OUTOF10: 7
PAINLEVEL_OUTOF10: 4
PAINLEVEL_OUTOF10: 7

## 2022-07-27 ASSESSMENT — PAIN DESCRIPTION - LOCATION
LOCATION: HEAD
LOCATION: FLANK

## 2022-07-27 NOTE — CONSULTS
Patient:  Kory Godinez  YOB: 1985   CSN:  [id number]    Referring Provider:  No ref. provider found   Primary Care Provider:  Bryanna Arguello DO       Urology Attending Consult Note     Reason for Consultation:  nephrolithiasis, flank pain    Chief Complaint: \"right CVAT\"    HPI:  Kory Godinez is a 43 y.o. female who presented with history of severe renal colic which prompted visit to the ER. CT showed a 7mm stone in the right distal ureter. +nausea and vomiting. The patient is miserable and does not feel well. Past Medical History:   Diagnosis Date    Anxiety     Depression     Ectopic pregnancy 2013    Endometriosis     IBS (irritable bowel syndrome)     Infertility, female     Has seen Dr. Shade Gee in past multiple IUI's natural conception this pregnancy. Interstitial cystitis     Kidney stones     x2    Migraines     Obesity (BMI 30.0-34. 9)     PCOS (polycystic ovarian syndrome)     Prolonged emergence from general anesthesia     Stomach ulcer     Urinary incontinence        Past Surgical History:   Procedure Laterality Date    ABDOMINAL SURGERY      BLADDER SURGERY      CYSTOSCOPY      lithotripsy for stone extraction    CYSTOSCOPY Left 3/20/2022    CYSTOSCOPY; LEFT URETEROSCOPY WITH STONE MANIPULATION, AND EXTRACTION performed by Vero Garcia MD at [address] [place] [address]  12/21/13    ectopic pregnancy excision laparoscopic left linear salpingostomy    HYSTERECTOMY      HYSTERECTOMY ABDOMINAL Bilateral 3/18/2019    ROBOTIC ASSISTED TOTAL LAPAROSCOPIC HYSTERECTOMY, BILATERAL SALPINGECTION   performed by Cristina Noriega MD at [address] [place] [address]  10/05/2017     DxLS, Evacuation of ectopic pregnancy-fimbriated portion of tube    TONSILLECTOMY      WISDOM TOOTH EXTRACTION         Medication List reviewed:     Current Facility-Administered Medications   Medication Dose Route Frequency Provider Last Rate Last Admin acetaminophen (TYLENOL) tablet 650 mg  650 mg Oral Q4H PRN Audelia Wallace, DO   650 mg at 07/27/22 0640    phenazopyridine (PYRIDIUM) tablet 200 mg  200 mg Oral TID  Angélica Magaña MD        cefTRIAXone (ROCEPHIN) 1,000 mg in dextrose 5 % 50 mL IVPB mini-bag  1,000 mg IntraVENous Daily Angélica Magaña  mL/hr at 07/27/22 1032 1,000 mg at 07/27/22 1032    SUMAtriptan (IMITREX) tablet 50 mg  50 mg Oral Daily PRN Angélica Magaña MD        0.9 % sodium chloride infusion   IntraVENous Continuous Santosh Beltran  mL/hr at 07/27/22 0619 New Bag at 07/27/22 0619    sodium chloride flush 0.9 % injection 5-40 mL  5-40 mL IntraVENous 2 times per day Santosh Beltran MD   10 mL at 07/26/22 1911    sodium chloride flush 0.9 % injection 5-40 mL  5-40 mL IntraVENous PRN Santosh Beltran MD        0.9 % sodium chloride infusion   IntraVENous PRN Santosh Beltran MD        enoxaparin (LOVENOX) injection 40 mg  40 mg SubCUTAneous Daily Santosh Beltran MD        ondansetron (ZOFRAN-ODT) disintegrating tablet 4 mg  4 mg Oral Q8H PRN Santosh Beltran MD        Or    ondansetron (ZOFRAN) injection 4 mg  4 mg IntraVENous Q6H PRN Santosh Beltran MD   4 mg at 07/27/22 0907    polyethylene glycol (GLYCOLAX) packet 17 g  17 g Oral Daily PRN Santosh Beltran MD        morphine (PF) injection 2 mg  2 mg IntraVENous Q2H PRN Santosh Beltran MD   2 mg at 07/26/22 1911    Or    morphine sulfate (PF) injection 4 mg  4 mg IntraVENous Q2H PRN Santosh Beltran MD   4 mg at 07/27/22 1046    tamsulosin (FLOMAX) capsule 0.4 mg  0.4 mg Oral Daily Santosh Beltran MD   0.4 mg at 07/26/22 1910       Allergies   Allergen Reactions    Fentanyl Nausea And Vomiting     She reports it made her extremely sick and did not help with pain, made her feel like she wanted to die    Topamax [Topiramate] Other (See Comments)     Kidney stones    Cymbalta [Duloxetine Hcl] Other (See Comments)     moodiness Family History   Problem Relation Age of Onset    Cancer Maternal Grandmother     Hypertension Maternal Grandmother     Elevated Lipids Maternal Grandmother     Cancer Paternal Grandmother     High Cholesterol Paternal Grandmother     Hypertension Paternal Grandmother     Elevated Lipids Paternal Grandmother     Breast Cancer Paternal Grandmother     Parkinsonism Mother     Depression Mother     Migraines Father     Arthritis Father     Arthritis Maternal Uncle     Diabetes Maternal Uncle     High Blood Pressure Maternal Uncle     Arthritis Maternal Grandfather     Diabetes Maternal Grandfather     Heart Disease Maternal Grandfather     High Blood Pressure Maternal Grandfather     High Cholesterol Maternal Grandfather     High Cholesterol Paternal Grandfather     High Blood Pressure Paternal Grandfather     Cancer Paternal Grandfather         esphageal    COPD Paternal Grandfather        Social History     Tobacco Use    Smoking status: Never    Smokeless tobacco: Never   Vaping Use    Vaping Use: Never used   Substance Use Topics    Alcohol use: No    Drug use: No         Review of Systems: A 12 point ROS was performed and was unremarkable unless listed in the history of present illness.       I/O last 3 completed shifts:  In: -   Out: 950 [Urine:950]    Physical Exam:  Patient Vitals for the past 8 hrs:   BP Temp Temp src Resp SpO2   07/27/22 1030 107/65 97.9 °F (36.6 °C) Oral 16 96 %     Constitutional: pleasant patient in NAD, with a normal body habitus, in pain   EENT: pink conjunctivae, lips without cyanosis, normal appearance of ears and nose  Neck: no masses or lesions, trachea midline   Respiratory: normal respiratory movements without distress   Cardiovascular: regular rate and rhythm, lower extremities without edema   Abdomen: soft, NTND, no masses, no guarding  Back: right CVAT, no abnormal curvature of the spine  Lymphatic: no palpable cervical or supraclavicular lymphadenopathy  Skin: warm and dry, no rashes, lesions, or ulcers  Musculoskeletal: normal ROM, m. tone, no digital cyanosis, head normocephalic  Psych: normal mood and affect, alert and appropriately answers questions  : stable bladder, no urethral catheter    Labs:     No results found for: PSA  No results found for: PSA, PSADIA  Recent Labs     07/26/22  1516   WBC 7.8   HGB 13.8   HCT 40.9   MCV 85.6        Lab Results   Component Value Date    LABA1C 5.1 04/02/2021    LABA1C 5.4 11/14/2013     Lab Results   Component Value Date    LABMICR YES 07/26/2022    LDLCALC 90 11/14/2013    CREATININE 0.6 07/27/2022     Lab Results   Component Value Date     07/27/2022    K 4.0 07/27/2022     07/27/2022    CO2 24 07/27/2022    BUN 11 07/27/2022    CREATININE 0.6 07/27/2022    GLUCOSE 119 (H) 07/27/2022    CALCIUM 8.4 07/27/2022    PROT 5.6 (L) 03/20/2022    LABALBU 3.5 03/20/2022    BILITOT 0.3 03/20/2022    ALKPHOS 69 03/20/2022    AST 21 03/20/2022    ALT 21 03/20/2022    LABGLOM >60 07/27/2022    GFRAA >60 07/27/2022    AGRATIO 1.7 03/20/2022    GLOB 2.6 08/17/2021     Lab Results   Component Value Date    CREATININE 0.6 07/27/2022    CREATININE 0.6 07/26/2022    CREATININE 1.0 03/20/2022       Lab Results   Component Value Date/Time    COLORU Yellow 07/26/2022 03:12 PM    NITRU POSITIVE 07/26/2022 03:12 PM    GLUCOSEU Negative 07/26/2022 03:12 PM    KETUA Negative 07/26/2022 03:12 PM    UROBILINOGEN 0.2 07/26/2022 03:12 PM    BILIRUBINUR Negative 07/26/2022 03:12 PM    BILIRUBINUR neg 04/02/2021 04:55 PM     No active infections       Radiology:  \"Imaging was independently reviewed by myself and I agree with the radiology interpretation except as noted above\"    CT: 7mm right distal ureteral stone, renal stones also b/l     Assessment:  RIGHT hydronephrosis secondary to obstruction from ureteral calculi, as described above  Uncontrolled flank pain and nausea    UA with no WBC, 5-10 RBC, 1+ bacteria, +nitrate, - LE     Plan:   - the patient will go to OR for cystoscopy, RIGHT stent placement, possible ureteroscopy, laser lithotripsy today at our surgery center in [place] here and the patient is going on vacation this weekend and really wants intervention today if possible  -partner Dr. Michael Schmitz will do the case - Discussed that if there is pus during stent placement or the ureter seems too tight, will have to do a staged procedure with stent placement at this time and return in a few weeks for definitive stone management.      - R/B/A for surgery reviewed and include infection/injury/bleeding, secondary procedures.  -Please discharge patient-she is done this previously and gone to [facility] as such and is aware of the protocol    Discussed with nursing team who will alert the primary service     Nader Hernandez MD  [facility]  Office: [phone]

## 2022-07-27 NOTE — PROGRESS NOTES
Hospitalist Progress Note      PCP: Cristela Pryor DO    Date of Admission: 7/26/2022    Chief Complaint: R flank pain    Subjective: no new c/o. Medications:  Reviewed    Infusion Medications    sodium chloride 100 mL/hr at 07/27/22 3683    sodium chloride       Scheduled Medications    phenazopyridine  200 mg Oral TID WC    cefTRIAXone (ROCEPHIN) IV  1,000 mg IntraVENous Daily    sodium chloride flush  5-40 mL IntraVENous 2 times per day    enoxaparin  40 mg SubCUTAneous Daily    tamsulosin  0.4 mg Oral Daily     PRN Meds: acetaminophen, SUMAtriptan, sodium chloride flush, sodium chloride, ondansetron **OR** ondansetron, polyethylene glycol, morphine **OR** morphine      Intake/Output Summary (Last 24 hours) at 7/27/2022 0842  Last data filed at 7/27/2022 3662  Gross per 24 hour   Intake --   Output 950 ml   Net -950 ml       Physical Exam Performed:    /73   Pulse 64   Temp 98.3 °F (36.8 °C) (Axillary)   Resp 16   Wt 200 lb (90.7 kg)   LMP 03/14/2019   SpO2 100%   BMI 30.41 kg/m²     General appearance: No apparent distress, appears stated age and cooperative. HEENT: Pupils equal, round, and reactive to light. Conjunctivae/corneas clear. Neck: Supple, with full range of motion. No jugular venous distention. Trachea midline. Respiratory:  Normal respiratory effort. Clear to auscultation, bilaterally without Rales/Wheezes/Rhonchi. Cardiovascular: Regular rate and rhythm with normal S1/S2 without murmurs, rubs or gallops. Abdomen: Soft, non-tender, non-distended with normal bowel sounds. Musculoskeletal: No clubbing, cyanosis or edema bilaterally. Full range of motion without deformity. Skin: Skin color, texture, turgor normal.  No rashes or lesions. Neurologic:  Neurovascularly intact without any focal sensory/motor deficits.  Cranial nerves: II-XII intact, grossly non-focal.  Psychiatric: Alert and oriented, thought content appropriate, normal insight  Capillary Refill: Brisk,< 3 seconds   Peripheral Pulses: +2 palpable, equal bilaterally       Labs:   Recent Labs     07/26/22  1516   WBC 7.8   HGB 13.8   HCT 40.9        Recent Labs     07/26/22  1516 07/27/22  0610    139   K 3.7 4.0    106   CO2 25 24   BUN 10 11   CREATININE 0.6 0.6   CALCIUM 9.7 8.4     No results for input(s): AST, ALT, BILIDIR, BILITOT, ALKPHOS in the last 72 hours. No results for input(s): INR in the last 72 hours. No results for input(s): Assunta Vazquez in the last 72 hours. Urinalysis:      Lab Results   Component Value Date/Time    NITRU POSITIVE 07/26/2022 03:12 PM    WBCUA 3-5 07/26/2022 03:12 PM    BACTERIA 1+ 07/26/2022 03:12 PM    RBCUA 5-10 07/26/2022 03:12 PM    BLOODU SMALL 07/26/2022 03:12 PM    SPECGRAV 1.020 07/26/2022 03:12 PM    GLUCOSEU Negative 07/26/2022 03:12 PM       Consults:    IP CONSULT TO HOSPITALIST  IP CONSULT TO UROLOGY      Assessment/Plan:    Active Hospital Problems    Diagnosis     Obstructive uropathy [N13.9]      Priority: Medium    History of migraine [Z86.69]      Priority: Medium         Obstructive uropathy - 2nd to recurrent ureterolithiasis. Current episode on the right side with 2 stones 7 mm each per patient. Unable to review latest imaging from the urology group outpatient work-up. Urology consulted and appreciated. Started on Flomax. Continue PO/IV Narcotic analgesia as ordered. UTI - admission U/A c/w acute cystitis. Infection evidenced by U/A, Cx results and/or signs/sxs of clinical infection despite Cx results. Started on empiric Rocephin on 26 July pending Cx results. Changed to DAILY dosing. Hx Migraines - PRN Imitrex ordered. DVT Prophylaxis: LMWH     Recent Labs     07/26/22  1516        Diet: Diet NPO  Code Status: Full Code      PT/OT Eval Status: not yet ordered. Dispo - Patient is likely to remain in-house at least until Wed/Thurs 27/28 July pending clinical course and subspecialty recs.     Bozena Bolton Carla Krause MD

## 2022-07-27 NOTE — PROGRESS NOTES
Pt's IV line removed without complications. Discussed d/c instructions with patient,patient to leave [facility] and go to [facility] surgery center per Dr. Enrique Brush. Pt verbalized understanding of d/c instructions. Patient was discharged with all belongings.     Yvonne Méndez RN

## 2022-07-28 ENCOUNTER — CARE COORDINATION (OUTPATIENT)
Dept: CASE MANAGEMENT | Age: 37
End: 2022-07-28

## 2022-07-28 ENCOUNTER — TELEPHONE (OUTPATIENT)
Dept: FAMILY MEDICINE CLINIC | Age: 37
End: 2022-07-28

## 2022-07-28 ENCOUNTER — APPOINTMENT (OUTPATIENT)
Dept: CT IMAGING | Age: 37
DRG: 699 | End: 2022-07-28
Payer: COMMERCIAL

## 2022-07-28 ENCOUNTER — HOSPITAL ENCOUNTER (INPATIENT)
Age: 37
LOS: 2 days | Discharge: HOME | DRG: 699 | End: 2022-07-30
Attending: INTERNAL MEDICINE | Admitting: INTERNAL MEDICINE
Payer: COMMERCIAL

## 2022-07-28 DIAGNOSIS — R10.9 FLANK PAIN: ICD-10-CM

## 2022-07-28 DIAGNOSIS — N13.9 OBSTRUCTIVE UROPATHY: Primary | ICD-10-CM

## 2022-07-28 DIAGNOSIS — N20.0 NEPHROLITHIASIS: Primary | ICD-10-CM

## 2022-07-28 DIAGNOSIS — R31.9 HEMATURIA, UNSPECIFIED TYPE: ICD-10-CM

## 2022-07-28 PROBLEM — R65.10 SIRS (SYSTEMIC INFLAMMATORY RESPONSE SYNDROME) (HCC): Status: ACTIVE | Noted: 2022-07-28

## 2022-07-28 LAB
A/G RATIO: 1.7 (ref 1.1–2.2)
ALBUMIN SERPL-MCNC: 4.5 G/DL (ref 3.4–5)
ALP BLD-CCNC: 70 U/L (ref 40–129)
ALT SERPL-CCNC: 60 U/L (ref 10–40)
ANION GAP SERPL CALCULATED.3IONS-SCNC: 11 MMOL/L (ref 3–16)
ANION GAP SERPL CALCULATED.3IONS-SCNC: 13 MMOL/L (ref 3–16)
AST SERPL-CCNC: 30 U/L (ref 15–37)
BASOPHILS ABSOLUTE: 0 K/UL (ref 0–0.2)
BASOPHILS RELATIVE PERCENT: 0.3 %
BILIRUB SERPL-MCNC: 0.4 MG/DL (ref 0–1)
BILIRUBIN URINE: NEGATIVE
BLOOD, URINE: ABNORMAL
BUN BLDV-MCNC: 11 MG/DL (ref 7–20)
BUN BLDV-MCNC: 9 MG/DL (ref 7–20)
CALCIUM SERPL-MCNC: 8.1 MG/DL (ref 8.3–10.6)
CALCIUM SERPL-MCNC: 8.7 MG/DL (ref 8.3–10.6)
CHLORIDE BLD-SCNC: 105 MMOL/L (ref 99–110)
CHLORIDE BLD-SCNC: 105 MMOL/L (ref 99–110)
CLARITY: ABNORMAL
CO2: 20 MMOL/L (ref 21–32)
CO2: 23 MMOL/L (ref 21–32)
COLOR: YELLOW
COMMENT UA: ABNORMAL
CREAT SERPL-MCNC: 0.6 MG/DL (ref 0.6–1.1)
CREAT SERPL-MCNC: 0.7 MG/DL (ref 0.6–1.1)
EOSINOPHILS ABSOLUTE: 0 K/UL (ref 0–0.6)
EOSINOPHILS RELATIVE PERCENT: 0 %
GFR AFRICAN AMERICAN: >60
GFR AFRICAN AMERICAN: >60
GFR NON-AFRICAN AMERICAN: >60
GFR NON-AFRICAN AMERICAN: >60
GLUCOSE BLD-MCNC: 140 MG/DL (ref 70–99)
GLUCOSE BLD-MCNC: 166 MG/DL (ref 70–99)
GLUCOSE URINE: NEGATIVE MG/DL
HCT VFR BLD CALC: 40.9 % (ref 36–48)
HEMOGLOBIN: 13.8 G/DL (ref 12–16)
KETONES, URINE: NEGATIVE MG/DL
LACTIC ACID, SEPSIS: 3.5 MMOL/L (ref 0.4–1.9)
LACTIC ACID: 2.2 MMOL/L (ref 0.4–2)
LACTIC ACID: 2.3 MMOL/L (ref 0.4–2)
LEUKOCYTE ESTERASE, URINE: NEGATIVE
LYMPHOCYTES ABSOLUTE: 1.4 K/UL (ref 1–5.1)
LYMPHOCYTES RELATIVE PERCENT: 9.1 %
MCH RBC QN AUTO: 28.7 PG (ref 26–34)
MCHC RBC AUTO-ENTMCNC: 33.7 G/DL (ref 31–36)
MCV RBC AUTO: 85.2 FL (ref 80–100)
MICROSCOPIC EXAMINATION: YES
MONOCYTES ABSOLUTE: 1.3 K/UL (ref 0–1.3)
MONOCYTES RELATIVE PERCENT: 9 %
NEUTROPHILS ABSOLUTE: 12.3 K/UL (ref 1.7–7.7)
NEUTROPHILS RELATIVE PERCENT: 81.6 %
NITRITE, URINE: POSITIVE
PDW BLD-RTO: 13.3 % (ref 12.4–15.4)
PH UA: 6 (ref 5–8)
PLATELET # BLD: 375 K/UL (ref 135–450)
PMV BLD AUTO: 7.5 FL (ref 5–10.5)
POTASSIUM REFLEX MAGNESIUM: 4 MMOL/L (ref 3.5–5.1)
POTASSIUM SERPL-SCNC: 3.8 MMOL/L (ref 3.5–5.1)
PROCALCITONIN: 0.03 NG/ML (ref 0–0.15)
PROTEIN UA: 30 MG/DL
RBC # BLD: 4.8 M/UL (ref 4–5.2)
RBC UA: >100 /HPF (ref 0–4)
SODIUM BLD-SCNC: 138 MMOL/L (ref 136–145)
SODIUM BLD-SCNC: 139 MMOL/L (ref 136–145)
SPECIFIC GRAVITY UA: 1.01 (ref 1–1.03)
TOTAL PROTEIN: 7.2 G/DL (ref 6.4–8.2)
URINE CULTURE, ROUTINE: NORMAL
URINE TYPE: ABNORMAL
UROBILINOGEN, URINE: 0.2 E.U./DL
WBC # BLD: 15 K/UL (ref 4–11)
WBC UA: ABNORMAL /HPF (ref 0–5)

## 2022-07-28 PROCEDURE — 96365 THER/PROPH/DIAG IV INF INIT: CPT

## 2022-07-28 PROCEDURE — 6370000000 HC RX 637 (ALT 250 FOR IP): Performed by: PHYSICIAN ASSISTANT

## 2022-07-28 PROCEDURE — 2580000003 HC RX 258: Performed by: PHYSICIAN ASSISTANT

## 2022-07-28 PROCEDURE — 6360000002 HC RX W HCPCS: Performed by: PHYSICIAN ASSISTANT

## 2022-07-28 PROCEDURE — 99285 EMERGENCY DEPT VISIT HI MDM: CPT

## 2022-07-28 PROCEDURE — 83605 ASSAY OF LACTIC ACID: CPT

## 2022-07-28 PROCEDURE — 6360000004 HC RX CONTRAST MEDICATION: Performed by: PHYSICIAN ASSISTANT

## 2022-07-28 PROCEDURE — 2580000003 HC RX 258: Performed by: NURSE PRACTITIONER

## 2022-07-28 PROCEDURE — 36415 COLL VENOUS BLD VENIPUNCTURE: CPT

## 2022-07-28 PROCEDURE — 6370000000 HC RX 637 (ALT 250 FOR IP): Performed by: NURSE PRACTITIONER

## 2022-07-28 PROCEDURE — 96375 TX/PRO/DX INJ NEW DRUG ADDON: CPT

## 2022-07-28 PROCEDURE — 74177 CT ABD & PELVIS W/CONTRAST: CPT

## 2022-07-28 PROCEDURE — 85025 COMPLETE CBC W/AUTO DIFF WBC: CPT

## 2022-07-28 PROCEDURE — 1200000000 HC SEMI PRIVATE

## 2022-07-28 PROCEDURE — 84145 PROCALCITONIN (PCT): CPT

## 2022-07-28 PROCEDURE — 80053 COMPREHEN METABOLIC PANEL: CPT

## 2022-07-28 PROCEDURE — 96361 HYDRATE IV INFUSION ADD-ON: CPT

## 2022-07-28 PROCEDURE — 87040 BLOOD CULTURE FOR BACTERIA: CPT

## 2022-07-28 PROCEDURE — 87086 URINE CULTURE/COLONY COUNT: CPT

## 2022-07-28 PROCEDURE — 81001 URINALYSIS AUTO W/SCOPE: CPT

## 2022-07-28 RX ORDER — OXYCODONE HYDROCHLORIDE AND ACETAMINOPHEN 5; 325 MG/1; MG/1
1 TABLET ORAL EVERY 4 HOURS PRN
Status: DISCONTINUED | OUTPATIENT
Start: 2022-07-28 | End: 2022-07-30 | Stop reason: HOSPADM

## 2022-07-28 RX ORDER — MORPHINE SULFATE 4 MG/ML
4 INJECTION, SOLUTION INTRAMUSCULAR; INTRAVENOUS ONCE
Status: COMPLETED | OUTPATIENT
Start: 2022-07-28 | End: 2022-07-28

## 2022-07-28 RX ORDER — 0.9 % SODIUM CHLORIDE 0.9 %
1000 INTRAVENOUS SOLUTION INTRAVENOUS ONCE
Status: COMPLETED | OUTPATIENT
Start: 2022-07-28 | End: 2022-07-28

## 2022-07-28 RX ORDER — OXYCODONE HYDROCHLORIDE AND ACETAMINOPHEN 5; 325 MG/1; MG/1
1 TABLET ORAL ONCE
Status: COMPLETED | OUTPATIENT
Start: 2022-07-28 | End: 2022-07-28

## 2022-07-28 RX ORDER — TAMSULOSIN HYDROCHLORIDE 0.4 MG/1
0.4 CAPSULE ORAL DAILY
Status: DISCONTINUED | OUTPATIENT
Start: 2022-07-29 | End: 2022-07-30 | Stop reason: HOSPADM

## 2022-07-28 RX ORDER — KETOROLAC TROMETHAMINE 10 MG/1
10 TABLET, FILM COATED ORAL ONCE
Status: COMPLETED | OUTPATIENT
Start: 2022-07-28 | End: 2022-07-28

## 2022-07-28 RX ORDER — ONDANSETRON 2 MG/ML
4 INJECTION INTRAMUSCULAR; INTRAVENOUS ONCE
Status: COMPLETED | OUTPATIENT
Start: 2022-07-28 | End: 2022-07-28

## 2022-07-28 RX ORDER — ACETAMINOPHEN 325 MG/1
650 TABLET ORAL EVERY 6 HOURS PRN
Status: DISCONTINUED | OUTPATIENT
Start: 2022-07-28 | End: 2022-07-30 | Stop reason: HOSPADM

## 2022-07-28 RX ORDER — ENOXAPARIN SODIUM 100 MG/ML
40 INJECTION SUBCUTANEOUS DAILY
Status: DISCONTINUED | OUTPATIENT
Start: 2022-07-29 | End: 2022-07-30 | Stop reason: HOSPADM

## 2022-07-28 RX ORDER — SODIUM CHLORIDE 9 MG/ML
INJECTION, SOLUTION INTRAVENOUS CONTINUOUS
Status: DISCONTINUED | OUTPATIENT
Start: 2022-07-28 | End: 2022-07-30 | Stop reason: HOSPADM

## 2022-07-28 RX ORDER — SODIUM CHLORIDE 9 MG/ML
INJECTION, SOLUTION INTRAVENOUS PRN
Status: DISCONTINUED | OUTPATIENT
Start: 2022-07-28 | End: 2022-07-30 | Stop reason: HOSPADM

## 2022-07-28 RX ORDER — SODIUM CHLORIDE 0.9 % (FLUSH) 0.9 %
5-40 SYRINGE (ML) INJECTION EVERY 12 HOURS SCHEDULED
Status: DISCONTINUED | OUTPATIENT
Start: 2022-07-28 | End: 2022-07-30 | Stop reason: HOSPADM

## 2022-07-28 RX ORDER — SODIUM CHLORIDE 0.9 % (FLUSH) 0.9 %
5-40 SYRINGE (ML) INJECTION PRN
Status: DISCONTINUED | OUTPATIENT
Start: 2022-07-28 | End: 2022-07-30 | Stop reason: HOSPADM

## 2022-07-28 RX ORDER — KETOROLAC TROMETHAMINE 30 MG/ML
30 INJECTION, SOLUTION INTRAMUSCULAR; INTRAVENOUS EVERY 6 HOURS PRN
Status: DISCONTINUED | OUTPATIENT
Start: 2022-07-28 | End: 2022-07-30 | Stop reason: HOSPADM

## 2022-07-28 RX ADMIN — CEFTRIAXONE SODIUM 1000 MG: 1 INJECTION, POWDER, FOR SOLUTION INTRAMUSCULAR; INTRAVENOUS at 16:57

## 2022-07-28 RX ADMIN — SODIUM CHLORIDE: 9 INJECTION, SOLUTION INTRAVENOUS at 22:38

## 2022-07-28 RX ADMIN — KETOROLAC TROMETHAMINE 10 MG: 10 TABLET, FILM COATED ORAL at 18:49

## 2022-07-28 RX ADMIN — MORPHINE SULFATE 4 MG: 4 INJECTION, SOLUTION INTRAMUSCULAR; INTRAVENOUS at 16:09

## 2022-07-28 RX ADMIN — OXYCODONE AND ACETAMINOPHEN 1 TABLET: 5; 325 TABLET ORAL at 18:49

## 2022-07-28 RX ADMIN — OXYCODONE AND ACETAMINOPHEN 1 TABLET: 5; 325 TABLET ORAL at 22:33

## 2022-07-28 RX ADMIN — ONDANSETRON 4 MG: 2 INJECTION INTRAMUSCULAR; INTRAVENOUS at 16:09

## 2022-07-28 RX ADMIN — SODIUM CHLORIDE 1000 ML: 9 INJECTION, SOLUTION INTRAVENOUS at 20:34

## 2022-07-28 RX ADMIN — IOPAMIDOL 75 ML: 755 INJECTION, SOLUTION INTRAVENOUS at 17:43

## 2022-07-28 RX ADMIN — Medication 10 ML: at 22:35

## 2022-07-28 RX ADMIN — SODIUM CHLORIDE 1000 ML: 9 INJECTION, SOLUTION INTRAVENOUS at 16:08

## 2022-07-28 ASSESSMENT — PAIN SCALES - GENERAL
PAINLEVEL_OUTOF10: 8
PAINLEVEL_OUTOF10: 9
PAINLEVEL_OUTOF10: 8
PAINLEVEL_OUTOF10: 9
PAINLEVEL_OUTOF10: 7

## 2022-07-28 ASSESSMENT — PAIN DESCRIPTION - LOCATION
LOCATION: ABDOMEN
LOCATION: ABDOMEN;FLANK
LOCATION: FLANK

## 2022-07-28 ASSESSMENT — PAIN DESCRIPTION - ONSET: ONSET: ON-GOING

## 2022-07-28 ASSESSMENT — PAIN DESCRIPTION - FREQUENCY: FREQUENCY: CONTINUOUS

## 2022-07-28 ASSESSMENT — PAIN - FUNCTIONAL ASSESSMENT: PAIN_FUNCTIONAL_ASSESSMENT: 0-10

## 2022-07-28 ASSESSMENT — PAIN DESCRIPTION - PAIN TYPE: TYPE: ACUTE PAIN

## 2022-07-28 ASSESSMENT — PAIN DESCRIPTION - DESCRIPTORS: DESCRIPTORS: ACHING

## 2022-07-28 ASSESSMENT — PAIN DESCRIPTION - ORIENTATION: ORIENTATION: RIGHT

## 2022-07-28 NOTE — CARE COORDINATION
[facility] Transitions Initial Follow Up Call    Call within 2 business days of discharge: Yes    Patient: Ian Julian Patient : 1985   MRN: [id number]  Reason for Admission: kidney stone with stent placement   Discharge Date: 22 RARS: Readmission Risk Score: 4      Last Discharge [facility]       Date Complaint Diagnosis Description Type Department Provider    22 Flank Pain Ureteric stone . .. ED to Hosp-Admission (Discharged) (ADMITTED) Larissa Hunt MD; Shayna Castorena. .. Spoke with: Hailey 399: [facility]     Non-face-to-face services provided:  Obtained and reviewed discharge summary and/or continuity of care documents  Education of patient/family/caregiver/guardian to support self-management-.    Transitions of Care Initial Call    Was this an external facility discharge? No Discharge Facility: n/a    Challenges to be reviewed by the provider   Additional needs identified to be addressed with provider: No  none             Method of communication with provider : none    Advance Care Planning:   Does patient have an Advance Directive: not on file. Care Transition Nurse contacted the patient by telephone to perform post hospital discharge assessment. Verified name and  with patient as identifiers. Provided introduction to self, and explanation of the CTN role. CTN reviewed discharge instructions, medical action plan and red flags with patient who verbalized understanding. Patient given an opportunity to ask questions and does not have any further questions or concerns at this time. Were discharge instructions available to patient? Yes. Reviewed appropriate site of care based on symptoms and resources available to patient including: PCP  Specialist  When to call 911. The patient agrees to contact the PCP office for questions related to their healthcare.      Medication reconciliation was performed with patient, who verbalizes understanding of administration of home medications. Was patient discharged with a pulse oximeter? no    CTN provided contact information. Plan for follow-up call in 5-7 days based on severity of symptoms and risk factors. Plan for next call: symptom management-.  self management-.  follow up appointment-.  medication management-. CTN spoke with patient who reported she is still having some discomfort with urinary stent in place. Patient reported she is urinating and taking percocet to help with pain. Patient encouraged to increase fluids. Patient encouraged to notify urologist if unable to urinate, worsening pain, fever, or n/v. CTN reviewed medications and reports taking all as prescribed. Patient denied any further needs at this time. Patient reported PCP office called today and no apt needed. CTN encouraged patient to reach out to urologist to setup apt. Patient reported she was instructed to remove urinary stent on Monday. Care Transitions 24 Hour Call    Do you have a copy of your discharge instructions?: Yes  Do you have all of your prescriptions and are they filled?: Yes  Have you been contacted by a [address]?: No  Have you scheduled your follow up appointment?: No  Do you feel like you have everything you need to keep you well at home?: Yes  Care Transitions Interventions         Follow Up  No future appointments.     Trevor SUAREZ, RN, [facility]  Care Transition Nurse  [phone] mobile

## 2022-07-28 NOTE — ED NOTES
Pt informed tech she can not give a urine sample until she gets more pain medication.       Deanna Wise RN  07/28/22 6656

## 2022-07-28 NOTE — DISCHARGE SUMMARY
Hospital Medicine Discharge Summary    Patient ID: Zoie Ochoa      Patient's PCP: Nadeen Gray DO    Admit Date: 7/26/2022     Discharge Date: 7/27/2022      Admitting Physician: Abdias Vargas MD     Discharge Physician: Daniel Domingo MD     Discharge Diagnoses: Active Hospital Problems    Diagnosis     Obstructive uropathy [N13.9]      Priority: Medium    History of migraine [Z86.69]      Priority: Medium       The patient was seen and examined on day of discharge and this discharge summary is in conjunction with any daily progress note from day of discharge. Hospital Course:            Obstructive uropathy - 2nd to recurrent ureterolithiasis. Current episode on the right side with 2 stones 7 mm each per patient. Unable to review latest imaging from the urology group outpatient work-up. Urology consulted and appreciated - discharged for close interval procedure at Urology center w/out OR time available at [facility]. Started on Flomax. Continue PO/IV Narcotic analgesia as ordered. UTI - admission U/A c/w acute cystitis. Infection evidenced by U/A, Cx results and/or signs/sxs of clinical infection despite Cx results. Started on empiric Rocephin on 26 July pending Cx results. Changed to DAILY dosing and given prior to discharge 27 July. Hx Migraines - PRN Imitrex ordered. Labs:  For convenience and continuity at follow-up the following most recent labs are provided:      CBC:    Lab Results   Component Value Date/Time    WBC 7.8 07/26/2022 03:16 PM    HGB 13.8 07/26/2022 03:16 PM    HCT 40.9 07/26/2022 03:16 PM     07/26/2022 03:16 PM       Renal:    Lab Results   Component Value Date/Time     07/27/2022 06:10 AM    K 4.0 07/27/2022 06:10 AM    K 3.9 03/20/2022 06:09 AM     07/27/2022 06:10 AM    CO2 24 07/27/2022 06:10 AM    BUN 11 07/27/2022 06:10 AM    CREATININE 0.6 07/27/2022 06:10 AM    CALCIUM 8.4 07/27/2022 06:10 AM    PHOS 2.9 08/19/2012

## 2022-07-28 NOTE — ED NOTES
Pt asked if she is being admitted, pt informed she is not marked as a potential admission at this time.  Pt stated \"I really hope you guys admit me for pain control\"      Christiano Samayoa RN  07/28/22 8086

## 2022-07-28 NOTE — ED PROVIDER NOTES
[facility] Emergency Department    CHIEF COMPLAINT  Post-op Problem (Ureter stent placed yesterday by [facility]. Pain and fever this morning, sent by Dr Adrienne Layne for admission.)      [address] [facility]  I have seen and evaluated this patient with my supervising physician, Dr. Benedicto Reyna. HISTORY OF PRESENT ILLNESS  Debra Martinez is a 43 y.o. female who presents to the ED complaining of 1 day history of increased flank pain. Patient recently seen and admitted here for 7 mm kidney stone with questionable infectious process. She was placed on antibiotics and discharged home yesterday with 7 mm kidney stone removed outpatient via stent which is still in place. Reports that today began having worsening pain and chilling. States that she has nauseous. Denies vomiting. No diarrhea or constipation. Denies vaginal bleeding, pain or discharge. Pain 8-9 out of 10. She denies chest pain. No headaches, lightheadedness, dizziness confusion. No other complaints, modifying factors or associated symptoms. Nursing notes reviewed. Past Medical History:   Diagnosis Date    Anxiety     Depression     Ectopic pregnancy 2013    Endometriosis     IBS (irritable bowel syndrome)     Infertility, female     Has seen Dr. Angel Barfield in past multiple IUI's natural conception this pregnancy. Interstitial cystitis     Kidney stones     x2    Migraines     Obesity (BMI 30.0-34. 9)     PCOS (polycystic ovarian syndrome)     Prolonged emergence from general anesthesia     Stomach ulcer     Urinary incontinence      Past Surgical History:   Procedure Laterality Date    ABDOMINAL SURGERY      BLADDER SURGERY      CYSTOSCOPY      lithotripsy for stone extraction    CYSTOSCOPY Left 3/20/2022    CYSTOSCOPY; LEFT URETEROSCOPY WITH STONE MANIPULATION, AND EXTRACTION performed by Tristan Young MD at [address] [place] [address]  12/21/13    ectopic pregnancy excision laparoscopic left linear salpingostomy    HYSTERECTOMY      HYSTERECTOMY ABDOMINAL Bilateral 3/18/2019    ROBOTIC ASSISTED TOTAL LAPAROSCOPIC HYSTERECTOMY, BILATERAL SALPINGECTION   performed by Kristy Simpson MD at [address]  10/05/2017     DxLS, Evacuation of ectopic pregnancy-fimbriated portion of tube    TONSILLECTOMY      WISDOM TOOTH EXTRACTION       Family History   Problem Relation Age of Onset    Cancer Maternal Grandmother     Hypertension Maternal Grandmother     Elevated Lipids Maternal Grandmother     Cancer Paternal Grandmother     High Cholesterol Paternal Grandmother     Hypertension Paternal Grandmother     Elevated Lipids Paternal Grandmother     Breast Cancer Paternal Grandmother     Parkinsonism Mother     Depression Mother     Migraines Father     Arthritis Father     Arthritis Maternal Uncle     Diabetes Maternal Uncle     High Blood Pressure Maternal Uncle     Arthritis Maternal Grandfather     Diabetes Maternal Grandfather     Heart Disease Maternal Grandfather     High Blood Pressure Maternal Grandfather     High Cholesterol Maternal Grandfather     High Cholesterol Paternal Grandfather     High Blood Pressure Paternal Grandfather     Cancer Paternal Grandfather         esphageal    COPD Paternal Grandfather      Social History     Socioeconomic History    Marital status:      Spouse name: Not on file    Number of children: Not on file    Years of education: Not on file    Highest education level: Not on file   Occupational History    Not on file   Tobacco Use    Smoking status: Never    Smokeless tobacco: Never   Vaping Use    Vaping Use: Never used   Substance and Sexual Activity    Alcohol use: No    Drug use: No    Sexual activity: Yes     Partners: Male   Other Topics Concern    Not on file   Social History Narrative    Not on file     Social Determinants of Health     Financial Resource Strain: Low Risk     Difficulty of Paying Living Expenses: Not condition->Emergency Medical Condition (MA) Reason for Exam: Flank pain, rt sided, S/P uretal stent yesterday right side Additional signs and symptoms: Fever, Nausea Relevant Medical/Surgical History: IBS, Kidney stones, Ureteral stent yesterday right side, hyst, bladder sx FINDINGS: Lower Chest: Minimal atelectatic changes left lower lobe. Organs: The liver demonstrates fatty infiltration but no focal disease. The gallbladder, pancreas and spleen, adrenals, aorta and IVC appear normal.  The left kidney demonstrates minimal nephrolithiasis but no obstructive uropathy. The right kidney demonstrates a ureteric stent. The upper portion of the stent is in the distal right pelvis. Right nephrolithiasis is redemonstrated with a lower pole stone measuring 7.9 mm. GI/Bowel: No evidence of bowel obstruction or perforation. No evidence of acute diverticulitis. Pelvis: The ureteric stent terminates in the urinary bladder in adequate position. Status post hysterectomy. No adnexal abnormality. No evidence of focal urinary bladder thickening. Peritoneum/Retroperitoneum: No evidence of retroperitoneal lymphadenopathy or acute peritoneal findings. Mild fat containing periumbilical hernia. Bones/Soft Tissues: No acute abnormality. 1. Redemonstration of bilateral nephrolithiasis with the largest stone in the lower pole of the right kidney measuring 7.9 mm. Well-positioned right ureteric stent. 2. Fatty liver but no focal disease. No evidence of gallbladder disease. 3. No acute gastrointestinal abnormality. Is this patient to be included in the SEP-1 Core Measure due to severe sepsis or septic shock? No   Exclusion criteria - the patient is NOT to be included for SEP-1 Core Measure due to: Alternative explanation for abnormal labs/vitals that do not relate to sepsis, see MDM for further explanation    ED COURSE  Patient received morphine and Zofran IV for pain, without good relief.   Triage vitals showing tachycardia pulse rate 130s. Given a 1 L IV fluid bolus. CBC shows leukocytosis at 15. 0.lactate 2.2. Blood cultures x2 pending. Patient given dose of Rocephin. Procalcitonin of 0.03. Patient is refusing to urinate until pain is under control. Attempted to give her dose of Percocet and Toradol. She reports that the only thing that works for her is Dilaudid although took the medication anyway. Continued refusing to urinate until she received Dilaudid. Had conversation with urology and we are still waiting on a urine sample at this time. Patient called urology twice while here in the emergency department as we were not addressing her pain quickly enough. Urology recommends admission for pain control and/or IV antibiotics and will plan to pull stent tomorrow. Patient did finally provide urine sample. Intrarenal stones noted nonobstructing. No evidence for complications from recent stent placement. Repeat lactic now 2.3. Suspect this may be from patient's decreased ability to eat and drink secondary to nausea and pain. Feel that leukocytosis may be secondary to recent instrumentation given negative procalcitonin. Urinalysis was nitrite positive although there were over 100 red blood cells present. Urine cultures are pending. Discussed case with hospital medicine regarding admission. A discussion was had with Ms. Kelin Lisa regarding flank pain, ED findings and recommendations for admission. Risk management discussed and shared decision making had with patient and/or surrogate. All questions were answered. Patient in agreement. CRITICAL CARE TIME  30 Minutes of critical care time spent not including separately billable procedures.     MDM  Results for orders placed or performed during the hospital encounter of 07/28/22   CBC with Auto Differential   Result Value Ref Range    WBC 15.0 (H) 4.0 - 11.0 K/uL    RBC 4.80 4.00 - 5.20 M/uL    Hemoglobin 13.8 12.0 - 16.0 g/dL    Hematocrit 40.9 36.0 - 48.0 % MCV 85.2 80.0 - 100.0 fL    MCH 28.7 26.0 - 34.0 pg    MCHC 33.7 31.0 - 36.0 g/dL    RDW 13.3 12.4 - 15.4 %    Platelets 836 324 - 416 K/uL    MPV 7.5 5.0 - 10.5 fL    Neutrophils % 81.6 %    Lymphocytes % 9.1 %    Monocytes % 9.0 %    Eosinophils % 0.0 %    Basophils % 0.3 %    Neutrophils Absolute 12.3 (H) 1.7 - 7.7 K/uL    Lymphocytes Absolute 1.4 1.0 - 5.1 K/uL    Monocytes Absolute 1.3 0.0 - 1.3 K/uL    Eosinophils Absolute 0.0 0.0 - 0.6 K/uL    Basophils Absolute 0.0 0.0 - 0.2 K/uL   Comprehensive Metabolic Panel   Result Value Ref Range    Sodium 138 136 - 145 mmol/L    Potassium 3.8 3.5 - 5.1 mmol/L    Chloride 105 99 - 110 mmol/L    CO2 20 (L) 21 - 32 mmol/L    Anion Gap 13 3 - 16    Glucose 140 (H) 70 - 99 mg/dL    BUN 11 7 - 20 mg/dL    Creatinine 0.6 0.6 - 1.1 mg/dL    GFR Non-African American >60 >60    GFR African American >60 >60    Calcium 8.7 8.3 - 10.6 mg/dL    Total Protein 7.2 6.4 - 8.2 g/dL    Albumin 4.5 3.4 - 5.0 g/dL    Albumin/Globulin Ratio 1.7 1.1 - 2.2    Total Bilirubin 0.4 0.0 - 1.0 mg/dL    Alkaline Phosphatase 70 40 - 129 U/L    ALT 60 (H) 10 - 40 U/L    AST 30 15 - 37 U/L   Lactic Acid   Result Value Ref Range    Lactic Acid 2.2 (H) 0.4 - 2.0 mmol/L   Urinalysis   Result Value Ref Range    Color, UA Yellow Straw/Yellow    Clarity, UA CLOUDY (A) Clear    Glucose, Ur Negative Negative mg/dL    Bilirubin Urine Negative Negative    Ketones, Urine Negative Negative mg/dL    Specific Gravity, UA 1.015 1.005 - 1.030    Blood, Urine MODERATE (A) Negative    pH, UA 6.0 5.0 - 8.0    Protein, UA 30 (A) Negative mg/dL    Urobilinogen, Urine 0.2 <2.0 E.U./dL    Nitrite, Urine POSITIVE (A) Negative    Leukocyte Esterase, Urine Negative Negative    Microscopic Examination YES     Urine Type NotGiven    Procalcitonin   Result Value Ref Range    Procalcitonin 0.03 0.00 - 0.15 ng/mL   Lactic Acid   Result Value Ref Range    Lactic Acid 2.3 (H) 0.4 - 2.0 mmol/L   Microscopic Urinalysis   Result Value Ref Range    WBC, UA see below (A) 0 - 5 /HPF    RBC, UA >100 (A) 0 - 4 /HPF    Urinalysis Comments see below      I spoke with Petar Miles, and CHEPE Jimenez. We thoroughly discussed the history, physical exam, laboratory and imaging studies, as well as, emergency department course. Based upon that discussion, we've decided to admit Solo Lynch to the hospital for further observation, evaluation, and treatment. Final Impression  1. Flank pain    2. Hematuria, unspecified type      Blood pressure 113/71, pulse 65, temperature 97.9 °F (36.6 °C), resp. rate 18, height 5' 8\" (1.727 m), weight 200 lb (90.7 kg), last menstrual period 03/14/2019, SpO2 97 %, unknown if currently breastfeeding. DISPOSITION  Patient was admitted to the hospital in stable condition.           [facility], [place]  07/28/22 7568

## 2022-07-28 NOTE — ED NOTES
Tech requested urine from pt again, pt stated she would go if pain medications were waiting for her when she comes back to room, tech informed her she would let provider know but cant guarantee what he will order. Pt then stated she was going to call urology herself.        Tristan Valentin RN  07/28/22 192

## 2022-07-28 NOTE — TELEPHONE ENCOUNTER
[facility] Transitions Initial Follow Up Call    Outreach made within 2 business days of discharge: Yes    Patient: Ian Julian Patient : 1985   MRN: [id number]  Reason for Admission: There are no discharge diagnoses documented for the most recent discharge. Discharge Date: 22       Spoke with: Ella for patient to return call    Discharge department/facility: [facility]    Non-face-to-face services provided:  Obtained and reviewed discharge summary and/or continuity of care documents    TCM Interactive Patient Contact:  Was patient able to fill all prescriptions: Yes  Was patient instructed to bring all medications to the follow-up visit: Yes  Is patient taking all medications as directed in the discharge summary? Yes  Does patient understand their discharge instructions: Yes  Does patient have questions or concerns that need addressed prior to 7-14 day follow up office visit: no    Scheduled appointment with PCP within 7-14 days    Follow Up  No future appointments.     Patrick Luis RN

## 2022-07-28 NOTE — ED NOTES
Pt informed PA ordered percocet and toradol, pt stated \"is it IV\" pt informed meds are by mouth. Pt stated \"you've got to be kidding me, you must not want me to pee that bad. I told them that's what I am taking at home and it doesn't help. Also you guys dont clean very well, there is blood on this cord. \" pt handed medications and informed Magali VALDEZ will be in to update her pt stated \"he must not of ever had a kidney stone before\"      Celso Das, RN  07/28/22 5215

## 2022-07-28 NOTE — ED TRIAGE NOTES
Stent placed in right kidney [facility] placed [facility] yesterday. Pain and fever starting this morning. Pt took Percocet at 1230 for pain.

## 2022-07-29 ENCOUNTER — ANESTHESIA (OUTPATIENT)
Dept: OPERATING ROOM | Age: 37
DRG: 699 | End: 2022-07-29
Payer: COMMERCIAL

## 2022-07-29 ENCOUNTER — ANESTHESIA EVENT (OUTPATIENT)
Dept: OPERATING ROOM | Age: 37
DRG: 699 | End: 2022-07-29
Payer: COMMERCIAL

## 2022-07-29 LAB
BASOPHILS ABSOLUTE: 0 K/UL (ref 0–0.2)
BASOPHILS RELATIVE PERCENT: 0.2 %
EOSINOPHILS ABSOLUTE: 0.1 K/UL (ref 0–0.6)
EOSINOPHILS RELATIVE PERCENT: 1 %
HCT VFR BLD CALC: 35 % (ref 36–48)
HEMOGLOBIN: 11.8 G/DL (ref 12–16)
LACTIC ACID, SEPSIS: 2.4 MMOL/L (ref 0.4–1.9)
LYMPHOCYTES ABSOLUTE: 2.3 K/UL (ref 1–5.1)
LYMPHOCYTES RELATIVE PERCENT: 26.8 %
MCH RBC QN AUTO: 29.1 PG (ref 26–34)
MCHC RBC AUTO-ENTMCNC: 33.7 G/DL (ref 31–36)
MCV RBC AUTO: 86.3 FL (ref 80–100)
MONOCYTES ABSOLUTE: 0.8 K/UL (ref 0–1.3)
MONOCYTES RELATIVE PERCENT: 9.4 %
NEUTROPHILS ABSOLUTE: 5.4 K/UL (ref 1.7–7.7)
NEUTROPHILS RELATIVE PERCENT: 62.6 %
PDW BLD-RTO: 13.3 % (ref 12.4–15.4)
PLATELET # BLD: 288 K/UL (ref 135–450)
PMV BLD AUTO: 7.6 FL (ref 5–10.5)
RBC # BLD: 4.05 M/UL (ref 4–5.2)
URINE CULTURE, ROUTINE: NORMAL
WBC # BLD: 8.7 K/UL (ref 4–11)

## 2022-07-29 PROCEDURE — 2709999900 HC NON-CHARGEABLE SUPPLY: Performed by: UROLOGY

## 2022-07-29 PROCEDURE — 6360000002 HC RX W HCPCS: Performed by: ANESTHESIOLOGY

## 2022-07-29 PROCEDURE — 6360000002 HC RX W HCPCS: Performed by: NURSE ANESTHETIST, CERTIFIED REGISTERED

## 2022-07-29 PROCEDURE — 83605 ASSAY OF LACTIC ACID: CPT

## 2022-07-29 PROCEDURE — 3600000013 HC SURGERY LEVEL 3 ADDTL 15MIN: Performed by: UROLOGY

## 2022-07-29 PROCEDURE — 6370000000 HC RX 637 (ALT 250 FOR IP): Performed by: UROLOGY

## 2022-07-29 PROCEDURE — 7100000011 HC PHASE II RECOVERY - ADDTL 15 MIN: Performed by: UROLOGY

## 2022-07-29 PROCEDURE — 2580000003 HC RX 258: Performed by: NURSE ANESTHETIST, CERTIFIED REGISTERED

## 2022-07-29 PROCEDURE — 6360000002 HC RX W HCPCS: Performed by: INTERNAL MEDICINE

## 2022-07-29 PROCEDURE — 2580000003 HC RX 258: Performed by: UROLOGY

## 2022-07-29 PROCEDURE — 85025 COMPLETE CBC W/AUTO DIFF WBC: CPT

## 2022-07-29 PROCEDURE — 3600000003 HC SURGERY LEVEL 3 BASE: Performed by: UROLOGY

## 2022-07-29 PROCEDURE — 7100000010 HC PHASE II RECOVERY - FIRST 15 MIN: Performed by: UROLOGY

## 2022-07-29 PROCEDURE — 0TP98DZ REMOVAL OF INTRALUMINAL DEVICE FROM URETER, VIA NATURAL OR ARTIFICIAL OPENING ENDOSCOPIC: ICD-10-PCS | Performed by: UROLOGY

## 2022-07-29 PROCEDURE — 2580000003 HC RX 258: Performed by: NURSE PRACTITIONER

## 2022-07-29 PROCEDURE — 6370000000 HC RX 637 (ALT 250 FOR IP): Performed by: NURSE PRACTITIONER

## 2022-07-29 PROCEDURE — 2500000003 HC RX 250 WO HCPCS: Performed by: NURSE ANESTHETIST, CERTIFIED REGISTERED

## 2022-07-29 PROCEDURE — 6360000002 HC RX W HCPCS: Performed by: NURSE PRACTITIONER

## 2022-07-29 PROCEDURE — 1200000000 HC SEMI PRIVATE

## 2022-07-29 PROCEDURE — 3700000000 HC ANESTHESIA ATTENDED CARE: Performed by: UROLOGY

## 2022-07-29 PROCEDURE — 2580000003 HC RX 258: Performed by: INTERNAL MEDICINE

## 2022-07-29 PROCEDURE — 36415 COLL VENOUS BLD VENIPUNCTURE: CPT

## 2022-07-29 PROCEDURE — 3700000001 HC ADD 15 MINUTES (ANESTHESIA): Performed by: UROLOGY

## 2022-07-29 RX ORDER — ONDANSETRON 2 MG/ML
INJECTION INTRAMUSCULAR; INTRAVENOUS PRN
Status: DISCONTINUED | OUTPATIENT
Start: 2022-07-29 | End: 2022-07-29 | Stop reason: SDUPTHER

## 2022-07-29 RX ORDER — SODIUM CHLORIDE, SODIUM LACTATE, POTASSIUM CHLORIDE, CALCIUM CHLORIDE 600; 310; 30; 20 MG/100ML; MG/100ML; MG/100ML; MG/100ML
INJECTION, SOLUTION INTRAVENOUS CONTINUOUS PRN
Status: DISCONTINUED | OUTPATIENT
Start: 2022-07-29 | End: 2022-07-29 | Stop reason: SDUPTHER

## 2022-07-29 RX ORDER — SODIUM CHLORIDE 0.9 % (FLUSH) 0.9 %
5-40 SYRINGE (ML) INJECTION PRN
Status: DISCONTINUED | OUTPATIENT
Start: 2022-07-29 | End: 2022-07-29 | Stop reason: HOSPADM

## 2022-07-29 RX ORDER — MAGNESIUM HYDROXIDE 1200 MG/15ML
LIQUID ORAL PRN
Status: DISCONTINUED | OUTPATIENT
Start: 2022-07-29 | End: 2022-07-29 | Stop reason: ALTCHOICE

## 2022-07-29 RX ORDER — ONDANSETRON 2 MG/ML
4 INJECTION INTRAMUSCULAR; INTRAVENOUS
Status: DISCONTINUED | OUTPATIENT
Start: 2022-07-29 | End: 2022-07-29 | Stop reason: HOSPADM

## 2022-07-29 RX ORDER — KETOROLAC TROMETHAMINE 30 MG/ML
INJECTION, SOLUTION INTRAMUSCULAR; INTRAVENOUS PRN
Status: DISCONTINUED | OUTPATIENT
Start: 2022-07-29 | End: 2022-07-29 | Stop reason: SDUPTHER

## 2022-07-29 RX ORDER — LABETALOL HYDROCHLORIDE 5 MG/ML
10 INJECTION, SOLUTION INTRAVENOUS
Status: DISCONTINUED | OUTPATIENT
Start: 2022-07-29 | End: 2022-07-29 | Stop reason: HOSPADM

## 2022-07-29 RX ORDER — OXYCODONE HYDROCHLORIDE 5 MG/1
10 TABLET ORAL PRN
Status: DISCONTINUED | OUTPATIENT
Start: 2022-07-29 | End: 2022-07-29 | Stop reason: HOSPADM

## 2022-07-29 RX ORDER — DIPHENHYDRAMINE HYDROCHLORIDE 50 MG/ML
12.5 INJECTION INTRAMUSCULAR; INTRAVENOUS
Status: DISCONTINUED | OUTPATIENT
Start: 2022-07-29 | End: 2022-07-29 | Stop reason: HOSPADM

## 2022-07-29 RX ORDER — LIDOCAINE HYDROCHLORIDE 20 MG/ML
JELLY TOPICAL PRN
Status: DISCONTINUED | OUTPATIENT
Start: 2022-07-29 | End: 2022-07-29 | Stop reason: ALTCHOICE

## 2022-07-29 RX ORDER — OXYCODONE HYDROCHLORIDE 5 MG/1
5 TABLET ORAL PRN
Status: DISCONTINUED | OUTPATIENT
Start: 2022-07-29 | End: 2022-07-29 | Stop reason: HOSPADM

## 2022-07-29 RX ORDER — SODIUM CHLORIDE 9 MG/ML
INJECTION, SOLUTION INTRAVENOUS CONTINUOUS
Status: ACTIVE | OUTPATIENT
Start: 2022-07-29 | End: 2022-07-29

## 2022-07-29 RX ORDER — PROPOFOL 10 MG/ML
INJECTION, EMULSION INTRAVENOUS PRN
Status: DISCONTINUED | OUTPATIENT
Start: 2022-07-29 | End: 2022-07-29 | Stop reason: SDUPTHER

## 2022-07-29 RX ORDER — SODIUM CHLORIDE 9 MG/ML
25 INJECTION, SOLUTION INTRAVENOUS PRN
Status: DISCONTINUED | OUTPATIENT
Start: 2022-07-29 | End: 2022-07-29 | Stop reason: HOSPADM

## 2022-07-29 RX ORDER — SODIUM CHLORIDE 0.9 % (FLUSH) 0.9 %
5-40 SYRINGE (ML) INJECTION EVERY 12 HOURS SCHEDULED
Status: DISCONTINUED | OUTPATIENT
Start: 2022-07-29 | End: 2022-07-29 | Stop reason: HOSPADM

## 2022-07-29 RX ORDER — MORPHINE SULFATE 2 MG/ML
2 INJECTION, SOLUTION INTRAMUSCULAR; INTRAVENOUS ONCE
Status: COMPLETED | OUTPATIENT
Start: 2022-07-29 | End: 2022-07-29

## 2022-07-29 RX ORDER — LIDOCAINE HYDROCHLORIDE 20 MG/ML
INJECTION, SOLUTION INFILTRATION; PERINEURAL PRN
Status: DISCONTINUED | OUTPATIENT
Start: 2022-07-29 | End: 2022-07-29 | Stop reason: SDUPTHER

## 2022-07-29 RX ORDER — HYDROMORPHONE HCL 110MG/55ML
PATIENT CONTROLLED ANALGESIA SYRINGE INTRAVENOUS PRN
Status: DISCONTINUED | OUTPATIENT
Start: 2022-07-29 | End: 2022-07-29 | Stop reason: SDUPTHER

## 2022-07-29 RX ORDER — MEPERIDINE HYDROCHLORIDE 50 MG/ML
12.5 INJECTION INTRAMUSCULAR; INTRAVENOUS; SUBCUTANEOUS EVERY 5 MIN PRN
Status: DISCONTINUED | OUTPATIENT
Start: 2022-07-29 | End: 2022-07-29 | Stop reason: HOSPADM

## 2022-07-29 RX ADMIN — HYDROMORPHONE HYDROCHLORIDE 1 MG: 2 INJECTION INTRAMUSCULAR; INTRAVENOUS; SUBCUTANEOUS at 07:43

## 2022-07-29 RX ADMIN — HYDROMORPHONE HYDROCHLORIDE 0.5 MG: 1 INJECTION, SOLUTION INTRAMUSCULAR; INTRAVENOUS; SUBCUTANEOUS at 08:45

## 2022-07-29 RX ADMIN — SODIUM CHLORIDE: 9 INJECTION, SOLUTION INTRAVENOUS at 11:59

## 2022-07-29 RX ADMIN — SODIUM CHLORIDE: 9 INJECTION, SOLUTION INTRAVENOUS at 23:51

## 2022-07-29 RX ADMIN — SODIUM CHLORIDE, SODIUM LACTATE, POTASSIUM CHLORIDE, AND CALCIUM CHLORIDE: .6; .31; .03; .02 INJECTION, SOLUTION INTRAVENOUS at 07:30

## 2022-07-29 RX ADMIN — OXYCODONE AND ACETAMINOPHEN 1 TABLET: 5; 325 TABLET ORAL at 20:50

## 2022-07-29 RX ADMIN — CEFTRIAXONE SODIUM 1000 MG: 1 INJECTION, POWDER, FOR SOLUTION INTRAMUSCULAR; INTRAVENOUS at 10:33

## 2022-07-29 RX ADMIN — OXYCODONE AND ACETAMINOPHEN 1 TABLET: 5; 325 TABLET ORAL at 12:18

## 2022-07-29 RX ADMIN — KETOROLAC TROMETHAMINE 30 MG: 30 INJECTION, SOLUTION INTRAMUSCULAR; INTRAVENOUS at 08:03

## 2022-07-29 RX ADMIN — Medication 10 ML: at 20:46

## 2022-07-29 RX ADMIN — MORPHINE SULFATE 2 MG: 2 INJECTION, SOLUTION INTRAMUSCULAR; INTRAVENOUS at 06:40

## 2022-07-29 RX ADMIN — ENOXAPARIN SODIUM 40 MG: 100 INJECTION SUBCUTANEOUS at 10:33

## 2022-07-29 RX ADMIN — ONDANSETRON 4 MG: 2 INJECTION INTRAMUSCULAR; INTRAVENOUS at 07:52

## 2022-07-29 RX ADMIN — LIDOCAINE HYDROCHLORIDE 80 MG: 20 INJECTION, SOLUTION INFILTRATION; PERINEURAL at 07:55

## 2022-07-29 RX ADMIN — SODIUM CHLORIDE: 9 INJECTION, SOLUTION INTRAVENOUS at 10:30

## 2022-07-29 RX ADMIN — HYDROMORPHONE HYDROCHLORIDE 0.5 MG: 1 INJECTION, SOLUTION INTRAMUSCULAR; INTRAVENOUS; SUBCUTANEOUS at 08:28

## 2022-07-29 RX ADMIN — KETOROLAC TROMETHAMINE 30 MG: 30 INJECTION, SOLUTION INTRAMUSCULAR; INTRAVENOUS at 08:00

## 2022-07-29 RX ADMIN — Medication 10 ML: at 10:35

## 2022-07-29 RX ADMIN — PROPOFOL 200 MG: 10 INJECTION, EMULSION INTRAVENOUS at 07:55

## 2022-07-29 RX ADMIN — TAMSULOSIN HYDROCHLORIDE 0.4 MG: 0.4 CAPSULE ORAL at 10:33

## 2022-07-29 ASSESSMENT — PAIN - FUNCTIONAL ASSESSMENT
PAIN_FUNCTIONAL_ASSESSMENT: 0-10
PAIN_FUNCTIONAL_ASSESSMENT: ACTIVITIES ARE NOT PREVENTED

## 2022-07-29 ASSESSMENT — PAIN SCALES - GENERAL
PAINLEVEL_OUTOF10: 10
PAINLEVEL_OUTOF10: 3
PAINLEVEL_OUTOF10: 4
PAINLEVEL_OUTOF10: 10
PAINLEVEL_OUTOF10: 10
PAINLEVEL_OUTOF10: 0
PAINLEVEL_OUTOF10: 8
PAINLEVEL_OUTOF10: 9

## 2022-07-29 ASSESSMENT — PAIN DESCRIPTION - DESCRIPTORS
DESCRIPTORS: CRAMPING
DESCRIPTORS: ACHING;CRAMPING

## 2022-07-29 ASSESSMENT — PAIN DESCRIPTION - ORIENTATION
ORIENTATION: RIGHT;LOWER

## 2022-07-29 ASSESSMENT — PAIN DESCRIPTION - LOCATION
LOCATION: BACK

## 2022-07-29 ASSESSMENT — PAIN DESCRIPTION - ONSET: ONSET: ON-GOING

## 2022-07-29 ASSESSMENT — PAIN DESCRIPTION - PAIN TYPE
TYPE: ACUTE PAIN
TYPE: ACUTE PAIN

## 2022-07-29 ASSESSMENT — PAIN DESCRIPTION - FREQUENCY: FREQUENCY: CONTINUOUS

## 2022-07-29 ASSESSMENT — PAIN SCALES - WONG BAKER
WONGBAKER_NUMERICALRESPONSE: 0

## 2022-07-29 NOTE — CARE COORDINATION
CASE MANAGEMENT INITIAL ASSESSMENT      Reviewed chart and completed assessment with patient:bedside  Family present: none  Explained Case Management role/services. Primary contact information:Jaron/parent    Health Care Decision Maker :   Primary Decision Maker: Sandeep Jefferson - Parent - [phone]    Secondary Decision Maker: Azam Lester - Parent - [phone]          Can this person be reached and be able to respond quickly, such as within a few minutes or hours? Yes    Admit date/status:7/28/22  Diagnosis:SIRS, ureter stent remaoval   Is this a Readmission?:  Yes      Insurance:[employer] [place] PPO   Precert required for SNF: Yes       3 night stay required: No    Living arrangements, Adls, care needs, prior to admission:lives in a 2 story house with jennifer and two young children. No JIN    Durable Medical Equipment at home:  Walker__Cane__RTS__ BSC__Shower Chair__  02__ HHN__ CPAP__  BiPap__  Hospital Bed__ W/C___ Other_____    Services in the home and/or outpatient, prior to admission:none    Current Delmi Elizalde MD                                Medications: Prescription coverage? Yes Will pt require financial assistance with medications No     Transportation needs: none/private       PT/OT recs:none    Hospital Exemption Notification (HEN):needed for SNF    Barriers to discharge:none    Plan/comments:pt from home with jennifer and 2 young children. Pt states jennifer travels a lot for work, so she prefers not to add him to contact list. Pt is IPTA, denies discharge planning needs. CM will follow for potential needs.  Shefali Miranda RN     ECOC on chart for MD signature

## 2022-07-29 NOTE — ANESTHESIA POSTPROCEDURE EVALUATION
Department of Anesthesiology  Postprocedure Note    Patient: Richard Chopra  MRN: [id number]  YOB: 1985  Date of evaluation: 7/29/2022      Procedure Summary     Date: 07/29/22 Room / Location: [address]    Anesthesia Start: 2456 Anesthesia Stop: 9578    Procedure: CYSTOSCOPY, RIGHT STENT REMOVAL (Right: Perineum) Diagnosis:       Kris Goltz, kidney      Irving Promise, kidney [N20.0])    Surgeons: Wan Saucedo MD Responsible Provider: Enmanuel Aj MD    Anesthesia Type: General ASA Status: 2          Anesthesia Type: General    Claire Phase I: Claire Score: 10    Claire Phase II: Claire Score: 10      Anesthesia Post Evaluation    Patient location during evaluation: PACU  Patient participation: complete - patient participated  Level of consciousness: awake and alert  Airway patency: patent  Nausea & Vomiting: no nausea and no vomiting  Complications: no  Cardiovascular status: blood pressure returned to baseline  Respiratory status: acceptable  Hydration status: euvolemic  Comments: VSS on transfer to phase 2 recovery. No anesthetic complications.

## 2022-07-29 NOTE — PROGRESS NOTES
Patient admitted to [address] for pre-procedure. Consents verified. Patient NPO since 3366 last night. Patient did not have any belongings with her when she came from inpatient unit. Patient uncomfortable and having 9/10 pain on admission. Anesthesia aware. Dr. Brandon Jovel in room with patient.

## 2022-07-29 NOTE — ED NOTES
Pt not willing to give urine sample; going to call her urologist herself     Refugio Daly  07/28/22 2017

## 2022-07-29 NOTE — ED NOTES
Pt asked if she can give urine sample, pt stated \"No I cant, I am waiting for urology to call me back\" pt informed provider is waiting for urine to decide what to do next. Pt stated \"what other facilities does urology go to\" pt informed this RN does not have that information. Pt then requested to speak with charge RN, Roxy Lara RN and Cherylene Due PA updated.       Manas Mitchell RN  07/28/22 2014

## 2022-07-29 NOTE — ACP (ADVANCE CARE PLANNING)
Advance Care Planning     General Advance Care Planning (ACP) Conversation    Date of Conversation: 7/28/2022  Conducted with: Patient with Decision Making Capacity    Healthcare Decision Maker:    Primary Decision Maker: Edin Escalante - Parent - [phone]    Secondary Decision Maker: Tiny Chacont - Parent - [phone]  Click here to complete Healthcare Decision Makers including selection of the Healthcare Decision Maker Relationship (ie \"Primary\"). Today we documented Decision Maker(s) consistent with Legal Next of Kin hierarchy.     Content/Action Overview:  Has NO ACP documents/care preferences - information provided, considering goals and options  Reviewed DNR/DNI and patient elects Full Code (Attempt Resuscitation)        Length of Voluntary ACP Conversation in minutes:  <16 minutes (Non-Billable)    Shefali Bacon RN

## 2022-07-29 NOTE — H&P
[facility] Medicine History & Physical      PCP: Nydia Castro DO    Date of Admission: 7/28/2022    Date of Service: Pt seen/examined on 7/28/2022 and Admitted to Inpatient with expected LOS greater than two midnights due to medical therapy. Chief Complaint:  Right flank pain    History Of Present Illness:      43 y.o. female , with PMH of obesity and kidney stones, who presented to [facility] with right flank pain. History obtained from the patient and review of EMR. The patient stated she has been dealing with kidney stones for many years now. She stated over the last week she has passed 3 stones and was recently seen admitted here at [facility]. The patient stated she was discharged from [facility] and went to the OP surgery center yesterday and  had a stent placed by Dr. Marlene Nash with Urology. She stated last night she began to experience 7/10 pain in her vaginal area that radiated to her right flank area and a fever of 101.5. in the ED the patient was found to have a lactic acid of 2.3 and WBC of 15. A CT abdomen pelvis was obtained that revealed re demonstration of bilateral nephrolithiasis with the largest stone in the lower pole of the right kidney measuring 7.9 mm well positioned right ureteric stent. She was given toradol, morphine and oxycodone in the ED. The patient will be admitted for further evaluation and treatment. Urology was consulted and plans for stent removal in the am. The patient denied any other associated symptoms as well as any aggravating and/or alleviating factors. At the time of this assessment, the patient was resting comfortably in bed. She currently denies any chest pain, back pain, abdominal pain, shortness of breath, numbness, tingling, N/V/C/D, fever and/or chills.      Past Medical History:          Diagnosis Date    Anxiety     Depression     Ectopic pregnancy 2013    Endometriosis     IBS (irritable bowel syndrome)     Infertility, female     Has seen Dr. Jimenez Cortés in past multiple IUI's natural conception this pregnancy. Interstitial cystitis     Kidney stones     x2    Migraines     Obesity (BMI 30.0-34. 9)     PCOS (polycystic ovarian syndrome)     Prolonged emergence from general anesthesia     Stomach ulcer     Urinary incontinence      Past Surgical History:          Procedure Laterality Date    ABDOMINAL SURGERY      BLADDER SURGERY      CYSTOSCOPY      lithotripsy for stone extraction    CYSTOSCOPY Left 3/20/2022    CYSTOSCOPY; LEFT URETEROSCOPY WITH STONE MANIPULATION, AND EXTRACTION performed by Ekaterina Sotelo MD at [address]  12/21/13    ectopic pregnancy excision laparoscopic left linear salpingostomy    HYSTERECTOMY      HYSTERECTOMY ABDOMINAL Bilateral 3/18/2019    ROBOTIC ASSISTED TOTAL LAPAROSCOPIC HYSTERECTOMY, BILATERAL SALPINGECTION   performed by Cabrera Carmen MD at [address] [facility]  10/05/2017     DxLS, Evacuation of ectopic pregnancy-fimbriated portion of tube    TONSILLECTOMY      WISDOM TOOTH EXTRACTION       Medications Prior to Admission:      Prior to Admission medications    Medication Sig Start Date End Date Taking? Authorizing Provider   Rimegepant Sulfate (NURTEC) 75 MG TBDP Take 75 mg by mouth as needed (migraine)    Historical Provider, MD   tamsulosin (FLOMAX) 0.4 MG capsule Take 1 capsule by mouth daily 3/21/22   Alpa Li MD   ibuprofen (ADVIL;MOTRIN) 600 MG tablet Take 1 tablet by mouth 3 times daily as needed for Pain  Patient not taking: Reported on 3/19/2022 2/10/22 7/27/22  Juan M Silveira, APRN - CNP   eletriptan (RELPAX) 40 MG tablet TAKE ONE-HALF TO ONE TABLET BY MOUTH ONCE AS NEEDED FOR MIGRAINE MAY REPEAT IN 2 HOURS IF NEEDED 11/5/21   Tabatha Mcneil DO   SUMAtriptan (IMITREX) 100 MG tablet Take 1 tablet by mouth once as needed for Migraine (may repeat 2nd dose within 2 hours.  max 2 tabs in 24 hours.) 11/5/21 11/5/21  Tabatha Mcneil DO   eletriptan (RELPAX) 40 MG tablet TAKE ONE-HALF TO ONE TABLET BY MOUTH ONCE AS NEEDED FOR MIGRAINE MAY REPEAT IN 2 HOURS IF NEEDED 10/19/20   PERLA Bingham CNP     Allergies:  Fentanyl, Topamax [topiramate], and Cymbalta [duloxetine hcl]    Social History:      The patient currently lives at home    TOBACCO:   reports that she has never smoked. She has never used smokeless tobacco.  ETOH:   reports no history of alcohol use. E-cigarette/Vaping       Questions Responses    E-cigarette/Vaping Use Never User    Start Date     Passive Exposure     Quit Date     Counseling Given     Comments           Family History:      Reviewed and negative in regards to presenting illness/complaint. Problem Relation Age of Onset    Cancer Maternal Grandmother     Hypertension Maternal Grandmother     Elevated Lipids Maternal Grandmother     Cancer Paternal Grandmother     High Cholesterol Paternal Grandmother     Hypertension Paternal Grandmother     Elevated Lipids Paternal Grandmother     Breast Cancer Paternal Grandmother     Parkinsonism Mother     Depression Mother     Migraines Father     Arthritis Father     Arthritis Maternal Uncle     Diabetes Maternal Uncle     High Blood Pressure Maternal Uncle     Arthritis Maternal Grandfather     Diabetes Maternal Grandfather     Heart Disease Maternal Grandfather     High Blood Pressure Maternal Grandfather     High Cholesterol Maternal Grandfather     High Cholesterol Paternal Grandfather     High Blood Pressure Paternal Grandfather     Cancer Paternal Grandfather         esphageal    COPD Paternal Grandfather      REVIEW OF SYSTEMS COMPLETED:   Pertinent positives as noted in the HPI. All other systems reviewed and negative.     PHYSICAL EXAM PERFORMED:    /68   Pulse 68   Temp 98.8 °F (37.1 °C) (Oral)   Resp 14   Ht 5' 8\" (1.727 m)   Wt 200 lb (90.7 kg)   LMP 03/14/2019   SpO2 100%   BMI 30.41 kg/m²     General appearance:  Pleasant, obese female in no apparent distress, appears stated age and cooperative. HEENT: Pupils equal, round, and reactive to light. Extra ocular muscles intact. Conjunctivae/corneas clear. Neck: Supple, with full range of motion. No jugular venous distention. Trachea midline. Respiratory:  Normal respiratory effort. Clear to auscultation, bilaterally without Rales/Wheezes/Rhonchi. Cardiovascular:  Regular rate and rhythm with normal S1/S2 without murmurs, rubs or gallops. Abdomen: Soft, obese,round non-tender, non-distended with normal bowel sounds. Musculoskeletal:  No clubbing, cyanosis or edema bilaterally. Full range of motion without deformity. Skin: Skin color, texture, turgor normal.  No significant rashes or lesions. Neurologic:  Neurovascularly intact Cranial nerves: II-XII intact, grossly non-focal.  Psychiatric:  Alert and oriented, thought content appropriate, normal insight  Capillary Refill: Brisk,3 seconds, normal  Peripheral Pulses: +2 palpable, equal bilaterally     Labs:     Recent Labs     07/26/22  1516 07/28/22  1603   WBC 7.8 15.0*   HGB 13.8 13.8   HCT 40.9 40.9    375     Recent Labs     07/26/22  1516 07/27/22  0610 07/28/22  1603    139 138   K 3.7 4.0 3.8    106 105   CO2 25 24 20*   BUN 10 11 11   CREATININE 0.6 0.6 0.6   CALCIUM 9.7 8.4 8.7     Recent Labs     07/28/22  1603   AST 30   ALT 60*   BILITOT 0.4   ALKPHOS 70     Urinalysis:      Lab Results   Component Value Date/Time    NITRU POSITIVE 07/28/2022 07:18 PM    WBCUA 3-5 07/26/2022 03:12 PM    BACTERIA 1+ 07/26/2022 03:12 PM    RBCUA 5-10 07/26/2022 03:12 PM    BLOODU MODERATE 07/28/2022 07:18 PM    SPECGRAV 1.015 07/28/2022 07:18 PM    GLUCOSEU Negative 07/28/2022 07:18 PM     Radiology:     CXR: I have reviewed the CXR with the following interpretation: N/A    EKG:  I have reviewed the EKG with the following interpretation: N/A    CT ABDOMEN PELVIS W IV CONTRAST Additional Contrast? None   Preliminary Result   1.  Redemonstration of bilateral nephrolithiasis with the largest stone in the   lower pole of the right kidney measuring 7.9 mm. Well-positioned right   ureteric stent. 2. Fatty liver but no focal disease. No evidence of gallbladder disease. 3. No acute gastrointestinal abnormality. Consults:    IP CONSULT TO UROLOGY    ASSESSMENT:    Active Hospital Problems    Diagnosis Date Noted    Hydroureteronephrosis [N13.30] 03/19/2022    Obesity (BMI 30.0-34. 9) [E66.9]      PLAN:    Right flank pain 2/2 stent placement in recent hydroureteronephrosis  -CT abdomen pelvis revealed: re demonstration of bilateral nephrolithiasis with the largest stone in the lower pole of the right kidney measuring 7.9 mm well positioned right ureteric stent.   -toradol given in ED  -morphine given in ED  -oxycodone given in ED  -NPO  -urology consulted in ED - appreciate recommendations in advance  -continue flomax  -PRN pain medication  -MIVF    SIRS 2/2 above as well as acute cystitis. Lactic acid 2.3 and WBC 15 on admission  -UA positive nitrite  -blood cultures ordered in ED  -1 L NS given in ED  -ceftriaxone given in ED and continued 7/28/2022  -repeat lactic  -bmp in am    Obesity  With Body mass index is 30.4 kg/m². Complicating assessment and treatment. Placing patient at risk for multiple co-morbidities as well as early death and contributing to the patient's presentation. Counseled on weight loss    DVT Prophylaxis: Lovenox    Diet: Diet NPO Exceptions are: Sips of Water with Meds    Code Status: Prior    PT/OT Eval Status: No indication for need at this time    Dispo - 2-3 days pending clinical improvement     PERLA Bui - CNP    Thank you Jes Oliveira DO for the opportunity to be involved in this patient's care.  If you have any questions or concerns please feel free to contact me at [phone].  -------------------dr. Odin Galvan-----------------

## 2022-07-29 NOTE — ANESTHESIA PRE PROCEDURE
Department of Anesthesiology  Preprocedure Note       Name:  Colby Berman   Age:  43 y.o.  :  1985                                          MRN:  [id number]         Date:  2022      Surgeon: Ida Rincon):  Jose Angel Coronel MD    Procedure: Procedure(s):  CYSTOSCOPY, RIGHT STENT REMOVAL    Medications prior to admission:   Prior to Admission medications    Medication Sig Start Date End Date Taking? Authorizing Provider   Rimegepant Sulfate (NURTEC) 75 MG TBDP Take 75 mg by mouth as needed (migraine)    Historical Provider, MD   tamsulosin (FLOMAX) 0.4 MG capsule Take 1 capsule by mouth daily 3/21/22   Omar Sotelo MD   ibuprofen (ADVIL;MOTRIN) 600 MG tablet Take 1 tablet by mouth 3 times daily as needed for Pain  Patient not taking: Reported on 3/19/2022 2/10/22 7/27/22  PERLA Beck CNP   eletriptan (RELPAX) 40 MG tablet TAKE ONE-HALF TO ONE TABLET BY MOUTH ONCE AS NEEDED FOR MIGRAINE MAY REPEAT IN 2 HOURS IF NEEDED 21   Tabatha Mcneil DO   SUMAtriptan (IMITREX) 100 MG tablet Take 1 tablet by mouth once as needed for Migraine (may repeat 2nd dose within 2 hours.  max 2 tabs in 24 hours.) 21  Tabatha Mcneil DO   eletriptan (RELPAX) 40 MG tablet TAKE ONE-HALF TO ONE TABLET BY MOUTH ONCE AS NEEDED FOR MIGRAINE MAY REPEAT IN 2 HOURS IF NEEDED 10/19/20   PERLA Mak CNP       Current medications:    Current Facility-Administered Medications   Medication Dose Route Frequency Provider Last Rate Last Admin    tamsulosin (FLOMAX) capsule 0.4 mg  0.4 mg Oral Daily Elizabeth Raw, APRN - CNP        sodium chloride flush 0.9 % injection 5-40 mL  5-40 mL IntraVENous 2 times per day Elizabeth Raw, APRN - CNP   10 mL at 22    sodium chloride flush 0.9 % injection 5-40 mL  5-40 mL IntraVENous PRN Elizabeth Raw, APRN - CNP        0.9 % sodium chloride infusion   IntraVENous PRN Elizabeth Raw, APRN - CNP        enoxaparin (LOVENOX) injection 40 mg  40 mg SubCUTAneous Daily Fleet Sumner, APRN - CNP        acetaminophen (TYLENOL) tablet 650 mg  650 mg Oral Q6H PRN Fleet Sumner, APRN - CNP        Or    acetaminophen (TYLENOL) suppository 650 mg  650 mg Rectal Q6H PRN Fleet Sumner, APRN - CNP        0.9 % sodium chloride infusion   IntraVENous Continuous Fleet Sumner, APRN - CNP 75 mL/hr at 07/28/22 2238 New Bag at 07/28/22 2238    cefTRIAXone (ROCEPHIN) 1,000 mg in dextrose 5 % 50 mL IVPB mini-bag  1,000 mg IntraVENous Q24H Fleet Sumner, APRN - CNP        ketorolac (TORADOL) injection 30 mg  30 mg IntraVENous Q6H PRN Fleet Sumner, APRN - CNP        oxyCODONE-acetaminophen (PERCOCET) 5-325 MG per tablet 1 tablet  1 tablet Oral Q4H PRN Fleet Sumner, APRN - CNP   1 tablet at 07/28/22 2233       Allergies: Allergies   Allergen Reactions    Fentanyl Nausea And Vomiting     She reports it made her extremely sick and did not help with pain, made her feel like she wanted to die    Topamax [Topiramate] Other (See Comments)     Kidney stones    Cymbalta [Duloxetine Hcl] Other (See Comments)     moodiness       Problem List:    Patient Active Problem List   Diagnosis Code    Migraine with aura and without status migrainosus, not intractable G43. 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 9) E66.9    Hydroureteronephrosis N13.30    Ureterovesical junction (UVJ) obstruction N13.5    Acquired ureterovesical junction (UVJ) obstruction N13.5    Obstructive uropathy N13.9    History of migraine Z86.69    SIRS (systemic inflammatory response syndrome) (HCC) R65.10       Past Medical History:        Diagnosis Date    Anxiety     Depression     Ectopic pregnancy 2013    Endometriosis     IBS (irritable bowel syndrome)     Infertility, female     Has seen Dr. Casey De Oliveira in past multiple IUI's natural conception this pregnancy.  Interstitial cystitis     Kidney stones     x2    Migraines     Obesity (BMI 30.0-34. 9)     PCOS (polycystic ovarian syndrome)     Prolonged emergence from general anesthesia     Stomach ulcer     Urinary incontinence        Past Surgical History:        Procedure Laterality Date    ABDOMINAL SURGERY      BLADDER SURGERY      CYSTOSCOPY      lithotripsy for stone extraction    CYSTOSCOPY Left 3/20/2022    CYSTOSCOPY; LEFT URETEROSCOPY WITH STONE MANIPULATION, AND EXTRACTION performed by Harry Mustafa MD at [address] [place] [address]  12/21/13    ectopic pregnancy excision laparoscopic left linear salpingostomy    HYSTERECTOMY      HYSTERECTOMY ABDOMINAL Bilateral 3/18/2019    ROBOTIC ASSISTED TOTAL LAPAROSCOPIC HYSTERECTOMY, BILATERAL SALPINGECTION   performed by Paola Rodriguez MD at [facility]  10/05/2017     DxLS, Evacuation of ectopic pregnancy-fimbriated portion of tube    TONSILLECTOMY      WISDOM TOOTH EXTRACTION         Social History:    Social History     Tobacco Use    Smoking status: Never    Smokeless tobacco: Never   Substance Use Topics    Alcohol use: No                                Counseling given: Not Answered      Vital Signs (Current):   Vitals:    07/29/22 0424 07/29/22 0515 07/29/22 0523 07/29/22 0640   BP: 113/71 124/72     Pulse: 65 63     Resp: 18 18  18   Temp: 97.9 °F (36.6 °C) 97.8 °F (36.6 °C)     TempSrc: Oral Oral     SpO2:  96%     Weight:   200 lb (90.7 kg)    Height:                                                  BP Readings from Last 3 Encounters:   07/29/22 124/72   07/27/22 107/65   03/21/22 100/64       NPO Status:                                                                                 BMI:   Wt Readings from Last 3 Encounters:   07/29/22 200 lb (90.7 kg)   07/26/22 200 lb (90.7 kg)   03/21/22 215 lb 6.4 oz (97.7 kg)     Body mass index is 30.41 kg/m².     CBC: Lab Results   Component Value Date/Time    WBC 8.7 07/29/2022 05:25 AM    RBC 4.05 07/29/2022 05:25 AM    HGB 11.8 07/29/2022 05:25 AM    HCT 35.0 07/29/2022 05:25 AM    MCV 86.3 07/29/2022 05:25 AM    RDW 13.3 07/29/2022 05:25 AM     07/29/2022 05:25 AM       CMP:   Lab Results   Component Value Date/Time     07/28/2022 10:51 PM    K 4.0 07/28/2022 10:51 PM     07/28/2022 10:51 PM    CO2 23 07/28/2022 10:51 PM    BUN 9 07/28/2022 10:51 PM    CREATININE 0.7 07/28/2022 10:51 PM    GFRAA >60 07/28/2022 10:51 PM    GFRAA >60 08/19/2012 09:47 AM    AGRATIO 1.7 07/28/2022 04:03 PM    LABGLOM >60 07/28/2022 10:51 PM    GLUCOSE 166 07/28/2022 10:51 PM    PROT 7.2 07/28/2022 04:03 PM    PROT 6.1 08/18/2012 07:04 AM    CALCIUM 8.1 07/28/2022 10:51 PM    BILITOT 0.4 07/28/2022 04:03 PM    ALKPHOS 70 07/28/2022 04:03 PM    AST 30 07/28/2022 04:03 PM    ALT 60 07/28/2022 04:03 PM       POC Tests: No results for input(s): POCGLU, POCNA, POCK, POCCL, POCBUN, POCHEMO, POCHCT in the last 72 hours.     Coags:   Lab Results   Component Value Date/Time    PROTIME 10.9 10/13/2015 09:15 PM    PROTIME 14.5 04/21/2011 06:08 PM    INR 1.01 10/13/2015 09:15 PM    APTT 31.8 11/14/2013 03:04 PM       HCG (If Applicable):   Lab Results   Component Value Date    PREGTESTUR Negative 03/18/2019        ABGs: No results found for: PHART, PO2ART, CSK8AGE, IGZ5DBW, BEART, V8PRNTOW     Type & Screen (If Applicable):  No results found for: LABABO, LABRH    Drug/Infectious Status (If Applicable):  No results found for: HIV, HEPCAB    COVID-19 Screening (If Applicable): No results found for: COVID19        Anesthesia Evaluation   no history of anesthetic complications:   Airway: Mallampati: III  TM distance: >3 FB   Neck ROM: limited  Mouth opening: > = 3 FB   Dental: normal exam         Pulmonary:Negative Pulmonary ROS                              Cardiovascular:Negative CV ROS                      Neuro/Psych:   (+) headaches:

## 2022-07-29 NOTE — ED PROVIDER NOTES
Emergency Department Attending Provider Note  Location: [address] [place] [address]  ED  7/28/2022     Patient Identification  Debra Martinez is a 43 y.o. female    Debra Martinez was evaluated in the Emergency Department for Flank and abd pain. Although initial history and physical exam information was obtained by TRUE/NPP/MD/DO (who also dictated a record of this visit), I personally saw the patient and performed a substantive portion of the visit including all aspects of the medical decision making. Patient seen and evaluated. Relevant records reviewed. Patient here with fevver and pain s/p utereral stenting and has evidence of UTI. Well appearing, requiring pain meds IV. Discussed with urology who recommends admission. HD stable      I, Dr. Benedicto Reyna, am the primary clinician of record. I personally saw the patient and independently provided 0 minutes of non-concurrent critical care out of the total shared critical care time provided. This chart was generated in part by using Dragon Dictation system and may contain errors related to that system including errors in grammar, punctuation, and spelling, as well as words and phrases that may be inappropriate. If there are any questions or concerns please feel free to contact the dictating provider for clarification.      Lizette Moyer MD  [address] MD  07/28/22 3954

## 2022-07-29 NOTE — BRIEF OP NOTE
Brief Postoperative Note      Patient: John Miguel  YOB: 1985  MRN: [id number]    Date of Procedure: 7/29/2022    Pre-Op Diagnosis: Stone, kidney [N20.0], stent pain    Post-Op Diagnosis: Same       Procedure(s):  CYSTOSCOPY, RIGHT STENT REMOVAL    Surgeon(s):  Jesus Villa MD    Assistant:  Surgical Assistant: Doyle Moreno    Anesthesia: Monitor Anesthesia Care    Estimated Blood Loss (mL): Minimal    Complications: None    Specimens:   * No specimens in log *    Implants:  * No implants in log *      Drains: * No LDAs found *    Findings: right ureteral stent    Plan- ok to d/c to home later today or tomorrow if pain controlled, doubt UTI with recent negative culture    Electronically signed by Jesus Villa MD on 7/29/2022 at 8:13 AM

## 2022-07-29 NOTE — PROGRESS NOTES
Perfect serve to Dr. Chandler Berman:      7/29/22 5:29 AM  [phone] Hospital or Facility: [facility] From: Clarisse Phillips RE: Didier Jack 1985 RM: [id number] good am doc, pt is c/o of pain and asking if she can have her morphine ordered? pt verbalized that [facility] seen her this morning and said that she can have prn morphine, I think I saw it on PERLA Hollingsworth's notes as well, but did not manage to order. Thank you!  Need Callback:NO CALLBACK REQ C4 PROGRESSIVE CARE  Read 5:44 AM  7/29/22 5:44 AM   Lets give morphine 2 mg iv once

## 2022-07-29 NOTE — PLAN OF CARE
Problem: Safety - Adult  Goal: Free from fall injury  Note: Pt is low fall risk  Pt will remain free from falls. Call light within reach. Bed side table within reach. Wheels locked. Bed in lowest position. No bed check indicated. Pt instructed to call out for assistance. Pt expressed understanding & calls out appropriately.

## 2022-07-29 NOTE — ED NOTES
Pt updated that Brayden VALDEZ spoke with admitting doctors just waiting for orders.       Shun Shrestha RN  07/28/22 2121

## 2022-07-29 NOTE — OP NOTE
[facility]                 Valerio Landry                                OPERATIVE REPORT    PATIENT NAME: Jori Mancia                   :        1985  MED REC NO:   [id number]                          ROOM:       [id number]  ACCOUNT NO:   [de-identified]                           ADMIT DATE: 2022  PROVIDER:     Fleming Noa. Abby Lesches, MD    DATE OF PROCEDURE:  2022    PREOPERATIVE DIAGNOSES:  Retained right double-J ureteral stent, right  flank pain. POSTOPERATIVE DIAGNOSES:  Retained right double-J ureteral stent, right  flank pain. PROCEDURES PERFORMED:  Cystoscopy, right stent removal.    SURGEON:  Jose Hilliard MD    INDICATIONS FOR PROCEDURE:  The patient is a 41-year-old female who had  a right ureteroscopy to remove the stone a few days ago. She was  hospitalized due to flank pain and CT scan was unremarkable other than  non-obstructing kidney stone and the retained stent. The risks and  benefits of a cysto, right stent removal were discussed with the  patient. She agreed to proceed. DESCRIPTION OF PROCEDURE:  The patient had preoperative antibiotics, MAC  anesthetic, was supine in the frogleg position. Genitalia were prepped  and draped in the usual sterile fashion. A 17-Occitan flexible  cystoscope was inserted into the patient's urethra. I looked in the  patient's bladder, found the right stent coming out of the ureteric  orifice. I took a grasper and pulled the stent out. She then was sent  to the recovery room and tolerated the procedure well. PLAN:  She needs to follow up with Dr. Joe Garcia or other urologist in one  month. ESTIMATED BLOOD LOSS:  0 mL.         Erin Baker MD    D: 2022 8:38:47       T: 2022 9:22:21     [id number]  Job#: [id number]     Doc#: [id number]    CC:

## 2022-07-29 NOTE — PROGRESS NOTES
Hospitalist Progress Note      PCP: Shabana Momin DO    Date of Admission: 7/28/2022    Chief Complaint: R flank pain       Subjective:      Patient complained of right flank pain. Patient had Cystoscopy, right stent removal.    Medications:  Reviewed    Infusion Medications    sodium chloride      sodium chloride 75 mL/hr at 07/29/22 1030     Scheduled Medications    tamsulosin  0.4 mg Oral Daily    sodium chloride flush  5-40 mL IntraVENous 2 times per day    enoxaparin  40 mg SubCUTAneous Daily    cefTRIAXone (ROCEPHIN) IV  1,000 mg IntraVENous Q24H     PRN Meds: sodium chloride flush, sodium chloride, acetaminophen **OR** acetaminophen, ketorolac, oxyCODONE-acetaminophen      Intake/Output Summary (Last 24 hours) at 7/29/2022 1818  Last data filed at 7/29/2022 1229  Gross per 24 hour   Intake 1827 ml   Output 1025 ml   Net 802 ml       Physical Exam Performed:    /72   Pulse 63   Temp 98.2 °F (36.8 °C) (Axillary)   Resp 17   Ht 5' 8\" (1.727 m)   Wt 200 lb (90.7 kg)   LMP 03/14/2019   SpO2 96%   BMI 30.41 kg/m²     General appearance: No apparent distress, appears stated age and cooperative. HEENT: Pupils equal, round, and reactive to light. Conjunctivae/corneas clear. Neck: Supple, with full range of motion. No jugular venous distention. Trachea midline. Respiratory:  Normal respiratory effort. Clear to auscultation, bilaterally without Rales/Wheezes/Rhonchi. Cardiovascular: Regular rate and rhythm with normal S1/S2 without murmurs, rubs or gallops. Abdomen: Soft, non-tender, non-distended with normal bowel sounds. Musculoskeletal: No clubbing, cyanosis or edema bilaterally. Full range of motion without deformity. Skin: Skin color, texture, turgor normal.  No rashes or lesions. Neurologic:  Neurovascularly intact without any focal sensory/motor deficits.  Cranial nerves: II-XII intact, grossly non-focal.  Psychiatric: Alert and oriented, thought content appropriate, normal insight  Capillary Refill: Brisk,3 seconds, normal   Peripheral Pulses: +2 palpable, equal bilaterally       Labs:   Recent Labs     07/28/22  1603 07/29/22  0525   WBC 15.0* 8.7   HGB 13.8 11.8*   HCT 40.9 35.0*    288     Recent Labs     07/27/22  0610 07/28/22  1603 07/28/22  2251    138 139   K 4.0 3.8 4.0    105 105   CO2 24 20* 23   BUN 11 11 9   CREATININE 0.6 0.6 0.7   CALCIUM 8.4 8.7 8.1*     Recent Labs     07/28/22  1603   AST 30   ALT 60*   BILITOT 0.4   ALKPHOS 70     No results for input(s): INR in the last 72 hours. No results for input(s): Elizabeth Comber in the last 72 hours. Urinalysis:      Lab Results   Component Value Date/Time    NITRU POSITIVE 07/28/2022 07:18 PM    WBCUA see below 07/28/2022 07:18 PM    BACTERIA 1+ 07/26/2022 03:12 PM    RBCUA >100 07/28/2022 07:18 PM    BLOODU MODERATE 07/28/2022 07:18 PM    SPECGRAV 1.015 07/28/2022 07:18 PM    GLUCOSEU Negative 07/28/2022 07:18 PM       Radiology:  CT ABDOMEN PELVIS W IV CONTRAST Additional Contrast? None   Final Result   1. Redemonstration of bilateral nephrolithiasis with the largest stone in the   lower pole of the right kidney measuring 7.9 mm. Well-positioned right   ureteric stent. 2. Fatty liver but no focal disease. No evidence of gallbladder disease. 3. No acute gastrointestinal abnormality. Assessment/Plan:    Active Hospital Problems    Diagnosis     SIRS (systemic inflammatory response syndrome) (HCC) [R65.10]      Priority: Medium    Hydroureteronephrosis [N13.30]     Obesity (BMI 30.0-34. 9) [E66.9]      Right flank pain 2/2 stent placement in recent hydroureteronephrosis: s/p cystocopy and stent removal  -CT abdomen pelvis revealed: re demonstration of bilateral nephrolithiasis with the largest stone in the lower pole of the right kidney measuring 7.9 mm well positioned right ureteric stent.  -toradol given in ED  -morphine given in ED  -oxycodone given in ED  -NPO  -urology consulted in ED - appreciate recommendations in advance  -continue flomax  -PRN pain medication  -MIVF     SIRS 2/2 above as well as acute cystitis.  Lactic acid 2.3 and WBC 15 on admission  -UA positive nitrite  -blood cultures ordered in ED  -1 L NS given in ED  -ceftriaxone given in ED and continued 7/28/2022  -repeat lactic  -bmp in am           Emily Bobby MD

## 2022-07-29 NOTE — PLAN OF CARE
Problem: Pain  Goal: Verbalizes/displays adequate comfort level or baseline comfort level  Outcome: Not Progressing  Flowsheets (Taken 7/29/2022 0423)  Verbalizes/displays adequate comfort level or baseline comfort level:   Encourage patient to monitor pain and request assistance   Assess pain using appropriate pain scale     Problem: Pain  Goal: Verbalizes/displays adequate comfort level or baseline comfort level  Outcome: Not Progressing  Flowsheets (Taken 7/29/2022 0423)  Verbalizes/displays adequate comfort level or baseline comfort level:   Encourage patient to monitor pain and request assistance   Assess pain using appropriate pain scale

## 2022-07-29 NOTE — CONSULTS
Urology Attending Consult Note      Reason for Consultation: right flank pain    History: 44 y/o female had right ureteroscopy a few days ago to remove stone and has stent in. She has severe flank pain from stent. CT shows no abscess or obstructing stones. Family History, Social History, Review of Systems:  Reviewed and agreed to as per chart    Vitals:  /71   Pulse 74   Temp 99.1 °F (37.3 °C) (Temporal)   Resp 18   Ht 5' 8\" (1.727 m)   Wt 200 lb (90.7 kg)   LMP 2019   SpO2 95%   BMI 30.41 kg/m²   Temp  Av.3 °F (36.8 °C)  Min: 97.8 °F (36.6 °C)  Max: 99.1 °F (37.3 °C)    Intake/Output Summary (Last 24 hours) at 2022 0742  Last data filed at 2022 0546  Gross per 24 hour   Intake --   Output 350 ml   Net -350 ml         Physical:  Well developed, well nourished in no acute distress  Mood indicates no abnormalities. Pt doesnt appear depressed  Orientated to time and place  Neck is supple, trachea is midline  Respiratory effort is normal  Cardiovascular show no extremity swelling  Abdomen no masses or hernias are palpated, there is no tenderness. Liver and Spleen appear normal.  Skin show no abnormal lesions  Lymph nodes are not palpated in the inguinal, neck, or axillary area.        Labs:  WBC:    Lab Results   Component Value Date/Time    WBC 8.7 2022 05:25 AM     Hemoglobin/Hematocrit:    Lab Results   Component Value Date/Time    HGB 11.8 2022 05:25 AM    HCT 35.0 2022 05:25 AM     BMP:    Lab Results   Component Value Date/Time     2022 10:51 PM    K 4.0 2022 10:51 PM     2022 10:51 PM    CO2 23 2022 10:51 PM    BUN 9 2022 10:51 PM    LABALBU 4.5 2022 04:03 PM    CREATININE 0.7 2022 10:51 PM    CALCIUM 8.1 2022 10:51 PM    GFRAA >60 2022 10:51 PM    GFRAA >60 2012 09:47 AM    LABGLOM >60 2022 10:51 PM     PT/INR:    Lab Results   Component Value Date/Time    PROTIME 10.9 10/13/2015 09:15 PM    PROTIME 14.5 04/21/2011 06:08 PM    INR 1.01 10/13/2015 09:15 PM     PTT:    Lab Results   Component Value Date/Time    APTT 31.8 11/14/2013 03:04 PM   [APTT    Urine Culture: recent culture negative    Antibiotic Therapy: Rocephin    Imaging: CT-   Redemonstration of bilateral nephrolithiasis with the largest stone in the   lower pole of the right kidney measuring 7.9 mm. Well-positioned right   ureteric stent.        Impression/Plan: right flank pain  To OR to remove stent  Recent culture negative and one is pending, on Rocephin  Possible d/c to home later today or tomorrow based on symptoms    Kanika Gilbert MD

## 2022-07-29 NOTE — ED NOTES
Pt now stating \"I dont want to be admitted if its just a UTI, I feel like that's all it is. I could go home and take the stent out myself. I feel like I probably just need antibiotics\" Genaro VALDEZ updated.       Rebecca Engel RN  07/28/22 2054

## 2022-07-30 VITALS
HEART RATE: 85 BPM | TEMPERATURE: 98 F | HEIGHT: 68 IN | RESPIRATION RATE: 16 BRPM | OXYGEN SATURATION: 97 % | WEIGHT: 224.6 LBS | SYSTOLIC BLOOD PRESSURE: 116 MMHG | BODY MASS INDEX: 34.04 KG/M2 | DIASTOLIC BLOOD PRESSURE: 75 MMHG

## 2022-07-30 PROCEDURE — 6360000002 HC RX W HCPCS: Performed by: NURSE PRACTITIONER

## 2022-07-30 PROCEDURE — 2580000003 HC RX 258: Performed by: NURSE PRACTITIONER

## 2022-07-30 PROCEDURE — 6370000000 HC RX 637 (ALT 250 FOR IP): Performed by: NURSE PRACTITIONER

## 2022-07-30 RX ORDER — TAMSULOSIN HYDROCHLORIDE 0.4 MG/1
0.4 CAPSULE ORAL DAILY
Qty: 30 CAPSULE | Refills: 0 | Status: SHIPPED | OUTPATIENT
Start: 2022-07-30

## 2022-07-30 RX ORDER — OXYCODONE HYDROCHLORIDE AND ACETAMINOPHEN 5; 325 MG/1; MG/1
1 TABLET ORAL EVERY 6 HOURS PRN
Qty: 12 TABLET | Refills: 0 | Status: SHIPPED | OUTPATIENT
Start: 2022-07-30 | End: 2022-08-02

## 2022-07-30 RX ADMIN — Medication 10 ML: at 09:21

## 2022-07-30 RX ADMIN — TAMSULOSIN HYDROCHLORIDE 0.4 MG: 0.4 CAPSULE ORAL at 09:20

## 2022-07-30 RX ADMIN — ENOXAPARIN SODIUM 40 MG: 100 INJECTION SUBCUTANEOUS at 09:20

## 2022-07-30 RX ADMIN — CEFTRIAXONE SODIUM 1000 MG: 1 INJECTION, POWDER, FOR SOLUTION INTRAMUSCULAR; INTRAVENOUS at 09:28

## 2022-07-30 ASSESSMENT — PAIN SCALES - WONG BAKER
WONGBAKER_NUMERICALRESPONSE: 0

## 2022-07-30 NOTE — DISCHARGE SUMMARY
Hospital Medicine Discharge Summary    Patient ID: Loly Kingston      Patient's PCP: Selena Looney DO    Admit Date: 7/28/2022     Discharge Date:   7/30/22    Admitting Provider: Arnaud Box DO     Discharge Provider: Nathan Camilo MD     Discharge Diagnoses: Active Hospital Problems    Diagnosis     SIRS (systemic inflammatory response syndrome) (HCC) [R65.10]      Priority: Medium    Hydroureteronephrosis [N13.30]     Obesity (BMI 30.0-34. 9) [E66.9]        The patient was seen and examined on day of discharge and this discharge summary is in conjunction with any daily progress note from day of discharge. Hospital Course:   43 y.o. female , with PMH of obesity and kidney stones, who presented to [facility] with right flank pain. History obtained from the patient and review of EMR. The patient stated she has been dealing with kidney stones for many years now. She stated over the last week she has passed 3 stones and was recently seen admitted here at [facility]. The patient stated she was discharged from [facility] and went to the OP surgery center yesterday and  had a stent placed by Dr. Salina Wilkes with Urology. She stated last night she began to experience 7/10 pain in her vaginal area that radiated to her right flank area and a fever of 101.5. in the ED the patient was found to have a lactic acid of 2.3 and WBC of 15. A CT abdomen pelvis was obtained that revealed re demonstration of bilateral nephrolithiasis with the largest stone in the lower pole of the right kidney measuring 7.9 mm well positioned right ureteric stent. She was given toradol, morphine and oxycodone in the ED. The patient will be admitted for further evaluation and treatment. Urology was consulted and plans for stent removal in the am. The patient denied any other associated symptoms as well as any aggravating and/or alleviating factors. At the time of this assessment, the patient was resting comfortably in bed.  She currently denies any chest pain, back pain, abdominal pain, shortness of breath, numbness, tingling, N/V/C/D, fever and/or chills. Right flank pain 2/2 stent placement in recent hydroureteronephrosis  -CT abdomen pelvis revealed: re demonstration of bilateral nephrolithiasis with the largest stone in the lower pole of the right kidney measuring 7.9 mm well positioned right ureteric stent. Patient had Cystoscopy, right stent removal.  -urology consulted  -continue flomax       SIRS 2/2 above as well as acute cystitis. Lactic acid 2.3 and WBC 15 on admission  -UA positive nitrite  Urine culture grew contaminants   -blood cultures  -ve  -1 L NS given in ED  -ceftriaxone given in ED and continued 7/28/2022      Physical Exam Performed:     /72   Pulse 68   Temp 98 °F (36.7 °C) (Oral)   Resp 16   Ht 5' 8\" (1.727 m)   Wt 224 lb 9.6 oz (101.9 kg)   LMP 03/14/2019   SpO2 95%   BMI 34.15 kg/m²       General appearance:  No apparent distress, appears stated age and cooperative. HEENT:  Normal cephalic, atraumatic without obvious deformity. Pupils equal, round, and reactive to light. Extra ocular muscles intact. Conjunctivae/corneas clear. Neck: Supple, with full range of motion. No jugular venous distention. Trachea midline. Respiratory:  Normal respiratory effort. Clear to auscultation, bilaterally without Rales/Wheezes/Rhonchi. Cardiovascular:  Regular rate and rhythm with normal S1/S2 without murmurs, rubs or gallops. Abdomen: Soft, non-tender, non-distended with normal bowel sounds. Musculoskeletal:  No clubbing, cyanosis or edema bilaterally. Full range of motion without deformity. Skin: Skin color, texture, turgor normal.  No rashes or lesions. Neurologic:  Neurovascularly intact without any focal sensory/motor deficits.  Cranial nerves: II-XII intact, grossly non-focal.  Psychiatric:  Alert and oriented, thought content appropriate, normal insight  Capillary Refill: Brisk,< 3 seconds   Peripheral Pulses: +2 palpable, equal bilaterally       Labs: For convenience and continuity at follow-up the following most recent labs are provided:      CBC:    Lab Results   Component Value Date/Time    WBC 8.7 07/29/2022 05:25 AM    HGB 11.8 07/29/2022 05:25 AM    HCT 35.0 07/29/2022 05:25 AM     07/29/2022 05:25 AM       Renal:    Lab Results   Component Value Date/Time     07/28/2022 10:51 PM    K 4.0 07/28/2022 10:51 PM     07/28/2022 10:51 PM    CO2 23 07/28/2022 10:51 PM    BUN 9 07/28/2022 10:51 PM    CREATININE 0.7 07/28/2022 10:51 PM    CALCIUM 8.1 07/28/2022 10:51 PM    PHOS 2.9 08/19/2012 09:47 AM         Significant Diagnostic Studies    Radiology:   CT ABDOMEN PELVIS W IV CONTRAST Additional Contrast? None   Final Result   1. Redemonstration of bilateral nephrolithiasis with the largest stone in the   lower pole of the right kidney measuring 7.9 mm. Well-positioned right   ureteric stent. 2. Fatty liver but no focal disease. No evidence of gallbladder disease. 3. No acute gastrointestinal abnormality. Consults:     IP CONSULT TO UROLOGY    Disposition:  Home     Condition at Discharge: Stable    Discharge Instructions/Follow-up:  urology    Code Status:  Full Code     Activity: activity as tolerated    Diet: regular       Discharge Medications:     Current Discharge Medication List             Details   oxyCODONE-acetaminophen (PERCOCET) 5-325 MG per tablet Take 1 tablet by mouth every 6 hours as needed for Pain for up to 12 doses. Qty: 12 tablet, Refills: 0    Comments: Reduce doses taken as pain becomes manageable  Associated Diagnoses: Obstructive uropathy                Details   tamsulosin (FLOMAX) 0.4 MG capsule Take 1 capsule by mouth in the morning.   Qty: 30 capsule, Refills: 0                Details   Rimegepant Sulfate (NURTEC) 75 MG TBDP Take 75 mg by mouth as needed (migraine)      !! eletriptan (RELPAX) 40 MG tablet TAKE ONE-HALF TO ONE TABLET BY MOUTH ONCE AS NEEDED FOR MIGRAINE MAY REPEAT IN 2 HOURS IF NEEDED  Qty: 6 tablet, Refills: 5      SUMAtriptan (IMITREX) 100 MG tablet Take 1 tablet by mouth once as needed for Migraine (may repeat 2nd dose within 2 hours. max 2 tabs in 24 hours.)  Qty: 9 tablet, Refills: 5      !! eletriptan (RELPAX) 40 MG tablet TAKE ONE-HALF TO ONE TABLET BY MOUTH ONCE AS NEEDED FOR MIGRAINE MAY REPEAT IN 2 HOURS IF NEEDED  Qty: 9 tablet, Refills: 5    Associated Diagnoses: Migraine with aura and without status migrainosus, not intractable       !! - Potential duplicate medications found. Please discuss with provider. Time Spent on discharge is more than 20 minutes in the examination, evaluation, counseling and review of medications and discharge plan. Signed: Carmen Osullivan MD   7/30/2022      Thank you Claus Monreal DO for the opportunity to be involved in this patient's care. If you have any questions or concerns, please feel free to contact me at [phone].

## 2022-07-30 NOTE — PROGRESS NOTES
cultures x2 pending  Treatment: ceftriaxone, 1 L NS given in ED, monitor labs    Thank You Davy Matt RN, [email or web address]  Options provided:  -- Sepsis due to acute cystitis, present on admission  -- Sepsis was ruled out  -- Other - I will add my own diagnosis  -- Disagree - Not applicable / Not valid  -- Disagree - Clinically unable to determine / Unknown  -- Refer to Clinical Documentation Reviewer    PROVIDER RESPONSE TEXT:    This patient has sepsis due to acute cystitis which was present on admission. Query created by: Margarita Mustafa on 7/30/2022 9:46 AM      Electronically signed by:   Dewayne Bryant 7/30/2022 10:48 AM

## 2022-07-30 NOTE — PLAN OF CARE
Problem: Pain  Goal: Verbalizes/displays adequate comfort level or baseline comfort level  Outcome: Progressing     Problem: ABCDS Injury Assessment  Goal: Absence of physical injury  Outcome: Progressing     Problem: Discharge Planning  Goal: Discharge to home or other facility with appropriate resources  Outcome: Progressing  Flowsheets (Taken 7/29/2022 2893)  Discharge to home or other facility with appropriate resources:   Identify barriers to discharge with patient and caregiver   Arrange for needed discharge resources and transportation as appropriate   Arrange for interpreters to assist at discharge as needed   Refer to discharge planning if patient needs post-hospital services based on physician order or complex needs related to functional status, cognitive ability or social support system   Identify discharge learning needs (meds, wound care, etc)     Problem: Safety - Adult  Goal: Free from fall injury  7/30/2022 0008 by Torres Mahoney RN  Outcome: Progressing

## 2022-07-31 NOTE — PROGRESS NOTES
Pt discharged in stable condition. Discharge medications and instructions reviewed with patient . All questions answered Pt left via WC to private car.

## 2022-08-01 ENCOUNTER — CARE COORDINATION (OUTPATIENT)
Dept: CASE MANAGEMENT | Age: 37
End: 2022-08-01

## 2022-08-01 ENCOUNTER — TELEPHONE (OUTPATIENT)
Dept: FAMILY MEDICINE CLINIC | Age: 37
End: 2022-08-01

## 2022-08-01 LAB
BLOOD CULTURE, ROUTINE: NORMAL
CULTURE, BLOOD 2: NORMAL

## 2022-08-01 NOTE — TELEPHONE ENCOUNTER
[facility] Transitions Initial Follow Up Call    Outreach made within 2 business days of discharge: Yes    Patient: Ian Julian Patient : 1985   MRN: [id number]  Reason for Admission: There are no discharge diagnoses documented for the most recent discharge. Discharge Date: 22       Spoke with: patient    Discharge department/facility: [facility]    Non-face-to-face services provided:  Scheduled appointment with PCP-22  Obtained and reviewed discharge summary and/or continuity of care documents    TCM Interactive Patient Contact:  Was patient able to fill all prescriptions: Yes  Was patient instructed to bring all medications to the follow-up visit: Yes  Is patient taking all medications as directed in the discharge summary?  Yes  Does patient understand their discharge instructions: Yes  Does patient have questions or concerns that need addressed prior to 7-14 day follow up office visit: no    Scheduled appointment with PCP within 7-14 days    Follow Up  Future Appointments   Date Time Provider Ozzy Ray   2022  4:00 PM Deepthi Herrera [address] [place] [address], AHMET

## 2022-08-01 NOTE — CARE COORDINATION
[facility] Trans[facility] Initial Follow Up Call    Call within 2 business days of discharge: Yes    Patient: Colby Berman Patient : 1985   MRN: [id number]  Reason for Admission: Readmit to [address] kidney stone stent removal   Discharge Date: 22 RARS: Readmission Risk Score: 9.4      Last Discharge [facility]       Date Complaint Diagnosis Description Type Department Provider    22 Post-op Problem Obstructive uropathy . .. ED to Hosp-Admission (Discharged) (ADMITTED) Bandar Neves MD; Bird Woodruff DO             Attempted to reach patient via phone for initial post hospital transition call. VM left stating purpose of call along with my contact information requesting a return call.           Care Transitions 24 Hour Call    Do you have all of your prescriptions and are they filled?: Yes  Care Transitions Interventions         Follow Up  Future Appointments   Date Time Provider Ozzy Ray   2022  4:00 PM DO Judy Rojas [address]     Miky PATN, RN, [facility]  Care Transition Nurse  [phone] mobile

## 2022-08-02 ENCOUNTER — CARE COORDINATION (OUTPATIENT)
Dept: CASE MANAGEMENT | Age: 37
End: 2022-08-02

## 2022-08-02 NOTE — CARE COORDINATION
[facility] Transitions Follow Up Call    2022    Patient: Nigel Gonzalez  Patient : 1985   MRN: [id number]  Reason for Admission: kidney stone and stent removal   Discharge Date: 22 RARS: Readmission Risk Score: 9.4         Spoke with: Nigel Gonzalez    CTN spoke with patient who reported she is feeling better after having ureter stent removed. Patient reported she is urinating well and denied any fever or chills. Patient denied any pain. Patient to contact urologist and setup follow up apt. Patient has apt with PCP on . Patient declined the need for any further calls at this time. CTN advised patient of use of urgent care or physicians 24 hr access line if assistance is needed after hours. Care Transitions Subsequent and Final Call    Subsequent and Final Calls  Do you have any ongoing symptoms?: No  Have your medications changed?: No  Do you have any questions related to your medications?: No  Do you currently have any active services?: No  Do you have any needs or concerns that I can assist you with?: No  Identified Barriers: None  Care Transitions Interventions  Other Interventions:              Follow Up  Future Appointments   Date Time Provider Ozzy Ray   2022  4:00  [facility]     Caroline PATN, RN, [facility]  Care Transition Nurse  [phone] mobile

## 2022-08-03 NOTE — PROGRESS NOTES
Physician Progress Note      Danitza Man  CSN #:                  [id number]  :                       1985  ADMIT DATE:       2022 3:46 PM  [address] DATE:        2022 3:55 PM  RESPONDING  PROVIDER #:        Rian Davis          QUERY TEXT:    Patient admitted with flank pain. Noted documentation of sepsis that was   subsequently dropped and \"SIRS 2/2 above as well as acute cystitis\" in DC   Summary. If possible, please document in progress notes and discharge summary   if you are evaluating and /or treating any of the following: The medical record reflects the following:  Risk Factors: recent ureteral stent  Clinical Indicators: \"doubt UTI with recent negative culture\" per Nephrology,   lactic acid 2.3, WBC 15, UA + nitrite, \"Urine culture grew contaminants\",   Rocephin given in ED but no antibiotics given at discharge  Treatment: ureteral stent removal, Urology consult, IV Rocephin in ED, UA and   cultures, serial labs, supportive care    Thank you,  Yaneth Hurst RN, [employer]  [email or web address]  Options provided:  -- SIRS due to Acute cystitis with Sepsis and UTI ruled out  -- Sepsis due to UTI confirmed  -- Other - I will add my own diagnosis  -- Disagree - Not applicable / Not valid  -- Disagree - Clinically unable to determine / Unknown  -- Refer to Clinical Documentation Reviewer    PROVIDER RESPONSE TEXT:    SIRS of non infectious origin    Query created by: Angelica Weller on 2022 1:33 PM      Electronically signed by:   Dewayne Bryant 8/3/2022 11:56 AM

## 2022-08-09 ENCOUNTER — OFFICE VISIT (OUTPATIENT)
Dept: FAMILY MEDICINE CLINIC | Age: 37
End: 2022-08-09
Payer: COMMERCIAL

## 2022-08-09 VITALS
OXYGEN SATURATION: 97 % | WEIGHT: 224 LBS | DIASTOLIC BLOOD PRESSURE: 72 MMHG | HEART RATE: 102 BPM | SYSTOLIC BLOOD PRESSURE: 124 MMHG | BODY MASS INDEX: 34.06 KG/M2

## 2022-08-09 DIAGNOSIS — R63.5 WEIGHT GAIN: ICD-10-CM

## 2022-08-09 DIAGNOSIS — Z00.00 PREVENTATIVE HEALTH CARE: Primary | ICD-10-CM

## 2022-08-09 DIAGNOSIS — E55.9 VITAMIN D DEFICIENCY: ICD-10-CM

## 2022-08-09 DIAGNOSIS — G43.109 MIGRAINE WITH AURA AND WITHOUT STATUS MIGRAINOSUS, NOT INTRACTABLE: ICD-10-CM

## 2022-08-09 DIAGNOSIS — Z87.442 PERSONAL HISTORY OF KIDNEY STONES: ICD-10-CM

## 2022-08-09 DIAGNOSIS — Z13.220 SCREENING FOR LIPID DISORDERS: ICD-10-CM

## 2022-08-09 DIAGNOSIS — R53.82 CHRONIC FATIGUE: ICD-10-CM

## 2022-08-09 DIAGNOSIS — R09.82 POSTNASAL DRIP: ICD-10-CM

## 2022-08-09 LAB
A/G RATIO: 1.8 (ref 1.1–2.2)
ALBUMIN SERPL-MCNC: 4.2 G/DL (ref 3.4–5)
ALP BLD-CCNC: 67 U/L (ref 40–129)
ALT SERPL-CCNC: 30 U/L (ref 10–40)
ANION GAP SERPL CALCULATED.3IONS-SCNC: 14 MMOL/L (ref 3–16)
AST SERPL-CCNC: 23 U/L (ref 15–37)
BASOPHILS ABSOLUTE: 0 K/UL (ref 0–0.2)
BASOPHILS RELATIVE PERCENT: 0.5 %
BILIRUB SERPL-MCNC: 0.5 MG/DL (ref 0–1)
BUN BLDV-MCNC: 9 MG/DL (ref 7–20)
CALCIUM SERPL-MCNC: 9.1 MG/DL (ref 8.3–10.6)
CHLORIDE BLD-SCNC: 104 MMOL/L (ref 99–110)
CHOLESTEROL, TOTAL: 181 MG/DL (ref 0–199)
CO2: 25 MMOL/L (ref 21–32)
CREAT SERPL-MCNC: 0.7 MG/DL (ref 0.6–1.1)
EOSINOPHILS ABSOLUTE: 0.2 K/UL (ref 0–0.6)
EOSINOPHILS RELATIVE PERCENT: 3 %
FOLATE: 5.06 NG/ML (ref 4.78–24.2)
GFR AFRICAN AMERICAN: >60
GFR NON-AFRICAN AMERICAN: >60
GLUCOSE BLD-MCNC: 99 MG/DL (ref 70–99)
HCT VFR BLD CALC: 40.3 % (ref 36–48)
HDLC SERPL-MCNC: 49 MG/DL (ref 40–60)
HEMOGLOBIN: 13.5 G/DL (ref 12–16)
LDL CHOLESTEROL CALCULATED: 91 MG/DL
LYMPHOCYTES ABSOLUTE: 1.2 K/UL (ref 1–5.1)
LYMPHOCYTES RELATIVE PERCENT: 19.6 %
MCH RBC QN AUTO: 28.7 PG (ref 26–34)
MCHC RBC AUTO-ENTMCNC: 33.5 G/DL (ref 31–36)
MCV RBC AUTO: 85.9 FL (ref 80–100)
MONOCYTES ABSOLUTE: 0.6 K/UL (ref 0–1.3)
MONOCYTES RELATIVE PERCENT: 9.4 %
NEUTROPHILS ABSOLUTE: 4.2 K/UL (ref 1.7–7.7)
NEUTROPHILS RELATIVE PERCENT: 67.5 %
PDW BLD-RTO: 13 % (ref 12.4–15.4)
PLATELET # BLD: 307 K/UL (ref 135–450)
PMV BLD AUTO: 7.6 FL (ref 5–10.5)
POTASSIUM SERPL-SCNC: 4.3 MMOL/L (ref 3.5–5.1)
RBC # BLD: 4.7 M/UL (ref 4–5.2)
SODIUM BLD-SCNC: 143 MMOL/L (ref 136–145)
TOTAL PROTEIN: 6.6 G/DL (ref 6.4–8.2)
TRIGL SERPL-MCNC: 206 MG/DL (ref 0–150)
TSH REFLEX: 1.5 UIU/ML (ref 0.27–4.2)
VITAMIN B-12: 450 PG/ML (ref 211–911)
VITAMIN D 25-HYDROXY: 28.2 NG/ML
VLDLC SERPL CALC-MCNC: 41 MG/DL
WBC # BLD: 6.2 K/UL (ref 4–11)

## 2022-08-09 PROCEDURE — 99395 PREV VISIT EST AGE 18-39: CPT | Performed by: FAMILY MEDICINE

## 2022-08-09 RX ORDER — ONDANSETRON 4 MG/1
4 TABLET, ORALLY DISINTEGRATING ORAL EVERY 8 HOURS PRN
Qty: 30 TABLET | Refills: 1 | Status: SHIPPED | OUTPATIENT
Start: 2022-08-09

## 2022-08-09 ASSESSMENT — PATIENT HEALTH QUESTIONNAIRE - PHQ9
SUM OF ALL RESPONSES TO PHQ9 QUESTIONS 1 & 2: 1
5. POOR APPETITE OR OVEREATING: 0
10. IF YOU CHECKED OFF ANY PROBLEMS, HOW DIFFICULT HAVE THESE PROBLEMS MADE IT FOR YOU TO DO YOUR WORK, TAKE CARE OF THINGS AT HOME, OR GET ALONG WITH OTHER PEOPLE: 1
3. TROUBLE FALLING OR STAYING ASLEEP: 2
7. TROUBLE CONCENTRATING ON THINGS, SUCH AS READING THE NEWSPAPER OR WATCHING TELEVISION: 0
6. FEELING BAD ABOUT YOURSELF - OR THAT YOU ARE A FAILURE OR HAVE LET YOURSELF OR YOUR FAMILY DOWN: 1
1. LITTLE INTEREST OR PLEASURE IN DOING THINGS: 0
SUM OF ALL RESPONSES TO PHQ QUESTIONS 1-9: 6
2. FEELING DOWN, DEPRESSED OR HOPELESS: 1
8. MOVING OR SPEAKING SO SLOWLY THAT OTHER PEOPLE COULD HAVE NOTICED. OR THE OPPOSITE, BEING SO FIGETY OR RESTLESS THAT YOU HAVE BEEN MOVING AROUND A LOT MORE THAN USUAL: 0
SUM OF ALL RESPONSES TO PHQ QUESTIONS 1-9: 6
SUM OF ALL RESPONSES TO PHQ QUESTIONS 1-9: 6
4. FEELING TIRED OR HAVING LITTLE ENERGY: 2
9. THOUGHTS THAT YOU WOULD BE BETTER OFF DEAD, OR OF HURTING YOURSELF: 0
SUM OF ALL RESPONSES TO PHQ QUESTIONS 1-9: 6

## 2022-08-09 ASSESSMENT — ENCOUNTER SYMPTOMS: NAUSEA: 1

## 2022-08-09 NOTE — PROGRESS NOTES
Lymphadenopathy:      Cervical: No cervical adenopathy. Neurological:      Mental Status: She is alert. Psychiatric:         Mood and Affect: Mood normal.         Behavior: Behavior normal.         Thought Content: Thought content normal.       Current Outpatient Medications   Medication Sig Dispense Refill    ondansetron (ZOFRAN ODT) 4 MG disintegrating tablet Take 1 tablet by mouth every 8 hours as needed for Nausea or Vomiting 30 tablet 1    tamsulosin (FLOMAX) 0.4 MG capsule Take 1 capsule by mouth in the morning. 30 capsule 0    Rimegepant Sulfate (NURTEC) 75 MG TBDP Take 75 mg by mouth as needed (migraine)      eletriptan (RELPAX) 40 MG tablet TAKE ONE-HALF TO ONE TABLET BY MOUTH ONCE AS NEEDED FOR MIGRAINE MAY REPEAT IN 2 HOURS IF NEEDED 6 tablet 5    SUMAtriptan (IMITREX) 100 MG tablet Take 1 tablet by mouth once as needed for Migraine (may repeat 2nd dose within 2 hours. max 2 tabs in 24 hours.) 9 tablet 5    eletriptan (RELPAX) 40 MG tablet TAKE ONE-HALF TO ONE TABLET BY MOUTH ONCE AS NEEDED FOR MIGRAINE MAY REPEAT IN 2 HOURS IF NEEDED 9 tablet 5     No current facility-administered medications for this visit. Assessment:    1. Preventative health care    2. Migraine with aura and without status migrainosus, not intractable    3. Personal history of kidney stones    4. Chronic fatigue    5. Weight gain    6. Postnasal drip    7. Screening for lipid disorders    8. Vitamin D deficiency        Plan:    1. Preventative health care    2. Migraine with aura and without status migrainosus, not intractable  Continue current medications. 3. Personal history of kidney stones  Increase water intake and avoid Topamax. She has Flomax to take for acute kidney stones. - ondansetron (ZOFRAN ODT) 4 MG disintegrating tablet; Take 1 tablet by mouth every 8 hours as needed for Nausea or Vomiting  Dispense: 30 tablet; Refill: 1    4. Chronic fatigue  Will check labs below.   Consider sleep apnea as an etiology. - CBC with Auto Differential; Future  - Comprehensive Metabolic Panel; Future  - Vitamin B12 & Folate; Future  - TSH with Reflex; Future    5. Weight gain  She will try to meet again with the weight loss provider. 6. Postnasal drip  Try Flonase. If symptoms do not improve and bacterial sinusitis symptoms present, I would recommend starting doxycycline or a Z-Ernesto. As symptoms just started this morning, I would not recommend an antibiotic today. 7. Screening for lipid disorders  - Lipid Panel; Future    8. Vitamin D deficiency  - Vitamin D 25 Hydroxy; Future    Return in about 1 year (around 8/9/2023) for Preventative.

## 2022-09-16 ENCOUNTER — PATIENT MESSAGE (OUTPATIENT)
Dept: FAMILY MEDICINE CLINIC | Age: 37
End: 2022-09-16

## 2022-09-19 NOTE — TELEPHONE ENCOUNTER
From: Shelli Horton  To: Dr. Michael Conroy: 9/16/2022 10:24 PM EDT  Subject: Depression and stress     This is basically a cry for immediate help. I'm so depressed and no matter how hard I try I can't lose weight. I have a lot of stress and my weight makes my stress and depression worse. I can't take Topamax anymore because I was getting so many kidney stones. A lot of the depression and anxiety medication I've tried caused me to gain weight or feel crazy. I don't like taking anything, but I need help.

## 2022-09-20 DIAGNOSIS — F41.9 ANXIETY: ICD-10-CM

## 2022-09-20 DIAGNOSIS — F33.2 ENDOGENOUS DEPRESSION (HCC): ICD-10-CM

## 2022-09-20 RX ORDER — FLUOXETINE HYDROCHLORIDE 20 MG/1
20 CAPSULE ORAL DAILY
Qty: 30 CAPSULE | Refills: 0 | Status: SHIPPED | OUTPATIENT
Start: 2022-09-20

## 2022-09-20 NOTE — TELEPHONE ENCOUNTER
I called this patient to schedule an appointment and she said she would like to go back onto a medication that Dr. Porfirio Torrez had her on before but she cannot remember the name of it. I do not have access to that information. Can you call the patient and try to help her please?

## 2022-10-19 ENCOUNTER — TELEMEDICINE (OUTPATIENT)
Dept: PRIMARY CARE CLINIC | Age: 37
End: 2022-10-19
Payer: COMMERCIAL

## 2022-10-19 ENCOUNTER — NURSE ONLY (OUTPATIENT)
Dept: FAMILY MEDICINE CLINIC | Age: 37
End: 2022-10-19
Payer: COMMERCIAL

## 2022-10-19 DIAGNOSIS — Z29.9 PROPHYLACTIC MEASURE: ICD-10-CM

## 2022-10-19 DIAGNOSIS — J02.9 SORE THROAT: ICD-10-CM

## 2022-10-19 DIAGNOSIS — J02.9 SORE THROAT: Primary | ICD-10-CM

## 2022-10-19 LAB — S PYO AG THROAT QL: NORMAL

## 2022-10-19 PROCEDURE — 87880 STREP A ASSAY W/OPTIC: CPT | Performed by: NURSE PRACTITIONER

## 2022-10-19 PROCEDURE — 99213 OFFICE O/P EST LOW 20 MIN: CPT | Performed by: NURSE PRACTITIONER

## 2022-10-19 RX ORDER — PREDNISONE 20 MG/1
20 TABLET ORAL 2 TIMES DAILY
Qty: 10 TABLET | Refills: 0 | Status: SHIPPED | OUTPATIENT
Start: 2022-10-19 | End: 2022-10-24

## 2022-10-19 RX ORDER — AZITHROMYCIN 250 MG/1
250 TABLET, FILM COATED ORAL SEE ADMIN INSTRUCTIONS
Qty: 6 TABLET | Refills: 0 | Status: SHIPPED | OUTPATIENT
Start: 2022-10-19 | End: 2022-10-24

## 2022-10-19 RX ORDER — FLUCONAZOLE 100 MG/1
100 TABLET ORAL DAILY
Qty: 1 TABLET | Refills: 0 | Status: SHIPPED | OUTPATIENT
Start: 2022-10-19 | End: 2022-10-20

## 2022-10-19 ASSESSMENT — ENCOUNTER SYMPTOMS
VOICE CHANGE: 1
SORE THROAT: 1

## 2022-10-19 NOTE — PROGRESS NOTES
Mann Horton (:  1985) is a Established patient, here for evaluation of the following:Last week Friday started with postnasal drip which progressed to a sore throat with blisters noted on the left side in the back of the throat, hoarse voice, nasal congestion. Has a history of sinus issues X2 at home COVID test were negative    Assessment & Plan   Below is the assessment and plan developed based on review of pertinent history, physical exam, labs, studies, and medications. 1. Sore throat  Problem  -     POCT rapid strep A; Future--none detected  -     Culture, Throat; Future-we will call results when available  Azithromycin 250 mg take 2 tabs on day 1, 1 tab days 2 through 5 #6 no refills  Prednisone 20 mg 1 tablet twice daily x5 days #10 no refill    SARS-CoV-2 Not detected Not Detected    Sent message in my chart re results of COVID not detected    See patient instructions      2. Prophylactic measure  Problem  Diflucan 100 mg tablet 1 tablet prophylactically after finishing antibiotic    Strep A Ag None Detected None Detected      Follow-up with PCP if symptoms do not improve or if they worsen  We will call results of throat culture to you but I prescribed an antibiotic due to the blisters in back of the throat per patient. Subjective   This is a 64-year-old female patient of Dr. Claudette Lowery consenting to a virtual visit today  She states that last Friday she started with postnasal drip and a hoarse voice which progressively worsened to a sore throat and then she noticed blisters on the left side in the back of her throat. She is also complaining of nasal congestion and a tickle in the throat. She has done X2 at home COVID-19 tests and they were negative. She has been treating herself with her Flonase, NeoMed sinus rinse, Mucinex, and antihistamine oral.  These are not helping much. I called her earlier to go to the office to get a rapid strep and that was nondetected.     Review of Systems HENT:  Positive for congestion, postnasal drip, sore throat and voice change (Hoarse voice). All other systems reviewed and are negative. Objective   Patient-Reported Vitals  No data recorded     Physical Exam  [INSTRUCTIONS:  \"[x]\" Indicates a positive item  \"[]\" Indicates a negative item  -- DELETE ALL ITEMS NOT EXAMINED]    Constitutional: [x] Appears well-developed and well-nourished [x] No apparent distress      [] Abnormal -     Mental status: [x] Alert and awake  [x] Oriented to person/place/time [x] Able to follow commands    [] Abnormal -     Eyes:   EOM    [x]  Normal    [] Abnormal -   Sclera  [x]  Normal    [] Abnormal -          Discharge [x]  None visible   [] Abnormal -     HENT: [x] Normocephalic, atraumatic  [] Abnormal -   [] Mouth/Throat: Mucous membranes are moist    External Ears [] Normal  [] Abnormal -    Neck: [x] No visualized mass [] Abnormal -     Pulmonary/Chest: [x] Respiratory effort normal   [x] No visualized signs of difficulty breathing or respiratory distress        [] Abnormal -      Musculoskeletal:   [] Normal gait with no signs of ataxia         [x] Normal range of motion of neck        [] Abnormal -     Neurological:        [x] No Facial Asymmetry (Cranial nerve 7 motor function) (limited exam due to video visit)          [] No gaze palsy        [] Abnormal -          Skin:        [x] No significant exanthematous lesions or discoloration noted on facial skin         [] Abnormal -            Psychiatric:       [x] Normal Affect [] Abnormal -             On this date 10/19/2022 I have spent 20 minutes reviewing previous notes, test results and face to face (virtual) with the patient discussing the diagnosis and importance of compliance with the treatment plan as well as documenting on the day of the visit. Eladia Monday, was evaluated through a synchronous (real-time) audio-video encounter.  The patient (or guardian if applicable) is aware that this is a billable service, which includes applicable co-pays. This Virtual Visit was conducted with patient's (and/or legal guardian's) consent. The visit was conducted pursuant to the emergency declaration under the [address] [place] [address] authority and the [email or web address] and [employer] General Act. Patient identification was verified, and a caregiver was present when appropriate. The patient was [place] [id number].    [facility] Souleymane [id number], APRN - CNP

## 2022-10-20 LAB — SARS-COV-2: NOT DETECTED

## 2022-10-22 LAB — THROAT CULTURE: NORMAL

## 2022-11-07 ENCOUNTER — TELEMEDICINE (OUTPATIENT)
Dept: FAMILY MEDICINE CLINIC | Age: 37
End: 2022-11-07
Payer: COMMERCIAL

## 2022-11-07 DIAGNOSIS — F41.9 ANXIETY: ICD-10-CM

## 2022-11-07 DIAGNOSIS — F33.2 ENDOGENOUS DEPRESSION (HCC): Primary | ICD-10-CM

## 2022-11-07 DIAGNOSIS — E66.09 CLASS 1 OBESITY DUE TO EXCESS CALORIES WITHOUT SERIOUS COMORBIDITY WITH BODY MASS INDEX (BMI) OF 30.0 TO 30.9 IN ADULT: ICD-10-CM

## 2022-11-07 DIAGNOSIS — G43.109 MIGRAINE WITH AURA AND WITHOUT STATUS MIGRAINOSUS, NOT INTRACTABLE: ICD-10-CM

## 2022-11-07 DIAGNOSIS — Z87.442 PERSONAL HISTORY OF KIDNEY STONES: ICD-10-CM

## 2022-11-07 PROCEDURE — 99214 OFFICE O/P EST MOD 30 MIN: CPT | Performed by: FAMILY MEDICINE

## 2022-11-07 RX ORDER — FLUOXETINE HYDROCHLORIDE 20 MG/1
20 CAPSULE ORAL DAILY
Qty: 90 CAPSULE | Refills: 0 | Status: SHIPPED | OUTPATIENT
Start: 2022-11-07 | End: 2022-11-30

## 2022-11-07 NOTE — PROGRESS NOTES
Benny Sanderson is a 42 y.o. female    Chief Complaint   Patient presents with    Medication Refill     Prozac    Migraine    Obesity       HPI:    The patient was evaluated through a synchronous (real-time) audio-video encounter. The patient (or guardian if applicable) is aware that this is a billable service, which includes applicable co-pays. This Virtual Visit was conducted with patient's (and/or legal guardian's) consent. The visit was conducted pursuant to the emergency declaration under the [address] [place] [address] and the [employer] and [employer] Act. Patient identification was verified, and a caregiver was present when appropriate. The patient was located at home. Provider was located at [facility] (Appt Dept): [address] [place] [address] [address] [facility],  [address]. The patient was evaluated through a synchronous (real-time) audio-video encounter. The patient (or guardian if applicable) is aware that this is a billable service, which includes applicable co-pays. This Virtual Visit was conducted with patient's (and/or legal guardian's) consent. The visit was conducted pursuant to the emergency declaration under the [address] [place] [address] and the [employer] and [employer] Act. Patient identification was verified, and a caregiver was present when appropriate. The patient was located at home. Provider was located at [facility] (Appt Dept): [facility]  [address] [facility] [address] [facility],  [address]. Migraine   This is a chronic problem. The current episode started more than 1 year ago. The problem occurs intermittently. The problem has been waxing and waning. The quality of the pain is described as pulsating. Associated symptoms include nausea. Pertinent negatives include no fever. Nothing aggravates the symptoms.  She has tried triptans for the symptoms. The treatment provided moderate relief. Her past medical history is significant for migraine headaches. Obesity. This is a chronic issue. She wanted her thyroid checked on the last visit and it was normal.  She lost a little weight with Topamax but it caused kidney stone so she came off of it. She would like to reestablish with a weight loss provider. Anxiety/depression. This is a chronic issue. She has recently started Prozac. She does notice a difference in her mood. No major side effects. She is currently happy with the dose. ROS:    Review of Systems   Psychiatric/Behavioral:  Negative for sleep disturbance. The patient is not nervous/anxious. LMP 03/14/2019     Physical Exam:    Physical Exam  Constitutional:       General: She is not in acute distress. Appearance: Normal appearance. She is obese. She is not ill-appearing or toxic-appearing. HENT:      Head: Normocephalic. Neurological:      Mental Status: She is alert. Psychiatric:         Mood and Affect: Mood normal.         Behavior: Behavior normal.         Thought Content: Thought content normal.       Current Outpatient Medications   Medication Sig Dispense Refill    FLUoxetine (PROZAC) 20 MG capsule Take 1 capsule by mouth daily 90 capsule 0    ondansetron (ZOFRAN ODT) 4 MG disintegrating tablet Take 1 tablet by mouth every 8 hours as needed for Nausea or Vomiting 30 tablet 1    tamsulosin (FLOMAX) 0.4 MG capsule Take 1 capsule by mouth in the morning. 30 capsule 0    Rimegepant Sulfate (NURTEC) 75 MG TBDP Take 75 mg by mouth as needed (migraine)      eletriptan (RELPAX) 40 MG tablet TAKE ONE-HALF TO ONE TABLET BY MOUTH ONCE AS NEEDED FOR MIGRAINE MAY REPEAT IN 2 HOURS IF NEEDED 6 tablet 5    SUMAtriptan (IMITREX) 100 MG tablet Take 1 tablet by mouth once as needed for Migraine (may repeat 2nd dose within 2 hours.  max 2 tabs in 24 hours.) 9 tablet 5    eletriptan (RELPAX) 40 MG tablet TAKE ONE-HALF TO ONE TABLET BY MOUTH ONCE AS NEEDED FOR MIGRAINE MAY REPEAT IN 2 HOURS IF NEEDED 9 tablet 5     No current facility-administered medications for this visit. 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 Anxiety    3. Class 1 obesity due to excess calories without serious comorbidity with body mass index (BMI) of 30.0 to 30.9 in adult    4. Migraine with aura and without status migrainosus, not intractable    5. 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 Continue current medications.   - FLUoxetine (PROZAC) 20 MG capsule; Take 1 capsule by mouth daily  Dispense: 90 capsule; Refill: 0    2. Anxiety  - FLUoxetine (PROZAC) 20 MG capsule; Take 1 capsule by mouth daily  Dispense: 90 capsule; Refill: 0    3. Class 1 obesity due to excess calories without serious comorbidity with body mass index (BMI) of 30.0 to 30.9 in adult  - Aspen Vincent MD, Bariatric Surgery, (Virtual Visits Only)    4. Migraine with aura and without status migrainosus, not intractable  Discussed trying Botox. Topamax caused kidney stones so Botox would be a better option. Encourage supplementation with folate. 5. Personal history of kidney stones  Discussed to increase calcium in diet and to eat calcium when consuming oxalates. Discussed how vitamin D can increase the risk for kidney stones during the first year but usually helps during the subsequent years. Patient to return to clinic if symptoms worsen or fail to improve.

## 2022-11-11 ENCOUNTER — TELEPHONE (OUTPATIENT)
Dept: FAMILY MEDICINE CLINIC | Age: 37
End: 2022-11-11

## 2022-11-11 ENCOUNTER — OFFICE VISIT (OUTPATIENT)
Dept: BARIATRICS/WEIGHT MGMT | Age: 37
End: 2022-11-11

## 2022-11-11 VITALS
BODY MASS INDEX: 34.23 KG/M2 | WEIGHT: 213 LBS | DIASTOLIC BLOOD PRESSURE: 83 MMHG | SYSTOLIC BLOOD PRESSURE: 124 MMHG | HEIGHT: 66 IN

## 2022-11-11 DIAGNOSIS — E66.9 OBESITY (BMI 30.0-34.9): Primary | ICD-10-CM

## 2022-11-11 PROBLEM — K25.9 STOMACH ULCER: Status: ACTIVE | Noted: 2022-11-11

## 2022-11-11 PROBLEM — T88.59XA PROLONGED EMERGENCE FROM GENERAL ANESTHESIA: Status: ACTIVE | Noted: 2022-11-11

## 2022-11-11 PROBLEM — K58.9 IBS (IRRITABLE BOWEL SYNDROME): Status: ACTIVE | Noted: 2022-11-11

## 2022-11-11 PROBLEM — Z01.818 PREOPERATIVE CLEARANCE: Status: ACTIVE | Noted: 2022-11-11

## 2022-11-11 PROBLEM — N97.9 INFERTILITY, FEMALE: Status: ACTIVE | Noted: 2022-11-11

## 2022-11-11 PROBLEM — F32.A DEPRESSION: Status: ACTIVE | Noted: 2017-01-16

## 2022-11-11 PROBLEM — E66.01 MORBID OBESITY DUE TO EXCESS CALORIES (HCC): Status: ACTIVE | Noted: 2022-11-11

## 2022-11-11 PROBLEM — R32 URINARY INCONTINENCE: Status: ACTIVE | Noted: 2022-11-11

## 2022-11-11 PROBLEM — N80.9 ENDOMETRIOSIS: Status: ACTIVE | Noted: 2022-11-11

## 2022-11-11 PROCEDURE — 99999 PR OFFICE/OUTPT VISIT,PROCEDURE ONLY: CPT | Performed by: FAMILY MEDICINE

## 2022-11-11 NOTE — PROGRESS NOTES
Argentina Luis is a 42 y.o. female with a date of birth of 1985. Vitals:    11/11/22 1121   BP: 124/83   Site: Left Upper Arm   Weight: 213 lb (96.6 kg)   Height: 5' 6\" (1.676 m)    BMI: Body mass index is 34.38 kg/m². Obesity Classification: Class II    Weight History: Wt Readings from Last 3 Encounters:   11/11/22 213 lb (96.6 kg)   08/09/22 224 lb (101.6 kg)   07/30/22 224 lb 9.6 oz (101.9 kg)     Patient's lowest adult weight was 130 lb at age 23. Patient's highest adult weight was 220 lb at age 25. Patient has participated in the following weight loss programs: Atkins Diet, [employer], , Keto, Nutrition Counseling, [facility], Fasting and Faster Way to Wt Loss. Patient has participated in meal replacement/liquid diets. Patient has participated in weight loss medications. Patient is not lactose intolerant. Patient does not have Bahai/cultural food concerns. Patient does not have food allergies. Avoids spinach, spring greens, asparagus / greens / added salt / limits protein / pepper d/t kidney stones. 24 hour recall/food frequency chart:  Eats btw 11a-7p, practices intermittent fasting On the weekend fast longer maybe 24 hour or 1p-7p  Breakfast: yes. protein shake  Snack: no.  Lunch: 6oz meat or eggs w/ 1 c veggies   Snack: yes. smart popcorn OR apple & PB  Dinner: yes. 8oz meat  OR eggs w/ 1 cup veggies & 1/2-1 cup berries  Snack: no.  Drinks throughout the day: water / sometimes spark / sometimes a sip of pop  Do you drink alcohol? yes. 3-5 glasses of wine OR vikas per year    Patient does not meet the criteria for binge eating disorder. Patient does not have grazing. Patient does not have night eating. Patient does have a history of emotional eating or eating out of boredom.       Goals  Weight: 150 lb  Health Improvement: more energy / migraines     Assessment  Nutritional Needs: RMR=(9.99 x 102) + (6.25 x 173) - (4.92 x 37 y.o.) -161 = 1747 kcal x 1.35 (sedentary to moderate activity factor)= 2358 kcal - 1000 (for 2 lb weight loss/week)= 1358 kcal.    Plan  Plan/Recommendations: Start 1200 lester LCMP  Optifast:  not interested  Diet Medications:  interested  Pt fast, eating window is 11a-7p. Pt tracks intake on Faster Way to wt loss & works with . PES Statement:  Overweight/Obesity related to unbalanced intake vs energy expenditure as evidenced by BMI. Body mass index is 34.38 kg/m². Will follow up as necessary.     Damian Miranda RD, LD

## 2022-11-11 NOTE — TELEPHONE ENCOUNTER
Patient had called about setting up labs but we havent received the Labs from the weight center yet and she doesn't have a hard copy of labs needed, so I told her we would have to set up the appointment after we received the lab orders

## 2022-11-12 ENCOUNTER — TELEMEDICINE (OUTPATIENT)
Dept: BARIATRICS/WEIGHT MGMT | Age: 37
End: 2022-11-12
Payer: COMMERCIAL

## 2022-11-12 DIAGNOSIS — E66.9 CLASS 1 OBESITY: Primary | ICD-10-CM

## 2022-11-12 DIAGNOSIS — Z71.3 DIETARY COUNSELING AND SURVEILLANCE: ICD-10-CM

## 2022-11-12 PROCEDURE — 99214 OFFICE O/P EST MOD 30 MIN: CPT | Performed by: FAMILY MEDICINE

## 2022-11-12 ASSESSMENT — ENCOUNTER SYMPTOMS
BLOOD IN STOOL: 0
PHOTOPHOBIA: 0
SHORTNESS OF BREATH: 0
VOMITING: 0
COUGH: 0
DIARRHEA: 0
EYE PAIN: 0
WHEEZING: 0
CONSTIPATION: 0
CHOKING: 0
ABDOMINAL PAIN: 0
CHEST TIGHTNESS: 0
APNEA: 0
NAUSEA: 0
ABDOMINAL DISTENTION: 0

## 2022-11-15 ENCOUNTER — HOSPITAL ENCOUNTER (OUTPATIENT)
Age: 37
Discharge: HOME | End: 2022-11-15
Payer: COMMERCIAL

## 2022-11-15 DIAGNOSIS — E66.9 CLASS 1 OBESITY: ICD-10-CM

## 2022-11-15 LAB
A/G RATIO: 2.1 (ref 1.1–2.2)
ALBUMIN SERPL-MCNC: 4.9 G/DL (ref 3.4–5)
ALP BLD-CCNC: 78 U/L (ref 40–129)
ALT SERPL-CCNC: 25 U/L (ref 10–40)
ANION GAP SERPL CALCULATED.3IONS-SCNC: 14 MMOL/L (ref 3–16)
AST SERPL-CCNC: 22 U/L (ref 15–37)
BILIRUB SERPL-MCNC: 0.6 MG/DL (ref 0–1)
BUN BLDV-MCNC: 13 MG/DL (ref 7–20)
CALCIUM SERPL-MCNC: 9.1 MG/DL (ref 8.3–10.6)
CHLORIDE BLD-SCNC: 100 MMOL/L (ref 99–110)
CO2: 25 MMOL/L (ref 21–32)
CREAT SERPL-MCNC: 0.6 MG/DL (ref 0.6–1.1)
FOLATE: >20 NG/ML (ref 4.78–24.2)
GFR SERPL CREATININE-BSD FRML MDRD: >60 ML/MIN/{1.73_M2}
GLUCOSE BLD-MCNC: 91 MG/DL (ref 70–99)
POTASSIUM SERPL-SCNC: 4 MMOL/L (ref 3.5–5.1)
SODIUM BLD-SCNC: 139 MMOL/L (ref 136–145)
TOTAL PROTEIN: 7.2 G/DL (ref 6.4–8.2)
TSH REFLEX: 0.84 UIU/ML (ref 0.27–4.2)
VITAMIN B-12: 567 PG/ML (ref 211–911)
VITAMIN D 25-HYDROXY: 26 NG/ML

## 2022-11-15 PROCEDURE — 36415 COLL VENOUS BLD VENIPUNCTURE: CPT

## 2022-11-15 PROCEDURE — 82607 VITAMIN B-12: CPT

## 2022-11-15 PROCEDURE — 82746 ASSAY OF FOLIC ACID SERUM: CPT

## 2022-11-15 PROCEDURE — 82306 VITAMIN D 25 HYDROXY: CPT

## 2022-11-15 PROCEDURE — 84443 ASSAY THYROID STIM HORMONE: CPT

## 2022-11-15 PROCEDURE — 83036 HEMOGLOBIN GLYCOSYLATED A1C: CPT

## 2022-11-15 PROCEDURE — 80053 COMPREHEN METABOLIC PANEL: CPT

## 2022-11-16 LAB
ESTIMATED AVERAGE GLUCOSE: 105.4 MG/DL
HBA1C MFR BLD: 5.3 %

## 2022-11-30 ENCOUNTER — TELEMEDICINE (OUTPATIENT)
Dept: BARIATRICS/WEIGHT MGMT | Age: 37
End: 2022-11-30

## 2022-11-30 DIAGNOSIS — Z71.3 DIETARY COUNSELING AND SURVEILLANCE: ICD-10-CM

## 2022-11-30 DIAGNOSIS — E66.9 CLASS 1 OBESITY: Primary | ICD-10-CM

## 2022-11-30 DIAGNOSIS — E55.9 VITAMIN D INSUFFICIENCY: ICD-10-CM

## 2022-11-30 ASSESSMENT — ENCOUNTER SYMPTOMS
PHOTOPHOBIA: 0
VOMITING: 0
NAUSEA: 0
ABDOMINAL DISTENTION: 0
EYE PAIN: 0
CHOKING: 0
CONSTIPATION: 0
BLOOD IN STOOL: 0
ABDOMINAL PAIN: 0
SHORTNESS OF BREATH: 0
WHEEZING: 0
CHEST TIGHTNESS: 0
DIARRHEA: 0
APNEA: 0
COUGH: 0

## 2022-11-30 NOTE — PROGRESS NOTES
Start Patient: Eladia Monday                      Encounter Date: 11/30/2022    YOB: 1985                Age: 42 y.o. Chief Complaint   Patient presents with    Weight Management     F/u MWM           Patient identification was verified at the start of the visit. Patient-Reported Vitals 11/12/2022   Patient-Reported Weight 212   Patient-Reported Height 5'7\"   Patient-Reported Temperature -         BP Readings from Last 1 Encounters:   11/11/22 124/83       BMI Readings from Last 1 Encounters:   11/11/22 34.38 kg/m²       Pulse Readings from Last 1 Encounters:   08/09/22 (!) 102          Wt Readings from Last 3 Encounters:   11/11/22 213 lb (96.6 kg)   08/09/22 224 lb (101.6 kg)   07/30/22 224 lb 9.6 oz (101.9 kg)        HPI: 42 y.o. female with a long-standing history of obesity presents today for virtual video follow-up. Her weight is stable from her last visit. Trying to make better dietary choices. Labs and EKG completed. EKG was reportedly abnormal, but no results available in her chart. Interested in aom to help with appetite regulation/cravings.          Allergies   Allergen Reactions    Fentanyl Nausea And Vomiting     She reports it made her extremely sick and did not help with pain, made her feel like she wanted to die    Topamax [Topiramate] Other (See Comments)     Kidney stones    Cymbalta [Duloxetine Hcl] Other (See Comments)     moodiness         Current Outpatient Medications:     SUMAtriptan Succinate (IMITREX PO), Take by mouth, Disp: , Rfl:     ondansetron (ZOFRAN ODT) 4 MG disintegrating tablet, Take 1 tablet by mouth every 8 hours as needed for Nausea or Vomiting, Disp: 30 tablet, Rfl: 1    tamsulosin (FLOMAX) 0.4 MG capsule, Take 1 capsule by mouth in the morning., Disp: 30 capsule, Rfl: 0    Rimegepant Sulfate (NURTEC) 75 MG TBDP, Take 75 mg by mouth as needed (migraine), Disp: , Rfl:     eletriptan (RELPAX) 40 MG tablet, TAKE ONE-HALF TO ONE TABLET BY MOUTH ONCE AS NEEDED FOR MIGRAINE MAY REPEAT IN 2 HOURS IF NEEDED, Disp: 6 tablet, Rfl: 5    SUMAtriptan (IMITREX) 100 MG tablet, Take 1 tablet by mouth once as needed for Migraine (may repeat 2nd dose within 2 hours. max 2 tabs in 24 hours.), Disp: 9 tablet, Rfl: 5    Patient Active Problem List   Diagnosis    Migraines    Interstitial cystitis    Allergic sinusitis    Kidney stones    PCOS (polycystic ovarian syndrome)    Depression    Anxiety    Ectopic pregnancy    Obesity (BMI 30.0-34. 9)    Hydroureteronephrosis    Ureterovesical junction (UVJ) obstruction    Acquired ureterovesical junction (UVJ) obstruction    Obstructive uropathy    History of migraine    SIRS (systemic inflammatory response syndrome) (HCC)    Prolonged emergence from general anesthesia    Stomach ulcer    Urinary incontinence    Endometriosis    IBS (irritable bowel syndrome)    Infertility, female    Preoperative clearance    Morbid obesity due to excess calories ([address]       Review of Systems   Constitutional:  Negative for fatigue. Eyes:  Negative for photophobia, pain and visual disturbance. Respiratory:  Negative for apnea, cough, choking, chest tightness, shortness of breath and wheezing. Cardiovascular:  Negative for chest pain, palpitations and leg swelling. Gastrointestinal:  Negative for abdominal distention, abdominal pain, blood in stool, constipation, diarrhea, nausea and vomiting. Endocrine: Negative for cold intolerance and heat intolerance. Musculoskeletal:  Negative for arthralgias and myalgias. Skin:  Negative for rash. Neurological:  Negative for dizziness, tremors, syncope, weakness, numbness and headaches. Psychiatric/Behavioral:  Negative for agitation, confusion, decreased concentration, dysphoric mood, hallucinations, sleep disturbance and suicidal ideas. The patient is not nervous/anxious and is not hyperactive. Physical Exam  Constitutional:       Appearance: She is well-developed.    HENT:      Head: Normocephalic. Eyes:      Conjunctiva/sclera: Conjunctivae normal.   Abdominal:      General: Abdomen is protuberant. Musculoskeletal:         General: No swelling. Neurological:      Mental Status: She is alert and oriented to person, place, and time. Psychiatric:         Mood and Affect: Mood normal.         Behavior: Behavior normal.         Thought Content: Thought content normal.         Judgment: Judgment normal.       Hospital Outpatient Visit on 11/15/2022   Component Date Value Ref Range Status    Vit D, 25-Hydroxy 11/15/2022 26.0 (A)  >=30 ng/mL Final    Comment: <=20 ng/mL. ........... Annelle Castles Deficient  21-29 ng/mL. ......... Annelle Castles Insufficient  >=30 ng/mL. ........ Annelle Castles Sufficient      Sodium 11/15/2022 139  136 - 145 mmol/L Final    Potassium 11/15/2022 4.0  3.5 - 5.1 mmol/L Final    Chloride 11/15/2022 100  99 - 110 mmol/L Final    CO2 11/15/2022 25  21 - 32 mmol/L Final    Anion Gap 11/15/2022 14  3 - 16 Final    Glucose 11/15/2022 91  70 - 99 mg/dL Final    BUN 11/15/2022 13  7 - 20 mg/dL Final    Creatinine 11/15/2022 0.6  0.6 - 1.1 mg/dL Final    Est, Glom Filt Rate 11/15/2022 >60  >60 Final    Comment: Pediatric calculator link  [email or web address]  Effective Oct 3, 2022  These results are not intended for use in patients  <25years of age. eGFR results are calculated without  a race factor using the 2021 CKD-EPI equation. Careful  clinical correlation is recommended, particularly when  comparing to results calculated using previous equations. The CKD-EPI equation is less accurate in patients with  extremes of muscle mass, extra-renal metabolism of  creatinine, excessive creatinine ingestion, or following  therapy that affects renal tubular secretion.       Calcium 11/15/2022 9.1  8.3 - 10.6 mg/dL Final    Total Protein 11/15/2022 7.2  6.4 - 8.2 g/dL Final    Albumin 11/15/2022 4.9  3.4 - 5.0 g/dL Final    Albumin/Globulin Ratio 11/15/2022 2.1  1.1 - 2.2 Final Total Bilirubin 11/15/2022 0.6  0.0 - 1.0 mg/dL Final    Alkaline Phosphatase 11/15/2022 78  40 - 129 U/L Final    ALT 11/15/2022 25  10 - 40 U/L Final    AST 11/15/2022 22  15 - 37 U/L Final    Hemoglobin A1C 11/15/2022 5.3  See comment % Final    Comment: Comment:  Diagnosis of Diabetes: > or = 6.5%  Increased risk of diabetes (Prediabetes): 5.7-6.4%  Glycemic Control: Nonpregnant Adults: <7.0%                    Pregnant: <6.0%        eAG 11/15/2022 105.4  mg/dL Final    Vitamin B-12 11/15/2022 567  211 - 911 pg/mL Final    Folate 11/15/2022 >20.00  4.78 - 24.20 ng/mL Final    Comment: Effective 11-15-16 10:00am EST  Please note reference ranges have  changed for Folate. TSH 11/15/2022 0.84  0.27 - 4.20 uIU/mL Final         Assessment and Plan:  1. Class 1 obesity  Stable. Continue low carb/lester meal plan. Request EKG results. Discussed starting Saxenda- pt wants to hold off and start with oral medication first.   She is not on an SSRI (was prescribed prozac by PCP, pt never started tx). Plan to start Contrave at next visit. 2. Dietary counseling and surveillance  Low carb/lester meal plan. Avoid skipping meals. Avoid evening/nighttime snacking. Use distraction techniques to avoid boredom/stress eating. Plan/prep meals ahead of time. Avoid soda/juice/sugary drinks. Be mindful of portion sizes. 3. Vitamin D insufficiency  Start Vit D3 2,000 IU daily.         Nutrition Plan: [] LCHF/Ketogenic   [x] Modified low-calorie diet (low carb/low-lester)               [] Low-calorie diet    [] Maintenance       []Other        Exercise: [x] Cardio     [] Resistance/strength training                       [x] ACSM recommendations (150 minutes/week in active weight loss)               Behavior: [x] Motivational interviewing performed    [] Referral for counseling                         [x] Discussed strategies to overcome habits/challenges for focus         [] Stress management   [x] Stimulus control [] Sleep hygiene      Reviewed:  [x] Nutrition and the importance of regular protein intake  [x] Hidden carbohydrate sources  [x] Alcohol use  [x] Tobacco use   [x] Drug use- Denies  [x] Importance of exercise and reducing sedentary time  [x] Treatment consent- Patient understands and agrees with the treatment plan   [x] Proper use of medication and side effects  [x] Labs              Key dietary points:    - Meats (preferably organic or grass fed) are great sources of protein and have no carbohydrates. - Recommend coconut, olive, avocado, or almond oils. - When buying dairy, choose regular or full fat options. - Choose vegetables that grow above ground as they are generally lower in carbohydrates and higher in fiber.  - Avoid starches such as bread, rice, potatoes, pasta and all sources of simple sugars (desserts, soda, breakfast cereals). - Choose beverages that are calorie and sugar free. No orders of the defined types were placed in this encounter. No follow-ups on file. Jenny Barfield is a 42 y.o. female being evaluated by a Virtual Visit (video visit) encounter to address concerns as mentioned above. A caregiver was present when appropriate. Due to this being a TeleHealth encounter (During WZNBZ-07 public health emergency), evaluation of the following organ systems was limited: Vitals/Constitutional/EENT/Resp/CV/GI//MS/Neuro/Skin/Heme-Lymph-Imm. Pursuant to the emergency declaration under the [facility], [address] [place] [address] authority and the [employer] and [employer] Act, this Virtual Visit was conducted with patient's (and/or legal guardian's) consent, to reduce the patient's risk of exposure to COVID-19 and provide necessary medical care.   The patient (and/or legal guardian) has also been advised to contact this office for worsening conditions or problems, and seek emergency medical treatment and/or call 911 if deemed necessary.

## 2022-12-01 ENCOUNTER — TELEPHONE (OUTPATIENT)
Dept: BARIATRICS/WEIGHT MGMT | Age: 37
End: 2022-12-01

## 2022-12-01 NOTE — TELEPHONE ENCOUNTER
Labs/EKG was ordered by Dr. Salvador Paige on 11/12/22    Patient went to [facility] on 11/15 to have completed. Labs were completed and resulted. EKG was completed but results were not saved into Epic. Margarita Bey at the [facility] (diagnostic center) She was unable to see results but took office ph# & fax. Will wait for update. Dr Salvador Paige will see patient back once results have been sent.

## 2022-12-11 PROBLEM — Z01.818 PREOPERATIVE CLEARANCE: Status: RESOLVED | Noted: 2022-11-11 | Resolved: 2022-12-11

## 2022-12-28 ENCOUNTER — TELEMEDICINE (OUTPATIENT)
Dept: BARIATRICS/WEIGHT MGMT | Age: 37
End: 2022-12-28
Payer: COMMERCIAL

## 2022-12-28 DIAGNOSIS — Z71.3 DIETARY COUNSELING AND SURVEILLANCE: ICD-10-CM

## 2022-12-28 DIAGNOSIS — E66.9 CLASS 1 OBESITY: Primary | ICD-10-CM

## 2022-12-28 PROCEDURE — 99214 OFFICE O/P EST MOD 30 MIN: CPT | Performed by: FAMILY MEDICINE

## 2022-12-28 RX ORDER — NALTREXONE HYDROCHLORIDE AND BUPROPION HYDROCHLORIDE 8; 90 MG/1; MG/1
TABLET, EXTENDED RELEASE ORAL
Qty: 120 TABLET | Refills: 0 | Status: SHIPPED
Start: 2022-12-28 | End: 2022-12-29

## 2022-12-28 ASSESSMENT — ENCOUNTER SYMPTOMS
ABDOMINAL DISTENTION: 0
NAUSEA: 0
PHOTOPHOBIA: 0
DIARRHEA: 0
BLOOD IN STOOL: 0
CHOKING: 0
CHEST TIGHTNESS: 0
EYE PAIN: 0
CONSTIPATION: 0
ABDOMINAL PAIN: 0
SHORTNESS OF BREATH: 0
WHEEZING: 0
COUGH: 0
APNEA: 0
VOMITING: 0

## 2022-12-28 NOTE — PROGRESS NOTES
Patient: Hector Contreras                      Encounter Date: 12/28/2022    YOB: 1985                Age: 42 y.o. Chief Complaint   Patient presents with    Weight Management     F/u MWM           Patient identification was verified at the start of the visit. Patient-Reported Vitals 11/12/2022   Patient-Reported Weight 212   Patient-Reported Height 5'7\"   Patient-Reported Temperature -         BP Readings from Last 1 Encounters:   11/11/22 124/83       BMI Readings from Last 1 Encounters:   11/11/22 34.38 kg/m²       Pulse Readings from Last 1 Encounters:   08/09/22 (!) 102          Wt Readings from Last 3 Encounters:   11/11/22 213 lb (96.6 kg)   08/09/22 224 lb (101.6 kg)   07/30/22 224 lb 9.6 oz (101.9 kg)             HPI: 42 y.o. female with a long-standing history of obesity presents today for virtual video follow-up. Her weight is stable from her last visit. Interested in aom to help with appetite regulation/cravings.          Allergies   Allergen Reactions    Fentanyl Nausea And Vomiting     She reports it made her extremely sick and did not help with pain, made her feel like she wanted to die    Topamax [Topiramate] Other (See Comments)     Kidney stones    Cymbalta [Duloxetine Hcl] Other (See Comments)     moodiness         Current Outpatient Medications:     naltrexone-buPROPion (CONTRAVE) 8-90 MG per extended release tablet, Take 2 tablets by mouth 2 times daily Week1: 1tab in morning x7days, week2:1 tab morning 1 tab evening x7days, week3: 2tab morning, 1 tab evening x7days, week4:2 tabs morning 2tabs evening, Disp: 120 tablet, Rfl: 0    SUMAtriptan Succinate (IMITREX PO), Take by mouth, Disp: , Rfl:     ondansetron (ZOFRAN ODT) 4 MG disintegrating tablet, Take 1 tablet by mouth every 8 hours as needed for Nausea or Vomiting, Disp: 30 tablet, Rfl: 1    tamsulosin (FLOMAX) 0.4 MG capsule, Take 1 capsule by mouth in the morning., Disp: 30 capsule, Rfl: 0    Rimegepant Sulfate (NURTEC) 75 MG TBDP, Take 75 mg by mouth as needed (migraine), Disp: , Rfl:     eletriptan (RELPAX) 40 MG tablet, TAKE ONE-HALF TO ONE TABLET BY MOUTH ONCE AS NEEDED FOR MIGRAINE MAY REPEAT IN 2 HOURS IF NEEDED, Disp: 6 tablet, Rfl: 5    SUMAtriptan (IMITREX) 100 MG tablet, Take 1 tablet by mouth once as needed for Migraine (may repeat 2nd dose within 2 hours. max 2 tabs in 24 hours.), Disp: 9 tablet, Rfl: 5    Patient Active Problem List   Diagnosis    Migraines    Interstitial cystitis    Allergic sinusitis    Kidney stones    PCOS (polycystic ovarian syndrome)    Depression    Anxiety    Ectopic pregnancy    Obesity (BMI 30.0-34. 9)    Hydroureteronephrosis    Ureterovesical junction (UVJ) obstruction    Acquired ureterovesical junction (UVJ) obstruction    Obstructive uropathy    History of migraine    SIRS (systemic inflammatory response syndrome) (HCC)    Prolonged emergence from general anesthesia    Stomach ulcer    Urinary incontinence    Endometriosis    IBS (irritable bowel syndrome)    Infertility, female    Morbid obesity due to excess calories ([address]       Review of Systems   Constitutional:  Negative for fatigue. Eyes:  Negative for photophobia, pain and visual disturbance. Respiratory:  Negative for apnea, cough, choking, chest tightness, shortness of breath and wheezing. Cardiovascular:  Negative for chest pain, palpitations and leg swelling. Gastrointestinal:  Negative for abdominal distention, abdominal pain, blood in stool, constipation, diarrhea, nausea and vomiting. Endocrine: Negative for cold intolerance and heat intolerance. Musculoskeletal:  Negative for arthralgias and myalgias. Skin:  Negative for rash. Neurological:  Negative for dizziness, tremors, syncope, weakness, numbness and headaches. Psychiatric/Behavioral:  Negative for agitation, confusion, decreased concentration, dysphoric mood, hallucinations, sleep disturbance and suicidal ideas.  The patient is not nervous/anxious and is not hyperactive. Physical Exam  Constitutional:       Appearance: She is well-developed. HENT:      Head: Normocephalic. Eyes:      Conjunctiva/sclera: Conjunctivae normal.   Abdominal:      General: Abdomen is protuberant. Musculoskeletal:         General: No swelling. Neurological:      Mental Status: She is alert and oriented to person, place, and time. Psychiatric:         Mood and Affect: Mood normal.         Behavior: Behavior normal.         Thought Content: Thought content normal.         Judgment: Judgment normal.       Hospital Outpatient Visit on 11/15/2022   Component Date Value Ref Range Status    Vit D, 25-Hydroxy 11/15/2022 26.0 (A)  >=30 ng/mL Final    Comment: <=20 ng/mL. ........... Sergio Ogren Deficient  21-29 ng/mL. ......... Sergio Ogren Insufficient  >=30 ng/mL. ........ Sergio Ogren Sufficient      Sodium 11/15/2022 139  136 - 145 mmol/L Final    Potassium 11/15/2022 4.0  3.5 - 5.1 mmol/L Final    Chloride 11/15/2022 100  99 - 110 mmol/L Final    CO2 11/15/2022 25  21 - 32 mmol/L Final    Anion Gap 11/15/2022 14  3 - 16 Final    Glucose 11/15/2022 91  70 - 99 mg/dL Final    BUN 11/15/2022 13  7 - 20 mg/dL Final    Creatinine 11/15/2022 0.6  0.6 - 1.1 mg/dL Final    Est, Glom Filt Rate 11/15/2022 >60  >60 Final    Comment: Pediatric calculator link  [email or web address]  Effective Oct 3, 2022  These results are not intended for use in patients  <25years of age. eGFR results are calculated without  a race factor using the 2021 CKD-EPI equation. Careful  clinical correlation is recommended, particularly when  comparing to results calculated using previous equations. The CKD-EPI equation is less accurate in patients with  extremes of muscle mass, extra-renal metabolism of  creatinine, excessive creatinine ingestion, or following  therapy that affects renal tubular secretion.       Calcium 11/15/2022 9.1  8.3 - 10.6 mg/dL Final    Total Protein 11/15/2022 7.2  6.4 - 8.2 g/dL Final    Albumin 11/15/2022 4.9  3.4 - 5.0 g/dL Final    Albumin/Globulin Ratio 11/15/2022 2.1  1.1 - 2.2 Final    Total Bilirubin 11/15/2022 0.6  0.0 - 1.0 mg/dL Final    Alkaline Phosphatase 11/15/2022 78  40 - 129 U/L Final    ALT 11/15/2022 25  10 - 40 U/L Final    AST 11/15/2022 22  15 - 37 U/L Final    Hemoglobin A1C 11/15/2022 5.3  See comment % Final    Comment: Comment:  Diagnosis of Diabetes: > or = 6.5%  Increased risk of diabetes (Prediabetes): 5.7-6.4%  Glycemic Control: Nonpregnant Adults: <7.0%                    Pregnant: <6.0%        eAG 11/15/2022 105.4  mg/dL Final    Vitamin B-12 11/15/2022 567  211 - 911 pg/mL Final    Folate 11/15/2022 >20.00  4.78 - 24.20 ng/mL Final    Comment: Effective 11-15-16 10:00am EST  Please note reference ranges have  changed for Folate.      TSH 11/15/2022 0.84  0.27 - 4.20 uIU/mL Final         Assessment and Plan:  1. Class 1 obesity  Discussed risks, benefits and alternatives of Contrave.  Patient meets BMI criteria. she denies MAOI use within the past 14 days, is not on any opioids, and does not have a seizure disorder.    Start Contrave 8/90 mg. Use as directed. F/u in 2 weeks before titrating up to 3 pills a day.     Explained to patient that I will monitor her weight loss every 12 weeks. Goal is to lose at least 5% of her body weight.    Counseled patient on proper use and potential side effects including nausea/vomiting, constipation, headache, dizziness, insomnia, xerostomia, diarrhea, anxiety, increased blood pressure, flushing, fatigue, tremors, irritability, rash and/or palpitations.    she understands that it is her responsibility to make sure that she does not run out of medications and to follow up to her appointments every 2-4 weeks as recommended.     Heavily counseled on the importance of therapeutic lifestyle changes through diet and exercise.    Patient is responsible for keeping his monthly appointments. Failure to comply with her monthly visits  will result in discontinuing Contrave. Patient advised to report any side effects. 2. Dietary counseling and surveillance  Low carb/lester meal plan as prescribed. Avoid skipping meals. Avoid evening/nighttime snacking. Use distraction techniques to avoid boredom/stress eating. Plan/prep meals ahead of time. Avoid soda/juice/sugary drinks. Be mindful of portion sizes. Nutrition Plan: [] LCHF/Ketogenic   [x] Modified low-calorie diet (low carb/low-lester)               [] Low-calorie diet    [] Maintenance       []Other        Exercise: [x] Cardio     [] Resistance/strength training                       [x] ACSM recommendations (150 minutes/week in active weight loss)               Behavior: [x] Motivational interviewing performed    [] Referral for counseling                         [x] Discussed strategies to overcome habits/challenges for focus         [] Stress management   [x] Stimulus control         [] Sleep hygiene      Reviewed:  [x] Nutrition and the importance of regular protein intake  [x] Hidden carbohydrate sources  [x] Alcohol use  [x] Tobacco use   [x] Drug use- Denies  [x] Importance of exercise and reducing sedentary time  [x] Treatment consent- Patient understands and agrees with the treatment plan   [x] Proper use of medication and side effects      Controlled Substance Monitoring:    No flowsheet data found. Patient denies any history of cardiovascular disease (e.g., CAD, stroke, arrhythmias, CHF, uncontrolled HTN), seizure disorder, MAOI use within the last 2 weeks, hyperthyroidism, glaucoma, agitated states, history of drug abuse, pregnancy, nursing, known hypersensitivity to the prescribing meds, history of pancreatitis, or personal or family history of thyroid medullary cancer.       Treatment start date: 12/29/22  12 weeks from start therapeutic dose: 4/29/23  Starting weight: 213 pounds    Goal: At least 5% (10.5 pounds)         Key dietary points:    - Meats (preferably organic or grass fed) are great sources of protein and have no carbohydrates. - Recommend coconut, olive, avocado, or almond oils. - When buying dairy, choose regular or full fat options. - Choose vegetables that grow above ground as they are generally lower in carbohydrates and higher in fiber.  - Avoid starches such as bread, rice, potatoes, pasta and all sources of simple sugars (desserts, soda, breakfast cereals). - Choose beverages that are calorie and sugar free. Reminder regarding weight loss medications: You must be seen in office every 2-4 weeks to haveyour prescriptions refilled. If you are off of your medication for longer than 7 days, you will not be able to restart the medication for at least 6 months. Always call our office to report any side effects. Females, it is your responsibility to obtain negative pregnancy tests each month. No orders of the defined types were placed in this encounter. No follow-ups on file. Sushila Sanderson is a 42 y.o. female being evaluated by a Virtual Visit (video visit) encounter to address concerns as mentioned above. A caregiver was present when appropriate. Due to this being a TeleHealth encounter (During ZXXOC-81 public health emergency), evaluation of the following organ systems was limited: Vitals/Constitutional/EENT/Resp/CV/GI//MS/Neuro/Skin/Heme-Lymph-Imm. Pursuant to the emergency declaration under the [address] [place] [address], [address] [place] [address] and the [employer] and [employer] Act, this Virtual Visit was conducted with patient's (and/or legal guardian's) consent, to reduce the patient's risk of exposure to COVID-19 and provide necessary medical care. The patient (and/or legal guardian) has also been advised to contact this office for worsening conditions or problems, and seek emergency medical treatment and/or call 911 if deemed necessary.

## 2023-01-24 RX ORDER — NALTREXONE HYDROCHLORIDE AND BUPROPION HYDROCHLORIDE 8; 90 MG/1; MG/1
TABLET, EXTENDED RELEASE ORAL
Qty: 120 TABLET | Refills: 0 | OUTPATIENT
Start: 2023-01-24

## 2023-01-26 ENCOUNTER — TELEMEDICINE (OUTPATIENT)
Dept: BARIATRICS/WEIGHT MGMT | Age: 38
End: 2023-01-26
Payer: COMMERCIAL

## 2023-01-26 DIAGNOSIS — E66.9 CLASS 1 OBESITY: Primary | ICD-10-CM

## 2023-01-26 DIAGNOSIS — Z71.3 DIETARY COUNSELING AND SURVEILLANCE: ICD-10-CM

## 2023-01-26 PROCEDURE — 99214 OFFICE O/P EST MOD 30 MIN: CPT | Performed by: FAMILY MEDICINE

## 2023-01-26 RX ORDER — NALTREXONE HYDROCHLORIDE AND BUPROPION HYDROCHLORIDE 8; 90 MG/1; MG/1
2 TABLET, EXTENDED RELEASE ORAL 2 TIMES DAILY
Qty: 120 TABLET | Refills: 0 | Status: SHIPPED | OUTPATIENT
Start: 2023-01-26

## 2023-01-26 ASSESSMENT — ENCOUNTER SYMPTOMS
ABDOMINAL PAIN: 0
DIARRHEA: 0
CHOKING: 0
BLOOD IN STOOL: 0
CHEST TIGHTNESS: 0
APNEA: 0
ABDOMINAL DISTENTION: 0
VOMITING: 0
WHEEZING: 0
COUGH: 0
SHORTNESS OF BREATH: 0
CONSTIPATION: 0
EYE PAIN: 0
PHOTOPHOBIA: 0
NAUSEA: 0

## 2023-01-26 NOTE — PROGRESS NOTES
Patient: Leatha Jerome                      Encounter Date: 1/26/2023    YOB: 1985                Age: 42 y.o. Chief Complaint   Patient presents with    Weight Management     F/u MWM           Patient identification was verified at the start of the visit. Patient-Reported Vitals 1/26/2023   Patient-Reported Weight 211   Patient-Reported Height 5'8\"   Patient-Reported Temperature -         BP Readings from Last 1 Encounters:   11/11/22 124/83       BMI Readings from Last 1 Encounters:   11/11/22 34.38 kg/m²       Pulse Readings from Last 1 Encounters:   08/09/22 (!) 102          Wt Readings from Last 3 Encounters:   11/11/22 213 lb (96.6 kg)   08/09/22 224 lb (101.6 kg)   07/30/22 224 lb 9.6 oz (101.9 kg)        HPI: 42 y.o. female with a long-standing history of obesity presents today for virtual video follow-up. she has lost 11  pounds since her last visit. Current treatment includes Contrave and low carb/lester diet. She is on 3 pills/day. Tolerating it well. Food recall reviewed with the patient. Making better dietary choices. Motivated to continue losing weight. Medication(s): Appetite well controlled? [x]Yes      []No    Focus:     [x]Good     []Fair     []Poor    Side effects? No        Any recent change in medication(s)?  No    Exercise: [x]Cardio     [x]Resistance/strength training    Gym 3-4x/week     Allergies   Allergen Reactions    Fentanyl Nausea And Vomiting     She reports it made her extremely sick and did not help with pain, made her feel like she wanted to die    Topamax [Topiramate] Other (See Comments)     Kidney stones    Cymbalta [Duloxetine Hcl] Other (See Comments)     moodiness         Current Outpatient Medications:     naltrexone-buPROPion (CONTRAVE) 8-90 MG per extended release tablet, Take 2 tablets by mouth 2 times daily, Disp: 120 tablet, Rfl: 0    SUMAtriptan Succinate (IMITREX PO), Take by mouth, Disp: , Rfl:     ondansetron (ZOFRAN ODT) 4 MG disintegrating tablet, Take 1 tablet by mouth every 8 hours as needed for Nausea or Vomiting, Disp: 30 tablet, Rfl: 1    tamsulosin (FLOMAX) 0.4 MG capsule, Take 1 capsule by mouth in the morning., Disp: 30 capsule, Rfl: 0    Rimegepant Sulfate (NURTEC) 75 MG TBDP, Take 75 mg by mouth as needed (migraine), Disp: , Rfl:     eletriptan (RELPAX) 40 MG tablet, TAKE ONE-HALF TO ONE TABLET BY MOUTH ONCE AS NEEDED FOR MIGRAINE MAY REPEAT IN 2 HOURS IF NEEDED, Disp: 6 tablet, Rfl: 5    SUMAtriptan (IMITREX) 100 MG tablet, Take 1 tablet by mouth once as needed for Migraine (may repeat 2nd dose within 2 hours. max 2 tabs in 24 hours.), Disp: 9 tablet, Rfl: 5    Patient Active Problem List   Diagnosis    Migraines    Interstitial cystitis    Allergic sinusitis    Kidney stones    PCOS (polycystic ovarian syndrome)    Depression    Anxiety    Ectopic pregnancy    Obesity (BMI 30.0-34. 9)    Hydroureteronephrosis    Ureterovesical junction (UVJ) obstruction    Acquired ureterovesical junction (UVJ) obstruction    Obstructive uropathy    History of migraine    SIRS (systemic inflammatory response syndrome) (HCC)    Prolonged emergence from general anesthesia    Stomach ulcer    Urinary incontinence    Endometriosis    IBS (irritable bowel syndrome)    Infertility, female    Morbid obesity due to excess calories ([address]       Review of Systems   Constitutional:  Negative for fatigue. Eyes:  Negative for photophobia, pain and visual disturbance. Respiratory:  Negative for apnea, cough, choking, chest tightness, shortness of breath and wheezing. Cardiovascular:  Negative for chest pain, palpitations and leg swelling. Gastrointestinal:  Negative for abdominal distention, abdominal pain, blood in stool, constipation, diarrhea, nausea and vomiting. Endocrine: Negative for cold intolerance and heat intolerance. Musculoskeletal:  Negative for arthralgias and myalgias. Skin:  Negative for rash.    Neurological:  Negative for dizziness, tremors, syncope, weakness, numbness and headaches. Psychiatric/Behavioral:  Negative for agitation, confusion, decreased concentration, dysphoric mood, hallucinations, sleep disturbance and suicidal ideas. The patient is not nervous/anxious and is not hyperactive. Physical Exam  Constitutional:       Appearance: She is well-developed. HENT:      Head: Normocephalic. Eyes:      Conjunctiva/sclera: Conjunctivae normal.   Abdominal:      General: Abdomen is protuberant. Musculoskeletal:         General: No swelling. Neurological:      Mental Status: She is alert and oriented to person, place, and time. Psychiatric:         Mood and Affect: Mood normal.         Behavior: Behavior normal.         Thought Content: Thought content normal.         Judgment: Judgment normal.       Hospital Outpatient Visit on 11/15/2022   Component Date Value Ref Range Status    Vit D, 25-Hydroxy 11/15/2022 26.0 (A)  >=30 ng/mL Final    Comment: <=20 ng/mL. ........... [facility] Deficient  21-29 ng/mL. ......... [facility] Insufficient  >=30 ng/mL. ........ [facility] Sufficient      Sodium 11/15/2022 139  136 - 145 mmol/L Final    Potassium 11/15/2022 4.0  3.5 - 5.1 mmol/L Final    Chloride 11/15/2022 100  99 - 110 mmol/L Final    CO2 11/15/2022 25  21 - 32 mmol/L Final    Anion Gap 11/15/2022 14  3 - 16 Final    Glucose 11/15/2022 91  70 - 99 mg/dL Final    BUN 11/15/2022 13  7 - 20 mg/dL Final    Creatinine 11/15/2022 0.6  0.6 - 1.1 mg/dL Final    Est, Glom Filt Rate 11/15/2022 >60  >60 Final    Comment: Pediatric calculator link  [email or web address] org/professionals/kdoqi/gfr_calculatorped  Effective Oct 3, 2022  These results are not intended for use in patients  <25years of age. eGFR results are calculated without  a race factor using the 2021 CKD-EPI equation. Careful  clinical correlation is recommended, particularly when  comparing to results calculated using previous equations.   The CKD-EPI equation is less accurate in patients with  extremes of muscle mass, extra-renal metabolism of  creatinine, excessive creatinine ingestion, or following  therapy that affects renal tubular secretion. Calcium 11/15/2022 9.1  8.3 - 10.6 mg/dL Final    Total Protein 11/15/2022 7.2  6.4 - 8.2 g/dL Final    Albumin 11/15/2022 4.9  3.4 - 5.0 g/dL Final    Albumin/Globulin Ratio 11/15/2022 2.1  1.1 - 2.2 Final    Total Bilirubin 11/15/2022 0.6  0.0 - 1.0 mg/dL Final    Alkaline Phosphatase 11/15/2022 78  40 - 129 U/L Final    ALT 11/15/2022 25  10 - 40 U/L Final    AST 11/15/2022 22  15 - 37 U/L Final    Hemoglobin A1C 11/15/2022 5.3  See comment % Final    Comment: Comment:  Diagnosis of Diabetes: > or = 6.5%  Increased risk of diabetes (Prediabetes): 5.7-6.4%  Glycemic Control: Nonpregnant Adults: <7.0%                    Pregnant: <6.0%        eAG 11/15/2022 105.4  mg/dL Final    Vitamin B-12 11/15/2022 567  211 - 911 pg/mL Final    Folate 11/15/2022 >20.00  4.78 - 24.20 ng/mL Final    Comment: Effective 11-15-16 10:00am EST  Please note reference ranges have  changed for Folate. TSH 11/15/2022 0.84  0.27 - 4.20 uIU/mL Final         Assessment and Plan:  1. Class 1 obesity  Improving, not at goal.   Continue Contrave- increase dose as prescribed (refill provided). Low carb/justin meal plan. F/u 4 weeks. 2. Dietary counseling and surveillance  1200-Justin/low carb meal plan.        Nutrition Plan: [] LCHF/Ketogenic   [x] Modified low-calorie diet (low carb/low-justin)               [] Low-calorie diet    [] Maintenance       []Other        Exercise: [x] Cardio     [x] Resistance/strength training                       [x] ACSM recommendations (150 minutes/week in active weight loss)               Behavior: [x] Motivational interviewing performed    [] Referral for counseling                         [x] Discussed strategies to overcome habits/challenges for focus         [] Stress management   [x] Stimulus control         [] Sleep hygiene      Reviewed:  [x] Nutrition and the importance of regular protein intake  [x] Hidden carbohydrate sources  [x] Alcohol use  [x] Tobacco use   [x] Drug use- Denies  [x] Importance of exercise and reducing sedentary time  [x] Treatment consent- Patient understands and agrees with the treatment plan   [x] Proper use of medication and side effects      Controlled Substance Monitoring:    No flowsheet data found. Treatment start date: 1/8/23  12 weeks from start therapeutic dose: 5/8/23  Starting weight: 213 pounds  (office scale), 220 pounds  Goal: At least 5% (10.5 pounds)   Total weight loss: 9 pounds (home scale)         Key dietary points:    - Meats (preferably organic or grass fed) are great sources of protein and have no carbohydrates. - Recommend coconut, olive, avocado, or almond oils. - When buying dairy, choose regular or full fat options. - Choose vegetables that grow above ground as they are generally lower in carbohydrates and higher in fiber.  - Avoid starches such as bread, rice, potatoes, pasta and all sources of simple sugars (desserts, soda, breakfast cereals). - Choose beverages that are calorie and sugar free. Reminder regarding weight loss medications: You must be seen in office every 2-4 weeks to haveyour prescriptions refilled. If you are off of your medication for longer than 7 days, you will not be able to restart the medication for at least 6 months. Always call our office to report any side effects. Females, it is your responsibility to obtain negative pregnancy tests each month. No orders of the defined types were placed in this encounter. No follow-ups on file. Colby Berman is a 42 y.o. female being evaluated by a Virtual Visit (video visit) encounter to address concerns as mentioned above. A caregiver was present when appropriate.  Due to this being a TeleHealth encounter (During EMQAE-51 public health emergency), evaluation of the following organ systems was limited: Vitals/Constitutional/EENT/Resp/CV/GI//MS/Neuro/Skin/Heme-Lymph-Imm. Pursuant to the emergency declaration under the [address] [place] [address] authority and the [employer] Act, this Virtual Visit was conducted with patient's (and/or legal guardian's) consent, to reduce the patient's risk of exposure to COVID-19 and provide necessary medical care. The patient (and/or legal guardian) has also been advised to contact this office for worsening conditions or problems, and seek emergency medical treatment and/or call 911 if deemed necessary.

## 2023-01-27 ENCOUNTER — OFFICE VISIT (OUTPATIENT)
Dept: FAMILY MEDICINE CLINIC | Age: 38
End: 2023-01-27
Payer: COMMERCIAL

## 2023-01-27 VITALS
BODY MASS INDEX: 34.38 KG/M2 | WEIGHT: 213 LBS | SYSTOLIC BLOOD PRESSURE: 132 MMHG | OXYGEN SATURATION: 98 % | HEART RATE: 78 BPM | DIASTOLIC BLOOD PRESSURE: 70 MMHG

## 2023-01-27 DIAGNOSIS — J02.9 SORE THROAT: Primary | ICD-10-CM

## 2023-01-27 LAB — S PYO AG THROAT QL: NORMAL

## 2023-01-27 PROCEDURE — 87880 STREP A ASSAY W/OPTIC: CPT | Performed by: FAMILY MEDICINE

## 2023-01-27 PROCEDURE — 99213 OFFICE O/P EST LOW 20 MIN: CPT | Performed by: FAMILY MEDICINE

## 2023-01-27 RX ORDER — KETOROLAC TROMETHAMINE 10 MG/1
1 TABLET, FILM COATED ORAL
COMMUNITY

## 2023-01-27 ASSESSMENT — ANXIETY QUESTIONNAIRES
7. FEELING AFRAID AS IF SOMETHING AWFUL MIGHT HAPPEN: 0
3. WORRYING TOO MUCH ABOUT DIFFERENT THINGS: 3
6. BECOMING EASILY ANNOYED OR IRRITABLE: 3
2. NOT BEING ABLE TO STOP OR CONTROL WORRYING: 1
GAD7 TOTAL SCORE: 10
1. FEELING NERVOUS, ANXIOUS, OR ON EDGE: 3
4. TROUBLE RELAXING: 0
IF YOU CHECKED OFF ANY PROBLEMS ON THIS QUESTIONNAIRE, HOW DIFFICULT HAVE THESE PROBLEMS MADE IT FOR YOU TO DO YOUR WORK, TAKE CARE OF THINGS AT HOME, OR GET ALONG WITH OTHER PEOPLE: SOMEWHAT DIFFICULT
5. BEING SO RESTLESS THAT IT IS HARD TO SIT STILL: 0

## 2023-01-27 ASSESSMENT — PATIENT HEALTH QUESTIONNAIRE - PHQ9
4. FEELING TIRED OR HAVING LITTLE ENERGY: 2
5. POOR APPETITE OR OVEREATING: 2
SUM OF ALL RESPONSES TO PHQ QUESTIONS 1-9: 7
7. TROUBLE CONCENTRATING ON THINGS, SUCH AS READING THE NEWSPAPER OR WATCHING TELEVISION: 2
6. FEELING BAD ABOUT YOURSELF - OR THAT YOU ARE A FAILURE OR HAVE LET YOURSELF OR YOUR FAMILY DOWN: 0
10. IF YOU CHECKED OFF ANY PROBLEMS, HOW DIFFICULT HAVE THESE PROBLEMS MADE IT FOR YOU TO DO YOUR WORK, TAKE CARE OF THINGS AT HOME, OR GET ALONG WITH OTHER PEOPLE: 0
8. MOVING OR SPEAKING SO SLOWLY THAT OTHER PEOPLE COULD HAVE NOTICED. OR THE OPPOSITE, BEING SO FIGETY OR RESTLESS THAT YOU HAVE BEEN MOVING AROUND A LOT MORE THAN USUAL: 0
SUM OF ALL RESPONSES TO PHQ QUESTIONS 1-9: 7
2. FEELING DOWN, DEPRESSED OR HOPELESS: 0
SUM OF ALL RESPONSES TO PHQ QUESTIONS 1-9: 7
3. TROUBLE FALLING OR STAYING ASLEEP: 1
9. THOUGHTS THAT YOU WOULD BE BETTER OFF DEAD, OR OF HURTING YOURSELF: 0
SUM OF ALL RESPONSES TO PHQ QUESTIONS 1-9: 7

## 2023-01-27 ASSESSMENT — ENCOUNTER SYMPTOMS
SORE THROAT: 1
COUGH: 0

## 2023-01-27 NOTE — PROGRESS NOTES
Colby Berman is a 42 y.o. female    Chief Complaint   Patient presents with    Pharyngitis     Past couple days, pt states soreness alternated; left side to right side. Today is barely is sore        HPI:    Pharyngitis  This is a new problem. The current episode started in the past 7 days. The problem occurs daily. The problem has been gradually improving. Associated symptoms include a sore throat. Pertinent negatives include no coughing or fever. Nothing aggravates the symptoms. She has tried acetaminophen for the symptoms. The treatment provided mild relief. ROS:    Review of Systems   Constitutional:  Negative for fever. HENT:  Positive for sore throat. Respiratory:  Negative for cough. /70   Pulse 78   Wt 213 lb (96.6 kg)   LMP 03/14/2019   SpO2 98%   BMI 34.38 kg/m²     Physical Exam:    Physical Exam  Constitutional:       General: She is not in acute distress. Appearance: Normal appearance. She is not ill-appearing or toxic-appearing. HENT:      Head: Normocephalic. Right Ear: Tympanic membrane normal.      Left Ear: Tympanic membrane normal.      Nose: Congestion present. No rhinorrhea. Mouth/Throat:      Mouth: Mucous membranes are moist.      Pharynx: Oropharynx is clear. No oropharyngeal exudate or posterior oropharyngeal erythema. Musculoskeletal:      Cervical back: Normal range of motion and neck supple. No rigidity. Lymphadenopathy:      Cervical: No cervical adenopathy. Neurological:      Mental Status: She is alert. Psychiatric:         Mood and Affect: Mood normal.         Behavior: Behavior normal.         Thought Content:  Thought content normal.       Current Outpatient Medications   Medication Sig Dispense Refill    naltrexone-buPROPion (CONTRAVE) 8-90 MG per extended release tablet Take 2 tablets by mouth 2 times daily 120 tablet 0    SUMAtriptan Succinate (IMITREX PO) Take by mouth      ondansetron (ZOFRAN ODT) 4 MG disintegrating tablet Take 1 tablet by mouth every 8 hours as needed for Nausea or Vomiting 30 tablet 1    tamsulosin (FLOMAX) 0.4 MG capsule Take 1 capsule by mouth in the morning. 30 capsule 0    Rimegepant Sulfate (NURTEC) 75 MG TBDP Take 75 mg by mouth as needed (migraine)      eletriptan (RELPAX) 40 MG tablet TAKE ONE-HALF TO ONE TABLET BY MOUTH ONCE AS NEEDED FOR MIGRAINE MAY REPEAT IN 2 HOURS IF NEEDED 6 tablet 5    vitamin D (CHOLECALCIFEROL) 25 MCG (1000 UT) TABS tablet 1 tablet      ketorolac (TORADOL) 10 MG tablet 1 tablet      SUMAtriptan (IMITREX) 100 MG tablet Take 1 tablet by mouth once as needed for Migraine (may repeat 2nd dose within 2 hours. max 2 tabs in 24 hours.) 9 tablet 5     No current facility-administered medications for this visit.       Assessment:    1. Sore throat        Plan:    1. Sore throat  Likely viral and/or allergic.  Strep was negative.  I do not think it is a bacterial cause of sore throat/sinusitis.  I would recommend continuing Flonase and adding on Claritin with or without Sudafed.  - POCT rapid strep A      Patient to return to clinic if symptoms worsen or fail to improve.

## 2023-01-31 DIAGNOSIS — J02.9 SORE THROAT: Primary | ICD-10-CM

## 2023-01-31 RX ORDER — AZITHROMYCIN 250 MG/1
TABLET, FILM COATED ORAL
Qty: 1 PACKET | Refills: 0 | Status: SHIPPED | OUTPATIENT
Start: 2023-01-31

## 2023-03-20 ENCOUNTER — TELEPHONE (OUTPATIENT)
Dept: BARIATRICS/WEIGHT MGMT | Age: 38
End: 2023-03-20

## 2023-03-20 VITALS — BODY MASS INDEX: 29.4 KG/M2 | WEIGHT: 194 LBS | HEIGHT: 68 IN

## 2023-03-20 RX ORDER — NALTREXONE HYDROCHLORIDE AND BUPROPION HYDROCHLORIDE 8; 90 MG/1; MG/1
TABLET, EXTENDED RELEASE ORAL
Qty: 120 TABLET | Refills: 0 | OUTPATIENT
Start: 2023-03-20

## 2023-03-20 NOTE — TELEPHONE ENCOUNTER
Spoke to pt, did let pt know that provider will be in office on Wednesday at her appt. To get a refill.  Pt understood

## 2023-03-20 NOTE — TELEPHONE ENCOUNTER
Patient called stating she took her last dose today of her Contrave. Called for a refill and there isn't any on her script. Patient will not have any medication. Please advise patient asap.

## 2023-03-22 ENCOUNTER — TELEMEDICINE (OUTPATIENT)
Dept: BARIATRICS/WEIGHT MGMT | Age: 38
End: 2023-03-22
Payer: COMMERCIAL

## 2023-03-22 DIAGNOSIS — Z71.3 DIETARY COUNSELING AND SURVEILLANCE: ICD-10-CM

## 2023-03-22 DIAGNOSIS — E66.9 CLASS 1 OBESITY: Primary | ICD-10-CM

## 2023-03-22 PROCEDURE — 99214 OFFICE O/P EST MOD 30 MIN: CPT | Performed by: FAMILY MEDICINE

## 2023-03-22 RX ORDER — NALTREXONE HYDROCHLORIDE AND BUPROPION HYDROCHLORIDE 8; 90 MG/1; MG/1
2 TABLET, EXTENDED RELEASE ORAL 2 TIMES DAILY
Qty: 120 TABLET | Refills: 0 | Status: SHIPPED | OUTPATIENT
Start: 2023-03-22

## 2023-03-22 ASSESSMENT — ENCOUNTER SYMPTOMS
COUGH: 0
CONSTIPATION: 0
VOMITING: 0
CHOKING: 0
APNEA: 0
PHOTOPHOBIA: 0
ABDOMINAL PAIN: 0
WHEEZING: 0
NAUSEA: 0
EYE PAIN: 0
DIARRHEA: 0
CHEST TIGHTNESS: 0
SHORTNESS OF BREATH: 0
ABDOMINAL DISTENTION: 0
BLOOD IN STOOL: 0

## 2023-03-22 NOTE — PROGRESS NOTES
Patient: Ramya Murguia                      Encounter Date: 3/22/2023    YOB: 1985                Age: 42 y.o. Chief Complaint   Patient presents with    Weight Management     F/u MWM           Patient identification was verified at the start of the visit. Patient-Reported Vitals 3/20/2023   Patient-Reported Weight 196   Patient-Reported Height 5' 8\"   Patient-Reported Temperature -         BP Readings from Last 1 Encounters:   01/27/23 132/70       BMI Readings from Last 1 Encounters:   03/20/23 29.50 kg/m²       Pulse Readings from Last 1 Encounters:   01/27/23 78          Wt Readings from Last 3 Encounters:   03/20/23 194 lb (88 kg)   01/27/23 213 lb (96.6 kg)   11/11/22 213 lb (96.6 kg)        HPI: 42 y.o. female with a long-standing history of obesity presents today for virtual video follow-up. She has lost 15 pounds since her last visit in January. Current treatment includes Contrave and low carb/lester diet. She is on 4 pills/day. Tolerating it well. Food recall reviewed with the patient. Making better dietary choices. Motivated to continue losing weight. Ran out of Contrave 2 days ago. Missed last appointment (unable to connect to virtual platform). Medication(s): Appetite well controlled? [x]Yes      []No                          Focus:     [x]Good     []Fair     []Poor                          Side effects? No        Any recent change in medication(s)?  No     Exercise: [x]Cardio     [x]Resistance/strength training    Gym 3-4x/week        Allergies   Allergen Reactions    Fentanyl Nausea And Vomiting     She reports it made her extremely sick and did not help with pain, made her feel like she wanted to die    Topamax [Topiramate] Other (See Comments)     Kidney stones    Cymbalta [Duloxetine Hcl] Other (See Comments)     moodiness         Current Outpatient Medications:     naltrexone-buPROPion (CONTRAVE) 8-90 MG per extended release tablet, Take 2 tablets by mouth 2 times

## 2023-04-24 ENCOUNTER — TELEMEDICINE (OUTPATIENT)
Dept: BARIATRICS/WEIGHT MGMT | Age: 38
End: 2023-04-24
Payer: COMMERCIAL

## 2023-04-24 DIAGNOSIS — Z71.3 DIETARY COUNSELING AND SURVEILLANCE: ICD-10-CM

## 2023-04-24 DIAGNOSIS — E66.9 CLASS 1 OBESITY: Primary | ICD-10-CM

## 2023-04-24 PROCEDURE — 99214 OFFICE O/P EST MOD 30 MIN: CPT | Performed by: FAMILY MEDICINE

## 2023-04-24 RX ORDER — NALTREXONE HYDROCHLORIDE AND BUPROPION HYDROCHLORIDE 8; 90 MG/1; MG/1
2 TABLET, EXTENDED RELEASE ORAL 2 TIMES DAILY
Qty: 120 TABLET | Refills: 0 | Status: ON HOLD | OUTPATIENT
Start: 2023-04-24 | End: 2023-05-16 | Stop reason: HOSPADM

## 2023-04-24 ASSESSMENT — ENCOUNTER SYMPTOMS
ABDOMINAL DISTENTION: 0
NAUSEA: 0
APNEA: 0
ABDOMINAL PAIN: 0
SHORTNESS OF BREATH: 0
DIARRHEA: 0
EYE PAIN: 0
PHOTOPHOBIA: 0
CHOKING: 0
WHEEZING: 0
VOMITING: 0
CONSTIPATION: 0
COUGH: 0
BLOOD IN STOOL: 0
CHEST TIGHTNESS: 0

## 2023-05-10 VITALS — HEIGHT: 68 IN | BODY MASS INDEX: 29.7 KG/M2 | WEIGHT: 196 LBS

## 2023-05-12 ENCOUNTER — APPOINTMENT (OUTPATIENT)
Dept: GENERAL RADIOLOGY | Age: 38
DRG: 661 | End: 2023-05-12
Payer: COMMERCIAL

## 2023-05-12 ENCOUNTER — HOSPITAL ENCOUNTER (INPATIENT)
Age: 38
LOS: 1 days | Discharge: HOME | DRG: 661 | End: 2023-05-16
Attending: EMERGENCY MEDICINE | Admitting: INTERNAL MEDICINE
Payer: COMMERCIAL

## 2023-05-12 DIAGNOSIS — N20.0 KIDNEY STONE ON LEFT SIDE: ICD-10-CM

## 2023-05-12 DIAGNOSIS — R52 INTRACTABLE PAIN: ICD-10-CM

## 2023-05-12 DIAGNOSIS — N20.1 CALCULUS OF DISTAL LEFT URETER: ICD-10-CM

## 2023-05-12 DIAGNOSIS — N20.1 URETEROLITHIASIS: Primary | ICD-10-CM

## 2023-05-12 PROBLEM — I49.3 FREQUENT PVCS: Status: ACTIVE | Noted: 2023-05-12

## 2023-05-12 LAB
ALBUMIN SERPL-MCNC: 4.1 G/DL (ref 3.4–5)
ALBUMIN/GLOB SERPL: 1.6 {RATIO} (ref 1.1–2.2)
ALP SERPL-CCNC: 67 U/L (ref 40–129)
ALT SERPL-CCNC: 45 U/L (ref 10–40)
ANION GAP SERPL CALCULATED.3IONS-SCNC: 10 MMOL/L (ref 3–16)
AST SERPL-CCNC: 27 U/L (ref 15–37)
BACTERIA URNS QL MICRO: ABNORMAL /HPF
BASOPHILS # BLD: 0 K/UL (ref 0–0.2)
BASOPHILS NFR BLD: 0.3 %
BILIRUB SERPL-MCNC: 0.3 MG/DL (ref 0–1)
BILIRUB UR QL STRIP.AUTO: NEGATIVE
BUN SERPL-MCNC: 8 MG/DL (ref 7–20)
CALCIUM SERPL-MCNC: 8.6 MG/DL (ref 8.3–10.6)
CHLORIDE SERPL-SCNC: 102 MMOL/L (ref 99–110)
CLARITY UR: CLEAR
CO2 SERPL-SCNC: 25 MMOL/L (ref 21–32)
COLOR UR: YELLOW
CREAT SERPL-MCNC: 0.6 MG/DL (ref 0.6–1.1)
DEPRECATED RDW RBC AUTO: 12.7 % (ref 12.4–15.4)
EOSINOPHIL # BLD: 0.2 K/UL (ref 0–0.6)
EOSINOPHIL NFR BLD: 2.2 %
GFR SERPLBLD CREATININE-BSD FMLA CKD-EPI: >60 ML/MIN/{1.73_M2}
GLUCOSE SERPL-MCNC: 95 MG/DL (ref 70–99)
GLUCOSE UR STRIP.AUTO-MCNC: NEGATIVE MG/DL
HCT VFR BLD AUTO: 41.7 % (ref 36–48)
HGB BLD-MCNC: 14.2 G/DL (ref 12–16)
HGB UR QL STRIP.AUTO: ABNORMAL
KETONES UR STRIP.AUTO-MCNC: NEGATIVE MG/DL
LEUKOCYTE ESTERASE UR QL STRIP.AUTO: NEGATIVE
LIPASE SERPL-CCNC: 41 U/L (ref 13–60)
LYMPHOCYTES # BLD: 1.6 K/UL (ref 1–5.1)
LYMPHOCYTES NFR BLD: 22.4 %
MCH RBC QN AUTO: 29.3 PG (ref 26–34)
MCHC RBC AUTO-ENTMCNC: 34.1 G/DL (ref 31–36)
MCV RBC AUTO: 86.1 FL (ref 80–100)
MONOCYTES # BLD: 0.8 K/UL (ref 0–1.3)
MONOCYTES NFR BLD: 11 %
MUCOUS THREADS #/AREA URNS LPF: ABNORMAL /LPF
NEUTROPHILS # BLD: 4.6 K/UL (ref 1.7–7.7)
NEUTROPHILS NFR BLD: 64.1 %
NITRITE UR QL STRIP.AUTO: NEGATIVE
PH UR STRIP.AUTO: 7 [PH] (ref 5–8)
PLATELET # BLD AUTO: 299 K/UL (ref 135–450)
PMV BLD AUTO: 7.4 FL (ref 5–10.5)
POTASSIUM SERPL-SCNC: 3.9 MMOL/L (ref 3.5–5.1)
PROT SERPL-MCNC: 6.7 G/DL (ref 6.4–8.2)
PROT UR STRIP.AUTO-MCNC: NEGATIVE MG/DL
RBC # BLD AUTO: 4.84 M/UL (ref 4–5.2)
RBC #/AREA URNS HPF: ABNORMAL /HPF (ref 0–4)
RENAL EPI CELLS #/AREA UR COMP ASSIST: ABNORMAL /HPF (ref 0–1)
SODIUM SERPL-SCNC: 137 MMOL/L (ref 136–145)
SP GR UR STRIP.AUTO: 1.01 (ref 1–1.03)
UA COMPLETE W REFLEX CULTURE PNL UR: ABNORMAL
UA DIPSTICK W REFLEX MICRO PNL UR: YES
URN SPEC COLLECT METH UR: ABNORMAL
UROBILINOGEN UR STRIP-ACNC: 0.2 E.U./DL
WBC # BLD AUTO: 7.2 K/UL (ref 4–11)
WBC #/AREA URNS HPF: ABNORMAL /HPF (ref 0–5)

## 2023-05-12 PROCEDURE — 6360000002 HC RX W HCPCS: Performed by: PHYSICIAN ASSISTANT

## 2023-05-12 PROCEDURE — 74018 RADEX ABDOMEN 1 VIEW: CPT

## 2023-05-12 PROCEDURE — G0378 HOSPITAL OBSERVATION PER HR: HCPCS

## 2023-05-12 PROCEDURE — 85025 COMPLETE CBC W/AUTO DIFF WBC: CPT

## 2023-05-12 PROCEDURE — 2500000003 HC RX 250 WO HCPCS: Performed by: INTERNAL MEDICINE

## 2023-05-12 PROCEDURE — 81001 URINALYSIS AUTO W/SCOPE: CPT

## 2023-05-12 PROCEDURE — 93005 ELECTROCARDIOGRAM TRACING: CPT | Performed by: INTERNAL MEDICINE

## 2023-05-12 PROCEDURE — 96375 TX/PRO/DX INJ NEW DRUG ADDON: CPT

## 2023-05-12 PROCEDURE — 83690 ASSAY OF LIPASE: CPT

## 2023-05-12 PROCEDURE — 96374 THER/PROPH/DIAG INJ IV PUSH: CPT

## 2023-05-12 PROCEDURE — 80053 COMPREHEN METABOLIC PANEL: CPT

## 2023-05-12 PROCEDURE — 6360000002 HC RX W HCPCS: Performed by: EMERGENCY MEDICINE

## 2023-05-12 PROCEDURE — 99285 EMERGENCY DEPT VISIT HI MDM: CPT

## 2023-05-12 PROCEDURE — 2580000003 HC RX 258: Performed by: INTERNAL MEDICINE

## 2023-05-12 PROCEDURE — 6360000002 HC RX W HCPCS: Performed by: INTERNAL MEDICINE

## 2023-05-12 PROCEDURE — 96376 TX/PRO/DX INJ SAME DRUG ADON: CPT

## 2023-05-12 PROCEDURE — 6370000000 HC RX 637 (ALT 250 FOR IP): Performed by: INTERNAL MEDICINE

## 2023-05-12 RX ORDER — ONDANSETRON 4 MG/1
4 TABLET, ORALLY DISINTEGRATING ORAL EVERY 4 HOURS PRN
Status: DISCONTINUED | OUTPATIENT
Start: 2023-05-12 | End: 2023-05-16 | Stop reason: HOSPADM

## 2023-05-12 RX ORDER — PROCHLORPERAZINE EDISYLATE 5 MG/ML
10 INJECTION INTRAMUSCULAR; INTRAVENOUS EVERY 4 HOURS PRN
Status: DISCONTINUED | OUTPATIENT
Start: 2023-05-12 | End: 2023-05-16 | Stop reason: HOSPADM

## 2023-05-12 RX ORDER — ONDANSETRON 2 MG/ML
4 INJECTION INTRAMUSCULAR; INTRAVENOUS EVERY 4 HOURS PRN
Status: DISCONTINUED | OUTPATIENT
Start: 2023-05-12 | End: 2023-05-16 | Stop reason: HOSPADM

## 2023-05-12 RX ORDER — SODIUM CHLORIDE, SODIUM LACTATE, POTASSIUM CHLORIDE, CALCIUM CHLORIDE 600; 310; 30; 20 MG/100ML; MG/100ML; MG/100ML; MG/100ML
INJECTION, SOLUTION INTRAVENOUS CONTINUOUS
Status: ACTIVE | OUTPATIENT
Start: 2023-05-13 | End: 2023-05-13

## 2023-05-12 RX ORDER — ACETAMINOPHEN 650 MG/1
650 SUPPOSITORY RECTAL EVERY 6 HOURS PRN
Status: DISCONTINUED | OUTPATIENT
Start: 2023-05-12 | End: 2023-05-16 | Stop reason: HOSPADM

## 2023-05-12 RX ORDER — SODIUM CHLORIDE 0.9 % (FLUSH) 0.9 %
10 SYRINGE (ML) INJECTION PRN
Status: DISCONTINUED | OUTPATIENT
Start: 2023-05-12 | End: 2023-05-15 | Stop reason: SDUPTHER

## 2023-05-12 RX ORDER — MORPHINE SULFATE 4 MG/ML
4 INJECTION, SOLUTION INTRAMUSCULAR; INTRAVENOUS ONCE
Status: COMPLETED | OUTPATIENT
Start: 2023-05-12 | End: 2023-05-12

## 2023-05-12 RX ORDER — MORPHINE SULFATE 4 MG/ML
4 INJECTION, SOLUTION INTRAMUSCULAR; INTRAVENOUS
Status: COMPLETED | OUTPATIENT
Start: 2023-05-12 | End: 2023-05-13

## 2023-05-12 RX ORDER — KETOROLAC TROMETHAMINE 30 MG/ML
15 INJECTION, SOLUTION INTRAMUSCULAR; INTRAVENOUS ONCE
Status: COMPLETED | OUTPATIENT
Start: 2023-05-12 | End: 2023-05-12

## 2023-05-12 RX ORDER — ACETAMINOPHEN 325 MG/1
650 TABLET ORAL EVERY 6 HOURS PRN
Status: DISCONTINUED | OUTPATIENT
Start: 2023-05-12 | End: 2023-05-16 | Stop reason: HOSPADM

## 2023-05-12 RX ORDER — IBUPROFEN 400 MG/1
800 TABLET ORAL EVERY 6 HOURS PRN
Status: DISCONTINUED | OUTPATIENT
Start: 2023-05-12 | End: 2023-05-16 | Stop reason: HOSPADM

## 2023-05-12 RX ORDER — ONDANSETRON 2 MG/ML
4 INJECTION INTRAMUSCULAR; INTRAVENOUS ONCE
Status: COMPLETED | OUTPATIENT
Start: 2023-05-12 | End: 2023-05-13

## 2023-05-12 RX ORDER — ONDANSETRON 2 MG/ML
4 INJECTION INTRAMUSCULAR; INTRAVENOUS ONCE
Status: COMPLETED | OUTPATIENT
Start: 2023-05-12 | End: 2023-05-12

## 2023-05-12 RX ORDER — CALCIUM CARBONATE 200(500)MG
1000 TABLET,CHEWABLE ORAL 3 TIMES DAILY PRN
Status: DISCONTINUED | OUTPATIENT
Start: 2023-05-12 | End: 2023-05-16 | Stop reason: HOSPADM

## 2023-05-12 RX ORDER — MAGNESIUM SULFATE 1 G/100ML
1000 INJECTION INTRAVENOUS PRN
Status: DISCONTINUED | OUTPATIENT
Start: 2023-05-12 | End: 2023-05-16 | Stop reason: HOSPADM

## 2023-05-12 RX ORDER — SODIUM CHLORIDE 9 MG/ML
INJECTION, SOLUTION INTRAVENOUS PRN
Status: DISCONTINUED | OUTPATIENT
Start: 2023-05-12 | End: 2023-05-16 | Stop reason: HOSPADM

## 2023-05-12 RX ORDER — KETOROLAC TROMETHAMINE 30 MG/ML
30 INJECTION, SOLUTION INTRAMUSCULAR; INTRAVENOUS EVERY 6 HOURS PRN
Status: DISCONTINUED | OUTPATIENT
Start: 2023-05-12 | End: 2023-05-16 | Stop reason: HOSPADM

## 2023-05-12 RX ORDER — OXYCODONE HYDROCHLORIDE AND ACETAMINOPHEN 5; 325 MG/1; MG/1
1 TABLET ORAL EVERY 4 HOURS PRN
Status: ON HOLD | COMMUNITY
End: 2023-05-16 | Stop reason: HOSPADM

## 2023-05-12 RX ORDER — PROMETHAZINE HYDROCHLORIDE 12.5 MG/1
12.5 TABLET ORAL EVERY 6 HOURS PRN
COMMUNITY

## 2023-05-12 RX ORDER — ENOXAPARIN SODIUM 100 MG/ML
40 INJECTION SUBCUTANEOUS DAILY
Status: DISCONTINUED | OUTPATIENT
Start: 2023-05-12 | End: 2023-05-16 | Stop reason: HOSPADM

## 2023-05-12 RX ORDER — LANOLIN ALCOHOL/MO/W.PET/CERES
3 CREAM (GRAM) TOPICAL NIGHTLY PRN
Status: DISCONTINUED | OUTPATIENT
Start: 2023-05-12 | End: 2023-05-16 | Stop reason: HOSPADM

## 2023-05-12 RX ORDER — TAMSULOSIN HYDROCHLORIDE 0.4 MG/1
0.4 CAPSULE ORAL DAILY
Status: DISCONTINUED | OUTPATIENT
Start: 2023-05-13 | End: 2023-05-16 | Stop reason: HOSPADM

## 2023-05-12 RX ORDER — POTASSIUM CHLORIDE 7.45 MG/ML
10 INJECTION INTRAVENOUS PRN
Status: DISCONTINUED | OUTPATIENT
Start: 2023-05-12 | End: 2023-05-16 | Stop reason: HOSPADM

## 2023-05-12 RX ORDER — POTASSIUM CHLORIDE 20 MEQ/1
40 TABLET, EXTENDED RELEASE ORAL PRN
Status: DISCONTINUED | OUTPATIENT
Start: 2023-05-12 | End: 2023-05-16 | Stop reason: HOSPADM

## 2023-05-12 RX ADMIN — SODIUM CHLORIDE, POTASSIUM CHLORIDE, SODIUM LACTATE AND CALCIUM CHLORIDE: 600; 310; 30; 20 INJECTION, SOLUTION INTRAVENOUS at 23:38

## 2023-05-12 RX ADMIN — KETOROLAC TROMETHAMINE 15 MG: 30 INJECTION, SOLUTION INTRAMUSCULAR at 15:52

## 2023-05-12 RX ADMIN — MORPHINE SULFATE 4 MG: 4 INJECTION, SOLUTION INTRAMUSCULAR; INTRAVENOUS at 13:50

## 2023-05-12 RX ADMIN — ACETAMINOPHEN 650 MG: 325 TABLET ORAL at 17:30

## 2023-05-12 RX ADMIN — HYDROMORPHONE HYDROCHLORIDE 1 MG: 1 INJECTION, SOLUTION INTRAMUSCULAR; INTRAVENOUS; SUBCUTANEOUS at 17:41

## 2023-05-12 RX ADMIN — ONDANSETRON 4 MG: 4 TABLET, ORALLY DISINTEGRATING ORAL at 18:33

## 2023-05-12 RX ADMIN — MORPHINE SULFATE 4 MG: 4 INJECTION, SOLUTION INTRAMUSCULAR; INTRAVENOUS at 20:48

## 2023-05-12 RX ADMIN — ONDANSETRON 4 MG: 2 INJECTION INTRAMUSCULAR; INTRAVENOUS at 13:49

## 2023-05-12 RX ADMIN — PROCHLORPERAZINE EDISYLATE 10 MG: 5 INJECTION INTRAMUSCULAR; INTRAVENOUS at 20:48

## 2023-05-12 ASSESSMENT — PAIN DESCRIPTION - LOCATION
LOCATION: PELVIS
LOCATION: PELVIS
LOCATION: ABDOMEN;BACK;FLANK
LOCATION: PELVIS
LOCATION: FLANK

## 2023-05-12 ASSESSMENT — PAIN SCALES - GENERAL
PAINLEVEL_OUTOF10: 7
PAINLEVEL_OUTOF10: 6
PAINLEVEL_OUTOF10: 4
PAINLEVEL_OUTOF10: 6
PAINLEVEL_OUTOF10: 7
PAINLEVEL_OUTOF10: 6

## 2023-05-12 ASSESSMENT — PAIN - FUNCTIONAL ASSESSMENT
PAIN_FUNCTIONAL_ASSESSMENT: ACTIVITIES ARE NOT PREVENTED
PAIN_FUNCTIONAL_ASSESSMENT: PREVENTS OR INTERFERES SOME ACTIVE ACTIVITIES AND ADLS
PAIN_FUNCTIONAL_ASSESSMENT: 0-10
PAIN_FUNCTIONAL_ASSESSMENT: 0-10

## 2023-05-12 ASSESSMENT — PAIN DESCRIPTION - ORIENTATION
ORIENTATION: LEFT
ORIENTATION: LEFT;RIGHT
ORIENTATION: LEFT

## 2023-05-12 ASSESSMENT — PAIN DESCRIPTION - PAIN TYPE
TYPE: ACUTE PAIN
TYPE: ACUTE PAIN

## 2023-05-12 ASSESSMENT — PAIN DESCRIPTION - DESCRIPTORS
DESCRIPTORS: ACHING;SHARP
DESCRIPTORS: SPASM;SHARP
DESCRIPTORS: SPASM
DESCRIPTORS: SHARP;SPASM

## 2023-05-12 ASSESSMENT — PAIN DESCRIPTION - FREQUENCY
FREQUENCY: CONTINUOUS
FREQUENCY: CONTINUOUS

## 2023-05-12 ASSESSMENT — PAIN DESCRIPTION - ONSET: ONSET: PROGRESSIVE

## 2023-05-13 ENCOUNTER — APPOINTMENT (OUTPATIENT)
Dept: GENERAL RADIOLOGY | Age: 38
DRG: 661 | End: 2023-05-13
Payer: COMMERCIAL

## 2023-05-13 ENCOUNTER — ANESTHESIA EVENT (OUTPATIENT)
Dept: OPERATING ROOM | Age: 38
End: 2023-05-13
Payer: COMMERCIAL

## 2023-05-13 ENCOUNTER — ANESTHESIA (OUTPATIENT)
Dept: OPERATING ROOM | Age: 38
End: 2023-05-13
Payer: COMMERCIAL

## 2023-05-13 LAB
ANION GAP SERPL CALCULATED.3IONS-SCNC: 8 MMOL/L (ref 3–16)
BASOPHILS # BLD: 0 K/UL (ref 0–0.2)
BASOPHILS NFR BLD: 0.2 %
BUN SERPL-MCNC: 8 MG/DL (ref 7–20)
CALCIUM SERPL-MCNC: 8 MG/DL (ref 8.3–10.6)
CHLORIDE SERPL-SCNC: 104 MMOL/L (ref 99–110)
CO2 SERPL-SCNC: 26 MMOL/L (ref 21–32)
CREAT SERPL-MCNC: 0.7 MG/DL (ref 0.6–1.1)
DEPRECATED RDW RBC AUTO: 12.6 % (ref 12.4–15.4)
EKG ATRIAL RATE: 54 BPM
EKG DIAGNOSIS: NORMAL
EKG P AXIS: 62 DEGREES
EKG P-R INTERVAL: 158 MS
EKG Q-T INTERVAL: 452 MS
EKG QRS DURATION: 82 MS
EKG QTC CALCULATION (BAZETT): 428 MS
EKG R AXIS: 76 DEGREES
EKG T AXIS: 42 DEGREES
EKG VENTRICULAR RATE: 54 BPM
EOSINOPHIL # BLD: 0.3 K/UL (ref 0–0.6)
EOSINOPHIL NFR BLD: 4.2 %
GFR SERPLBLD CREATININE-BSD FMLA CKD-EPI: >60 ML/MIN/{1.73_M2}
GLUCOSE SERPL-MCNC: 96 MG/DL (ref 70–99)
HCT VFR BLD AUTO: 37.5 % (ref 36–48)
HGB BLD-MCNC: 12.8 G/DL (ref 12–16)
LYMPHOCYTES # BLD: 2.2 K/UL (ref 1–5.1)
LYMPHOCYTES NFR BLD: 32.5 %
MAGNESIUM SERPL-MCNC: 2 MG/DL (ref 1.8–2.4)
MCH RBC QN AUTO: 29.4 PG (ref 26–34)
MCHC RBC AUTO-ENTMCNC: 34 G/DL (ref 31–36)
MCV RBC AUTO: 86.5 FL (ref 80–100)
MONOCYTES # BLD: 0.8 K/UL (ref 0–1.3)
MONOCYTES NFR BLD: 12.2 %
NEUTROPHILS # BLD: 3.4 K/UL (ref 1.7–7.7)
NEUTROPHILS NFR BLD: 50.9 %
PLATELET # BLD AUTO: 269 K/UL (ref 135–450)
PMV BLD AUTO: 7.6 FL (ref 5–10.5)
POTASSIUM SERPL-SCNC: 3.7 MMOL/L (ref 3.5–5.1)
RBC # BLD AUTO: 4.34 M/UL (ref 4–5.2)
SODIUM SERPL-SCNC: 138 MMOL/L (ref 136–145)
WBC # BLD AUTO: 6.6 K/UL (ref 4–11)

## 2023-05-13 PROCEDURE — 6360000002 HC RX W HCPCS: Performed by: ANESTHESIOLOGY

## 2023-05-13 PROCEDURE — C1769 GUIDE WIRE: HCPCS | Performed by: UROLOGY

## 2023-05-13 PROCEDURE — 3700000000 HC ANESTHESIA ATTENDED CARE: Performed by: UROLOGY

## 2023-05-13 PROCEDURE — 6360000002 HC RX W HCPCS: Performed by: HOSPITALIST

## 2023-05-13 PROCEDURE — 3209999900 FLUORO FOR SURGICAL PROCEDURES

## 2023-05-13 PROCEDURE — 6370000000 HC RX 637 (ALT 250 FOR IP): Performed by: INTERNAL MEDICINE

## 2023-05-13 PROCEDURE — 7100000001 HC PACU RECOVERY - ADDTL 15 MIN: Performed by: UROLOGY

## 2023-05-13 PROCEDURE — G0378 HOSPITAL OBSERVATION PER HR: HCPCS

## 2023-05-13 PROCEDURE — 80048 BASIC METABOLIC PNL TOTAL CA: CPT

## 2023-05-13 PROCEDURE — C2617 STENT, NON-COR, TEM W/O DEL: HCPCS | Performed by: UROLOGY

## 2023-05-13 PROCEDURE — 83735 ASSAY OF MAGNESIUM: CPT

## 2023-05-13 PROCEDURE — 93010 ELECTROCARDIOGRAM REPORT: CPT | Performed by: INTERNAL MEDICINE

## 2023-05-13 PROCEDURE — A4217 STERILE WATER/SALINE, 500 ML: HCPCS | Performed by: UROLOGY

## 2023-05-13 PROCEDURE — 3700000001 HC ADD 15 MINUTES (ANESTHESIA): Performed by: UROLOGY

## 2023-05-13 PROCEDURE — 3600000014 HC SURGERY LEVEL 4 ADDTL 15MIN: Performed by: UROLOGY

## 2023-05-13 PROCEDURE — 6360000002 HC RX W HCPCS: Performed by: INTERNAL MEDICINE

## 2023-05-13 PROCEDURE — 85025 COMPLETE CBC W/AUTO DIFF WBC: CPT

## 2023-05-13 PROCEDURE — 6360000002 HC RX W HCPCS: Performed by: EMERGENCY MEDICINE

## 2023-05-13 PROCEDURE — 2709999900 HC NON-CHARGEABLE SUPPLY: Performed by: UROLOGY

## 2023-05-13 PROCEDURE — 2500000003 HC RX 250 WO HCPCS: Performed by: ANESTHESIOLOGY

## 2023-05-13 PROCEDURE — 2580000003 HC RX 258: Performed by: UROLOGY

## 2023-05-13 PROCEDURE — 74018 RADEX ABDOMEN 1 VIEW: CPT

## 2023-05-13 PROCEDURE — 7100000000 HC PACU RECOVERY - FIRST 15 MIN: Performed by: UROLOGY

## 2023-05-13 PROCEDURE — 2580000003 HC RX 258: Performed by: HOSPITALIST

## 2023-05-13 PROCEDURE — 36415 COLL VENOUS BLD VENIPUNCTURE: CPT

## 2023-05-13 PROCEDURE — 2580000003 HC RX 258: Performed by: INTERNAL MEDICINE

## 2023-05-13 PROCEDURE — 3600000004 HC SURGERY LEVEL 4 BASE: Performed by: UROLOGY

## 2023-05-13 PROCEDURE — 0T778DZ DILATION OF LEFT URETER WITH INTRALUMINAL DEVICE, VIA NATURAL OR ARTIFICIAL OPENING ENDOSCOPIC: ICD-10-PCS | Performed by: UROLOGY

## 2023-05-13 DEVICE — URETERAL STENT
Type: IMPLANTABLE DEVICE | Site: URETER | Status: NON-FUNCTIONAL
Brand: CONTOUR™
Removed: 2023-05-15

## 2023-05-13 RX ORDER — SODIUM CHLORIDE 0.9 % (FLUSH) 0.9 %
5-40 SYRINGE (ML) INJECTION PRN
Status: DISCONTINUED | OUTPATIENT
Start: 2023-05-13 | End: 2023-05-13 | Stop reason: HOSPADM

## 2023-05-13 RX ORDER — MIDAZOLAM HYDROCHLORIDE 1 MG/ML
INJECTION INTRAMUSCULAR; INTRAVENOUS PRN
Status: DISCONTINUED | OUTPATIENT
Start: 2023-05-13 | End: 2023-05-13 | Stop reason: SDUPTHER

## 2023-05-13 RX ORDER — SODIUM CHLORIDE 9 MG/ML
INJECTION, SOLUTION INTRAVENOUS CONTINUOUS
Status: DISCONTINUED | OUTPATIENT
Start: 2023-05-13 | End: 2023-05-16 | Stop reason: HOSPADM

## 2023-05-13 RX ORDER — SODIUM CHLORIDE 9 MG/ML
INJECTION, SOLUTION INTRAVENOUS PRN
Status: DISCONTINUED | OUTPATIENT
Start: 2023-05-13 | End: 2023-05-13 | Stop reason: HOSPADM

## 2023-05-13 RX ORDER — PROPOFOL 10 MG/ML
INJECTION, EMULSION INTRAVENOUS PRN
Status: DISCONTINUED | OUTPATIENT
Start: 2023-05-13 | End: 2023-05-13 | Stop reason: SDUPTHER

## 2023-05-13 RX ORDER — LABETALOL HYDROCHLORIDE 5 MG/ML
10 INJECTION, SOLUTION INTRAVENOUS
Status: DISCONTINUED | OUTPATIENT
Start: 2023-05-13 | End: 2023-05-13 | Stop reason: HOSPADM

## 2023-05-13 RX ORDER — OXYCODONE HYDROCHLORIDE 5 MG/1
10 TABLET ORAL PRN
Status: DISCONTINUED | OUTPATIENT
Start: 2023-05-13 | End: 2023-05-13 | Stop reason: HOSPADM

## 2023-05-13 RX ORDER — HYDROMORPHONE HCL 110MG/55ML
PATIENT CONTROLLED ANALGESIA SYRINGE INTRAVENOUS PRN
Status: DISCONTINUED | OUTPATIENT
Start: 2023-05-13 | End: 2023-05-13 | Stop reason: SDUPTHER

## 2023-05-13 RX ORDER — ONDANSETRON 2 MG/ML
4 INJECTION INTRAMUSCULAR; INTRAVENOUS EVERY 30 MIN PRN
Status: DISCONTINUED | OUTPATIENT
Start: 2023-05-13 | End: 2023-05-13 | Stop reason: HOSPADM

## 2023-05-13 RX ORDER — ROCURONIUM BROMIDE 10 MG/ML
INJECTION, SOLUTION INTRAVENOUS PRN
Status: DISCONTINUED | OUTPATIENT
Start: 2023-05-13 | End: 2023-05-13 | Stop reason: SDUPTHER

## 2023-05-13 RX ORDER — SODIUM CHLORIDE 0.9 % (FLUSH) 0.9 %
5-40 SYRINGE (ML) INJECTION EVERY 12 HOURS SCHEDULED
Status: DISCONTINUED | OUTPATIENT
Start: 2023-05-13 | End: 2023-05-13 | Stop reason: HOSPADM

## 2023-05-13 RX ORDER — OXYCODONE HYDROCHLORIDE 5 MG/1
5 TABLET ORAL PRN
Status: DISCONTINUED | OUTPATIENT
Start: 2023-05-13 | End: 2023-05-13 | Stop reason: HOSPADM

## 2023-05-13 RX ORDER — MAGNESIUM HYDROXIDE 1200 MG/15ML
LIQUID ORAL CONTINUOUS PRN
Status: COMPLETED | OUTPATIENT
Start: 2023-05-13 | End: 2023-05-13

## 2023-05-13 RX ORDER — LIDOCAINE HYDROCHLORIDE 20 MG/ML
INJECTION, SOLUTION EPIDURAL; INFILTRATION; INTRACAUDAL; PERINEURAL PRN
Status: DISCONTINUED | OUTPATIENT
Start: 2023-05-13 | End: 2023-05-13 | Stop reason: SDUPTHER

## 2023-05-13 RX ADMIN — ONDANSETRON 4 MG: 2 INJECTION INTRAMUSCULAR; INTRAVENOUS at 12:50

## 2023-05-13 RX ADMIN — SODIUM CHLORIDE: 9 INJECTION, SOLUTION INTRAVENOUS at 15:25

## 2023-05-13 RX ADMIN — MORPHINE SULFATE 4 MG: 4 INJECTION, SOLUTION INTRAMUSCULAR; INTRAVENOUS at 17:39

## 2023-05-13 RX ADMIN — PROPOFOL 200 MG: 10 INJECTION, EMULSION INTRAVENOUS at 12:40

## 2023-05-13 RX ADMIN — TAMSULOSIN HYDROCHLORIDE 0.4 MG: 0.4 CAPSULE ORAL at 09:32

## 2023-05-13 RX ADMIN — ONDANSETRON 4 MG: 2 INJECTION INTRAMUSCULAR; INTRAVENOUS at 15:23

## 2023-05-13 RX ADMIN — MIDAZOLAM HYDROCHLORIDE 2 MG: 2 INJECTION, SOLUTION INTRAMUSCULAR; INTRAVENOUS at 12:36

## 2023-05-13 RX ADMIN — LIDOCAINE HYDROCHLORIDE 40 MG: 20 INJECTION, SOLUTION EPIDURAL; INFILTRATION; INTRACAUDAL; PERINEURAL at 12:39

## 2023-05-13 RX ADMIN — ONDANSETRON 4 MG: 2 INJECTION INTRAMUSCULAR; INTRAVENOUS at 13:47

## 2023-05-13 RX ADMIN — CEFTRIAXONE SODIUM 1000 MG: 1 INJECTION, POWDER, FOR SOLUTION INTRAMUSCULAR; INTRAVENOUS at 15:30

## 2023-05-13 RX ADMIN — HYDROMORPHONE HYDROCHLORIDE 0.5 MG: 1 INJECTION, SOLUTION INTRAMUSCULAR; INTRAVENOUS; SUBCUTANEOUS at 14:07

## 2023-05-13 RX ADMIN — PROCHLORPERAZINE EDISYLATE 10 MG: 5 INJECTION INTRAMUSCULAR; INTRAVENOUS at 16:53

## 2023-05-13 RX ADMIN — HYDROMORPHONE HYDROCHLORIDE 0.5 MG: 2 INJECTION, SOLUTION INTRAMUSCULAR; INTRAVENOUS; SUBCUTANEOUS at 12:36

## 2023-05-13 RX ADMIN — HYDROMORPHONE HYDROCHLORIDE 0.5 MG: 1 INJECTION, SOLUTION INTRAMUSCULAR; INTRAVENOUS; SUBCUTANEOUS at 13:08

## 2023-05-13 RX ADMIN — HYDROMORPHONE HYDROCHLORIDE 0.5 MG: 1 INJECTION, SOLUTION INTRAMUSCULAR; INTRAVENOUS; SUBCUTANEOUS at 13:41

## 2023-05-13 RX ADMIN — ROCURONIUM BROMIDE 5 MG: 10 SOLUTION INTRAVENOUS at 12:39

## 2023-05-13 RX ADMIN — MORPHINE SULFATE 4 MG: 4 INJECTION, SOLUTION INTRAMUSCULAR; INTRAVENOUS at 15:23

## 2023-05-13 RX ADMIN — IBUPROFEN 800 MG: 800 TABLET, FILM COATED ORAL at 16:53

## 2023-05-13 RX ADMIN — SODIUM CHLORIDE, POTASSIUM CHLORIDE, SODIUM LACTATE AND CALCIUM CHLORIDE: 600; 310; 30; 20 INJECTION, SOLUTION INTRAVENOUS at 06:18

## 2023-05-13 ASSESSMENT — PAIN DESCRIPTION - DESCRIPTORS
DESCRIPTORS: ACHING
DESCRIPTORS: ACHING
DESCRIPTORS: BURNING
DESCRIPTORS: SHARP
DESCRIPTORS: ACHING;BURNING

## 2023-05-13 ASSESSMENT — PAIN SCALES - GENERAL
PAINLEVEL_OUTOF10: 9
PAINLEVEL_OUTOF10: 7
PAINLEVEL_OUTOF10: 10
PAINLEVEL_OUTOF10: 6
PAINLEVEL_OUTOF10: 10

## 2023-05-13 ASSESSMENT — PAIN DESCRIPTION - LOCATION
LOCATION: ABDOMEN
LOCATION: VAGINA
LOCATION: VAGINA

## 2023-05-13 ASSESSMENT — PAIN DESCRIPTION - ORIENTATION
ORIENTATION: LEFT

## 2023-05-13 NOTE — ANESTHESIA PRE PROCEDURE
Anesthesia Evaluation    Airway: Mallampati: I  TM distance: <3 FB     Mouth opening: > = 3 FB   Dental: normal exam         Pulmonary: breath sounds clear to auscultation                             Cardiovascular:            Rhythm: regular  Rate: normal                    Neuro/Psych:               GI/Hepatic/Renal:             Endo/Other:                     Abdominal:             Vascular: Other Findings:           Anesthesia Plan      general     ASA 2     (Pt agrees to risks. Benefits and options of GETA. Questions answered. Willing to proceed.)  Induction: intravenous. Anesthetic plan and risks discussed with patient.                         Salome Mora MD   5/13/2023

## 2023-05-13 NOTE — ANESTHESIA POSTPROCEDURE EVALUATION
Department of Anesthesiology  Postprocedure Note    Patient: Lui Champagne  MRN: [id number]  YOB: 1985  Date of evaluation: 5/13/2023      Procedure Summary     Date: 05/13/23 Room / Location: [address]    Anesthesia Start: 1513 Anesthesia Stop: 8206    Procedure: LEFT URETEROSCOPY,  LEFT STENT PLACEMENT (Left: Ureter) Diagnosis:       Ureterolithiasis      (URETEROLITHIASIS)    Surgeons: Tracy Gurrola MD Responsible Provider: Cristhian Oden MD    Anesthesia Type: general ASA Status: 2          Anesthesia Type: No value filed.     Claire Phase I: Claire Score: 10    Claire Phase II:        Anesthesia Post Evaluation    Patient location during evaluation: PACU  Level of consciousness: awake and alert  Airway patency: patent  Nausea & Vomiting: no vomiting  Complications: no  Cardiovascular status: blood pressure returned to baseline  Respiratory status: acceptable  Hydration status: stable

## 2023-05-14 LAB
ANION GAP SERPL CALCULATED.3IONS-SCNC: 7 MMOL/L (ref 3–16)
BASOPHILS # BLD: 0 K/UL (ref 0–0.2)
BASOPHILS NFR BLD: 0.4 %
BUN SERPL-MCNC: 9 MG/DL (ref 7–20)
CALCIUM SERPL-MCNC: 7.4 MG/DL (ref 8.3–10.6)
CHLORIDE SERPL-SCNC: 108 MMOL/L (ref 99–110)
CO2 SERPL-SCNC: 25 MMOL/L (ref 21–32)
CREAT SERPL-MCNC: 0.6 MG/DL (ref 0.6–1.1)
DEPRECATED RDW RBC AUTO: 12.8 % (ref 12.4–15.4)
EOSINOPHIL # BLD: 0.3 K/UL (ref 0–0.6)
EOSINOPHIL NFR BLD: 3.3 %
GFR SERPLBLD CREATININE-BSD FMLA CKD-EPI: >60 ML/MIN/{1.73_M2}
GLUCOSE SERPL-MCNC: 105 MG/DL (ref 70–99)
HCT VFR BLD AUTO: 36.3 % (ref 36–48)
HGB BLD-MCNC: 12.4 G/DL (ref 12–16)
LYMPHOCYTES # BLD: 1.6 K/UL (ref 1–5.1)
LYMPHOCYTES NFR BLD: 20.4 %
MAGNESIUM SERPL-MCNC: 2 MG/DL (ref 1.8–2.4)
MCH RBC QN AUTO: 29.7 PG (ref 26–34)
MCHC RBC AUTO-ENTMCNC: 34.2 G/DL (ref 31–36)
MCV RBC AUTO: 86.9 FL (ref 80–100)
MONOCYTES # BLD: 0.9 K/UL (ref 0–1.3)
MONOCYTES NFR BLD: 11.7 %
NEUTROPHILS # BLD: 5.1 K/UL (ref 1.7–7.7)
NEUTROPHILS NFR BLD: 64.2 %
PLATELET # BLD AUTO: 247 K/UL (ref 135–450)
PMV BLD AUTO: 7.3 FL (ref 5–10.5)
POTASSIUM SERPL-SCNC: 3.7 MMOL/L (ref 3.5–5.1)
RBC # BLD AUTO: 4.18 M/UL (ref 4–5.2)
SODIUM SERPL-SCNC: 140 MMOL/L (ref 136–145)
WBC # BLD AUTO: 7.9 K/UL (ref 4–11)

## 2023-05-14 PROCEDURE — 6360000002 HC RX W HCPCS: Performed by: NURSE PRACTITIONER

## 2023-05-14 PROCEDURE — G0378 HOSPITAL OBSERVATION PER HR: HCPCS

## 2023-05-14 PROCEDURE — 85025 COMPLETE CBC W/AUTO DIFF WBC: CPT

## 2023-05-14 PROCEDURE — 6370000000 HC RX 637 (ALT 250 FOR IP): Performed by: INTERNAL MEDICINE

## 2023-05-14 PROCEDURE — 83735 ASSAY OF MAGNESIUM: CPT

## 2023-05-14 PROCEDURE — 2580000003 HC RX 258: Performed by: HOSPITALIST

## 2023-05-14 PROCEDURE — 36415 COLL VENOUS BLD VENIPUNCTURE: CPT

## 2023-05-14 PROCEDURE — 6370000000 HC RX 637 (ALT 250 FOR IP): Performed by: HOSPITALIST

## 2023-05-14 PROCEDURE — 80048 BASIC METABOLIC PNL TOTAL CA: CPT

## 2023-05-14 PROCEDURE — 6360000002 HC RX W HCPCS: Performed by: HOSPITALIST

## 2023-05-14 PROCEDURE — 6360000002 HC RX W HCPCS: Performed by: INTERNAL MEDICINE

## 2023-05-14 RX ORDER — PHENAZOPYRIDINE HYDROCHLORIDE 100 MG/1
200 TABLET, FILM COATED ORAL
Status: DISCONTINUED | OUTPATIENT
Start: 2023-05-14 | End: 2023-05-16 | Stop reason: HOSPADM

## 2023-05-14 RX ORDER — MORPHINE SULFATE 4 MG/ML
4 INJECTION, SOLUTION INTRAMUSCULAR; INTRAVENOUS
Status: COMPLETED | OUTPATIENT
Start: 2023-05-14 | End: 2023-05-15

## 2023-05-14 RX ADMIN — SODIUM CHLORIDE 1000 ML: 9 INJECTION, SOLUTION INTRAVENOUS at 04:58

## 2023-05-14 RX ADMIN — PHENAZOPYRIDINE 200 MG: 100 TABLET ORAL at 15:57

## 2023-05-14 RX ADMIN — MORPHINE SULFATE 4 MG: 4 INJECTION, SOLUTION INTRAMUSCULAR; INTRAVENOUS at 16:23

## 2023-05-14 RX ADMIN — TAMSULOSIN HYDROCHLORIDE 0.4 MG: 0.4 CAPSULE ORAL at 09:37

## 2023-05-14 RX ADMIN — ENOXAPARIN SODIUM 40 MG: 100 INJECTION SUBCUTANEOUS at 09:37

## 2023-05-14 RX ADMIN — CEFTRIAXONE SODIUM 1000 MG: 1 INJECTION, POWDER, FOR SOLUTION INTRAMUSCULAR; INTRAVENOUS at 13:37

## 2023-05-14 RX ADMIN — HYDROMORPHONE HYDROCHLORIDE 1 MG: 1 INJECTION, SOLUTION INTRAMUSCULAR; INTRAVENOUS; SUBCUTANEOUS at 04:59

## 2023-05-14 RX ADMIN — MORPHINE SULFATE 4 MG: 4 INJECTION, SOLUTION INTRAMUSCULAR; INTRAVENOUS at 21:22

## 2023-05-14 RX ADMIN — SODIUM CHLORIDE: 9 INJECTION, SOLUTION INTRAVENOUS at 15:16

## 2023-05-14 RX ADMIN — ONDANSETRON 4 MG: 2 INJECTION INTRAMUSCULAR; INTRAVENOUS at 16:49

## 2023-05-14 RX ADMIN — SODIUM CHLORIDE: 9 INJECTION, SOLUTION INTRAVENOUS at 11:51

## 2023-05-14 RX ADMIN — KETOROLAC TROMETHAMINE 30 MG: 30 INJECTION, SOLUTION INTRAMUSCULAR at 05:01

## 2023-05-14 ASSESSMENT — PAIN SCALES - GENERAL
PAINLEVEL_OUTOF10: 5
PAINLEVEL_OUTOF10: 7
PAINLEVEL_OUTOF10: 4
PAINLEVEL_OUTOF10: 8

## 2023-05-14 ASSESSMENT — PAIN DESCRIPTION - DESCRIPTORS
DESCRIPTORS: PRESSURE;BURNING
DESCRIPTORS: PRESSURE;BURNING
DESCRIPTORS: ACHING;SHARP
DESCRIPTORS: ACHING

## 2023-05-14 ASSESSMENT — PAIN - FUNCTIONAL ASSESSMENT
PAIN_FUNCTIONAL_ASSESSMENT: ACTIVITIES ARE NOT PREVENTED
PAIN_FUNCTIONAL_ASSESSMENT: ACTIVITIES ARE NOT PREVENTED

## 2023-05-14 ASSESSMENT — PAIN DESCRIPTION - PAIN TYPE: TYPE: ACUTE PAIN

## 2023-05-14 ASSESSMENT — PAIN DESCRIPTION - LOCATION
LOCATION: ABDOMEN
LOCATION: ABDOMEN
LOCATION: PERINEUM
LOCATION: ABDOMEN

## 2023-05-14 ASSESSMENT — PAIN DESCRIPTION - ORIENTATION: ORIENTATION: LOWER

## 2023-05-14 ASSESSMENT — PAIN DESCRIPTION - ONSET: ONSET: ON-GOING

## 2023-05-15 ENCOUNTER — ANESTHESIA EVENT (OUTPATIENT)
Dept: OPERATING ROOM | Age: 38
DRG: 660 | End: 2023-05-15
Payer: COMMERCIAL

## 2023-05-15 ENCOUNTER — APPOINTMENT (OUTPATIENT)
Dept: GENERAL RADIOLOGY | Age: 38
DRG: 661 | End: 2023-05-15
Payer: COMMERCIAL

## 2023-05-15 ENCOUNTER — ANESTHESIA (OUTPATIENT)
Dept: OPERATING ROOM | Age: 38
DRG: 660 | End: 2023-05-15
Payer: COMMERCIAL

## 2023-05-15 PROBLEM — N13.5 URETERAL OBSTRUCTION, LEFT: Status: ACTIVE | Noted: 2023-05-15

## 2023-05-15 LAB
ANION GAP SERPL CALCULATED.3IONS-SCNC: 9 MMOL/L (ref 3–16)
BASOPHILS # BLD: 0 K/UL (ref 0–0.2)
BASOPHILS NFR BLD: 0.2 %
BUN SERPL-MCNC: 4 MG/DL (ref 7–20)
CALCIUM SERPL-MCNC: 7.6 MG/DL (ref 8.3–10.6)
CHLORIDE SERPL-SCNC: 104 MMOL/L (ref 99–110)
CO2 SERPL-SCNC: 24 MMOL/L (ref 21–32)
CREAT SERPL-MCNC: <0.5 MG/DL (ref 0.6–1.1)
DEPRECATED RDW RBC AUTO: 12.6 % (ref 12.4–15.4)
EOSINOPHIL # BLD: 0.3 K/UL (ref 0–0.6)
EOSINOPHIL NFR BLD: 4.1 %
GFR SERPLBLD CREATININE-BSD FMLA CKD-EPI: >60 ML/MIN/{1.73_M2}
GLUCOSE SERPL-MCNC: 101 MG/DL (ref 70–99)
HCT VFR BLD AUTO: 36.9 % (ref 36–48)
HGB BLD-MCNC: 12.5 G/DL (ref 12–16)
LYMPHOCYTES # BLD: 1.9 K/UL (ref 1–5.1)
LYMPHOCYTES NFR BLD: 30.2 %
MAGNESIUM SERPL-MCNC: 2 MG/DL (ref 1.8–2.4)
MCH RBC QN AUTO: 29.5 PG (ref 26–34)
MCHC RBC AUTO-ENTMCNC: 34 G/DL (ref 31–36)
MCV RBC AUTO: 86.9 FL (ref 80–100)
MONOCYTES # BLD: 0.8 K/UL (ref 0–1.3)
MONOCYTES NFR BLD: 11.7 %
NEUTROPHILS # BLD: 3.5 K/UL (ref 1.7–7.7)
NEUTROPHILS NFR BLD: 53.8 %
PLATELET # BLD AUTO: 247 K/UL (ref 135–450)
PMV BLD AUTO: 7.4 FL (ref 5–10.5)
POTASSIUM SERPL-SCNC: 3.6 MMOL/L (ref 3.5–5.1)
RBC # BLD AUTO: 4.24 M/UL (ref 4–5.2)
SODIUM SERPL-SCNC: 137 MMOL/L (ref 136–145)
WBC # BLD AUTO: 6.4 K/UL (ref 4–11)

## 2023-05-15 PROCEDURE — 83735 ASSAY OF MAGNESIUM: CPT

## 2023-05-15 PROCEDURE — 6360000002 HC RX W HCPCS: Performed by: NURSE PRACTITIONER

## 2023-05-15 PROCEDURE — 2500000003 HC RX 250 WO HCPCS

## 2023-05-15 PROCEDURE — C1769 GUIDE WIRE: HCPCS | Performed by: UROLOGY

## 2023-05-15 PROCEDURE — 2580000003 HC RX 258: Performed by: HOSPITALIST

## 2023-05-15 PROCEDURE — 6370000000 HC RX 637 (ALT 250 FOR IP): Performed by: INTERNAL MEDICINE

## 2023-05-15 PROCEDURE — 88300 SURGICAL PATH GROSS: CPT

## 2023-05-15 PROCEDURE — 2500000003 HC RX 250 WO HCPCS: Performed by: INTERNAL MEDICINE

## 2023-05-15 PROCEDURE — 36415 COLL VENOUS BLD VENIPUNCTURE: CPT

## 2023-05-15 PROCEDURE — 1200000000 HC SEMI PRIVATE

## 2023-05-15 PROCEDURE — 3700000000 HC ANESTHESIA ATTENDED CARE: Performed by: UROLOGY

## 2023-05-15 PROCEDURE — 6360000002 HC RX W HCPCS: Performed by: HOSPITALIST

## 2023-05-15 PROCEDURE — 3600000014 HC SURGERY LEVEL 4 ADDTL 15MIN: Performed by: UROLOGY

## 2023-05-15 PROCEDURE — 82365 CALCULUS SPECTROSCOPY: CPT

## 2023-05-15 PROCEDURE — 7100000001 HC PACU RECOVERY - ADDTL 15 MIN: Performed by: UROLOGY

## 2023-05-15 PROCEDURE — 7100000000 HC PACU RECOVERY - FIRST 15 MIN: Performed by: UROLOGY

## 2023-05-15 PROCEDURE — 6370000000 HC RX 637 (ALT 250 FOR IP): Performed by: HOSPITALIST

## 2023-05-15 PROCEDURE — 2720000010 HC SURG SUPPLY STERILE: Performed by: UROLOGY

## 2023-05-15 PROCEDURE — 2500000003 HC RX 250 WO HCPCS: Performed by: ANESTHESIOLOGY

## 2023-05-15 PROCEDURE — 2580000003 HC RX 258: Performed by: UROLOGY

## 2023-05-15 PROCEDURE — 3700000001 HC ADD 15 MINUTES (ANESTHESIA): Performed by: UROLOGY

## 2023-05-15 PROCEDURE — 80048 BASIC METABOLIC PNL TOTAL CA: CPT

## 2023-05-15 PROCEDURE — 3600000004 HC SURGERY LEVEL 4 BASE: Performed by: UROLOGY

## 2023-05-15 PROCEDURE — 6360000002 HC RX W HCPCS: Performed by: INTERNAL MEDICINE

## 2023-05-15 PROCEDURE — 6360000002 HC RX W HCPCS

## 2023-05-15 PROCEDURE — 6360000002 HC RX W HCPCS: Performed by: ANESTHESIOLOGY

## 2023-05-15 PROCEDURE — 85025 COMPLETE CBC W/AUTO DIFF WBC: CPT

## 2023-05-15 PROCEDURE — 2709999900 HC NON-CHARGEABLE SUPPLY: Performed by: UROLOGY

## 2023-05-15 RX ORDER — LACTOBACILLUS RHAMNOSUS GG 10B CELL
1 CAPSULE ORAL 2 TIMES DAILY WITH MEALS
Status: DISCONTINUED | OUTPATIENT
Start: 2023-05-15 | End: 2023-05-16 | Stop reason: HOSPADM

## 2023-05-15 RX ORDER — OXYCODONE HYDROCHLORIDE 5 MG/1
10 TABLET ORAL PRN
Status: DISCONTINUED | OUTPATIENT
Start: 2023-05-15 | End: 2023-05-15 | Stop reason: HOSPADM

## 2023-05-15 RX ORDER — SODIUM CHLORIDE 9 MG/ML
25 INJECTION, SOLUTION INTRAVENOUS PRN
Status: DISCONTINUED | OUTPATIENT
Start: 2023-05-15 | End: 2023-05-15 | Stop reason: HOSPADM

## 2023-05-15 RX ORDER — MAGNESIUM HYDROXIDE 1200 MG/15ML
LIQUID ORAL PRN
Status: DISCONTINUED | OUTPATIENT
Start: 2023-05-15 | End: 2023-05-15 | Stop reason: ALTCHOICE

## 2023-05-15 RX ORDER — KETOROLAC TROMETHAMINE 30 MG/ML
INJECTION, SOLUTION INTRAMUSCULAR; INTRAVENOUS PRN
Status: DISCONTINUED | OUTPATIENT
Start: 2023-05-15 | End: 2023-05-15 | Stop reason: SDUPTHER

## 2023-05-15 RX ORDER — SODIUM CHLORIDE 0.9 % (FLUSH) 0.9 %
5-40 SYRINGE (ML) INJECTION EVERY 12 HOURS SCHEDULED
Status: DISCONTINUED | OUTPATIENT
Start: 2023-05-15 | End: 2023-05-15 | Stop reason: HOSPADM

## 2023-05-15 RX ORDER — LIDOCAINE HYDROCHLORIDE 20 MG/ML
INJECTION, SOLUTION EPIDURAL; INFILTRATION; INTRACAUDAL; PERINEURAL PRN
Status: DISCONTINUED | OUTPATIENT
Start: 2023-05-15 | End: 2023-05-15 | Stop reason: SDUPTHER

## 2023-05-15 RX ORDER — SODIUM CHLORIDE, SODIUM LACTATE, POTASSIUM CHLORIDE, CALCIUM CHLORIDE 600; 310; 30; 20 MG/100ML; MG/100ML; MG/100ML; MG/100ML
INJECTION, SOLUTION INTRAVENOUS CONTINUOUS
Status: DISCONTINUED | OUTPATIENT
Start: 2023-05-15 | End: 2023-05-15 | Stop reason: HOSPADM

## 2023-05-15 RX ORDER — OXYCODONE HYDROCHLORIDE 5 MG/1
5 TABLET ORAL PRN
Status: DISCONTINUED | OUTPATIENT
Start: 2023-05-15 | End: 2023-05-15 | Stop reason: HOSPADM

## 2023-05-15 RX ORDER — MORPHINE SULFATE 4 MG/ML
4 INJECTION, SOLUTION INTRAMUSCULAR; INTRAVENOUS
Status: DISCONTINUED | OUTPATIENT
Start: 2023-05-15 | End: 2023-05-16 | Stop reason: HOSPADM

## 2023-05-15 RX ORDER — MORPHINE SULFATE 2 MG/ML
2 INJECTION, SOLUTION INTRAMUSCULAR; INTRAVENOUS
Status: DISCONTINUED | OUTPATIENT
Start: 2023-05-15 | End: 2023-05-16 | Stop reason: HOSPADM

## 2023-05-15 RX ORDER — ONDANSETRON 2 MG/ML
INJECTION INTRAMUSCULAR; INTRAVENOUS PRN
Status: DISCONTINUED | OUTPATIENT
Start: 2023-05-15 | End: 2023-05-15 | Stop reason: SDUPTHER

## 2023-05-15 RX ORDER — DIPHENHYDRAMINE HYDROCHLORIDE 50 MG/ML
12.5 INJECTION INTRAMUSCULAR; INTRAVENOUS
Status: DISCONTINUED | OUTPATIENT
Start: 2023-05-15 | End: 2023-05-15 | Stop reason: HOSPADM

## 2023-05-15 RX ORDER — SODIUM CHLORIDE 0.9 % (FLUSH) 0.9 %
5-40 SYRINGE (ML) INJECTION PRN
Status: DISCONTINUED | OUTPATIENT
Start: 2023-05-15 | End: 2023-05-15 | Stop reason: HOSPADM

## 2023-05-15 RX ORDER — ONDANSETRON 2 MG/ML
4 INJECTION INTRAMUSCULAR; INTRAVENOUS
Status: COMPLETED | OUTPATIENT
Start: 2023-05-15 | End: 2023-05-15

## 2023-05-15 RX ORDER — FAMOTIDINE 10 MG/ML
20 INJECTION, SOLUTION INTRAVENOUS ONCE
Status: COMPLETED | OUTPATIENT
Start: 2023-05-15 | End: 2023-05-15

## 2023-05-15 RX ORDER — DEXAMETHASONE SODIUM PHOSPHATE 4 MG/ML
INJECTION, SOLUTION INTRA-ARTICULAR; INTRALESIONAL; INTRAMUSCULAR; INTRAVENOUS; SOFT TISSUE PRN
Status: DISCONTINUED | OUTPATIENT
Start: 2023-05-15 | End: 2023-05-15 | Stop reason: SDUPTHER

## 2023-05-15 RX ORDER — LABETALOL HYDROCHLORIDE 5 MG/ML
10 INJECTION, SOLUTION INTRAVENOUS
Status: DISCONTINUED | OUTPATIENT
Start: 2023-05-15 | End: 2023-05-15 | Stop reason: HOSPADM

## 2023-05-15 RX ORDER — SODIUM CHLORIDE 0.9 % (FLUSH) 0.9 %
5-40 SYRINGE (ML) INJECTION PRN
Status: DISCONTINUED | OUTPATIENT
Start: 2023-05-15 | End: 2023-05-15 | Stop reason: SDUPTHER

## 2023-05-15 RX ORDER — SODIUM CHLORIDE 9 MG/ML
INJECTION, SOLUTION INTRAVENOUS PRN
Status: DISCONTINUED | OUTPATIENT
Start: 2023-05-15 | End: 2023-05-15 | Stop reason: HOSPADM

## 2023-05-15 RX ORDER — HYDROMORPHONE HCL 110MG/55ML
PATIENT CONTROLLED ANALGESIA SYRINGE INTRAVENOUS PRN
Status: DISCONTINUED | OUTPATIENT
Start: 2023-05-15 | End: 2023-05-15 | Stop reason: SDUPTHER

## 2023-05-15 RX ORDER — MEPERIDINE HYDROCHLORIDE 50 MG/ML
12.5 INJECTION INTRAMUSCULAR; INTRAVENOUS; SUBCUTANEOUS EVERY 5 MIN PRN
Status: DISCONTINUED | OUTPATIENT
Start: 2023-05-15 | End: 2023-05-15 | Stop reason: HOSPADM

## 2023-05-15 RX ORDER — PROPOFOL 10 MG/ML
INJECTION, EMULSION INTRAVENOUS PRN
Status: DISCONTINUED | OUTPATIENT
Start: 2023-05-15 | End: 2023-05-15 | Stop reason: SDUPTHER

## 2023-05-15 RX ORDER — SODIUM CHLORIDE 0.9 % (FLUSH) 0.9 %
5-40 SYRINGE (ML) INJECTION EVERY 12 HOURS SCHEDULED
Status: DISCONTINUED | OUTPATIENT
Start: 2023-05-15 | End: 2023-05-15 | Stop reason: SDUPTHER

## 2023-05-15 RX ADMIN — HYDROMORPHONE HYDROCHLORIDE 1 MG: 1 INJECTION, SOLUTION INTRAMUSCULAR; INTRAVENOUS; SUBCUTANEOUS at 04:02

## 2023-05-15 RX ADMIN — Medication 1 CAPSULE: at 09:27

## 2023-05-15 RX ADMIN — PROPOFOL 150 MG: 10 INJECTION, EMULSION INTRAVENOUS at 17:52

## 2023-05-15 RX ADMIN — FAMOTIDINE 20 MG: 10 INJECTION, SOLUTION INTRAVENOUS at 17:12

## 2023-05-15 RX ADMIN — SODIUM CHLORIDE 1000 ML: 9 INJECTION, SOLUTION INTRAVENOUS at 00:32

## 2023-05-15 RX ADMIN — CEFTRIAXONE SODIUM 1000 MG: 1 INJECTION, POWDER, FOR SOLUTION INTRAMUSCULAR; INTRAVENOUS at 14:10

## 2023-05-15 RX ADMIN — MORPHINE SULFATE 4 MG: 4 INJECTION, SOLUTION INTRAMUSCULAR; INTRAVENOUS at 00:38

## 2023-05-15 RX ADMIN — MORPHINE SULFATE 4 MG: 4 INJECTION, SOLUTION INTRAMUSCULAR; INTRAVENOUS at 20:34

## 2023-05-15 RX ADMIN — ONDANSETRON 4 MG: 2 INJECTION INTRAMUSCULAR; INTRAVENOUS at 23:10

## 2023-05-15 RX ADMIN — MORPHINE SULFATE 4 MG: 4 INJECTION, SOLUTION INTRAMUSCULAR; INTRAVENOUS at 12:41

## 2023-05-15 RX ADMIN — ONDANSETRON 4 MG: 2 INJECTION INTRAMUSCULAR; INTRAVENOUS at 12:47

## 2023-05-15 RX ADMIN — PHENAZOPYRIDINE 200 MG: 100 TABLET ORAL at 12:40

## 2023-05-15 RX ADMIN — PHENAZOPYRIDINE 200 MG: 100 TABLET ORAL at 08:55

## 2023-05-15 RX ADMIN — ONDANSETRON 4 MG: 2 INJECTION INTRAMUSCULAR; INTRAVENOUS at 04:06

## 2023-05-15 RX ADMIN — PHENAZOPYRIDINE 200 MG: 100 TABLET ORAL at 21:50

## 2023-05-15 RX ADMIN — LIDOCAINE HYDROCHLORIDE 80 MG: 20 INJECTION, SOLUTION EPIDURAL; INFILTRATION; INTRACAUDAL; PERINEURAL at 17:52

## 2023-05-15 RX ADMIN — HYDROMORPHONE HYDROCHLORIDE 0.25 MG: 2 INJECTION, SOLUTION INTRAMUSCULAR; INTRAVENOUS; SUBCUTANEOUS at 18:06

## 2023-05-15 RX ADMIN — ONDANSETRON 4 MG: 2 INJECTION INTRAMUSCULAR; INTRAVENOUS at 18:42

## 2023-05-15 RX ADMIN — DEXAMETHASONE SODIUM PHOSPHATE 4 MG: 4 INJECTION, SOLUTION INTRAMUSCULAR; INTRAVENOUS at 17:52

## 2023-05-15 RX ADMIN — ONDANSETRON 4 MG: 2 INJECTION INTRAMUSCULAR; INTRAVENOUS at 17:52

## 2023-05-15 RX ADMIN — MORPHINE SULFATE 4 MG: 4 INJECTION, SOLUTION INTRAMUSCULAR; INTRAVENOUS at 16:44

## 2023-05-15 RX ADMIN — SODIUM CHLORIDE 1000 ML: 9 INJECTION, SOLUTION INTRAVENOUS at 07:00

## 2023-05-15 RX ADMIN — MORPHINE SULFATE 4 MG: 4 INJECTION, SOLUTION INTRAMUSCULAR; INTRAVENOUS at 09:28

## 2023-05-15 RX ADMIN — TAMSULOSIN HYDROCHLORIDE 0.4 MG: 0.4 CAPSULE ORAL at 08:55

## 2023-05-15 RX ADMIN — KETOROLAC TROMETHAMINE 15 MG: 30 INJECTION, SOLUTION INTRAMUSCULAR; INTRAVENOUS at 18:00

## 2023-05-15 RX ADMIN — Medication 1 CAPSULE: at 21:51

## 2023-05-15 RX ADMIN — SODIUM CHLORIDE: 9 INJECTION, SOLUTION INTRAVENOUS at 13:52

## 2023-05-15 ASSESSMENT — PAIN DESCRIPTION - LOCATION
LOCATION: ABDOMEN
LOCATION: VAGINA;LEG
LOCATION: ABDOMEN
LOCATION: GROIN;LEG
LOCATION: ABDOMEN
LOCATION_2: RIB CAGE
LOCATION_2: RIB CAGE
LOCATION: ABDOMEN
LOCATION: ABDOMEN

## 2023-05-15 ASSESSMENT — PAIN DESCRIPTION - INTENSITY
RATING_2: 4
RATING_2: 0

## 2023-05-15 ASSESSMENT — PAIN SCALES - GENERAL
PAINLEVEL_OUTOF10: 9
PAINLEVEL_OUTOF10: 8
PAINLEVEL_OUTOF10: 7
PAINLEVEL_OUTOF10: 3
PAINLEVEL_OUTOF10: 7
PAINLEVEL_OUTOF10: 9
PAINLEVEL_OUTOF10: 3
PAINLEVEL_OUTOF10: 8
PAINLEVEL_OUTOF10: 7
PAINLEVEL_OUTOF10: 3
PAINLEVEL_OUTOF10: 8
PAINLEVEL_OUTOF10: 5

## 2023-05-15 ASSESSMENT — PAIN DESCRIPTION - ORIENTATION
ORIENTATION: LEFT
ORIENTATION: MID;LOWER
ORIENTATION: LEFT
ORIENTATION_2: LOWER;RIGHT
ORIENTATION: MID
ORIENTATION: LOWER

## 2023-05-15 ASSESSMENT — PAIN DESCRIPTION - DESCRIPTORS
DESCRIPTORS: BURNING
DESCRIPTORS: ACHING;SPASM
DESCRIPTORS: ACHING
DESCRIPTORS: ACHING
DESCRIPTORS: BURNING
DESCRIPTORS_2: BURNING
DESCRIPTORS: DISCOMFORT

## 2023-05-15 ASSESSMENT — PAIN DESCRIPTION - PAIN TYPE: TYPE: SURGICAL PAIN

## 2023-05-15 NOTE — BRIEF OP NOTE
Brief Postoperative Note      Patient: Geovany Casillas  YOB: 1985  MRN: [id number]    Date of Procedure: 5/15/2023    Pre-Op Diagnosis Codes:     * Kidney stone on left side [N20.0]    Post-Op Diagnosis: Same       Procedure(s):  CYSTOSCOPY, LEFT URETEROSCOPY, STONE MANIPULATION, STONE BASKET EXTRACTION, AND LEFT STENT REMOVAL    Surgeon(s):  Sarwat Salazar MD    Assistant:  Surgical Assistant: Clive Basurto    Anesthesia: General    Estimated Blood Loss (mL): Minimal    Complications: None    Specimens:   ID Type Source Tests Collected by Time Destination   A : LEFT URETERAL STONES Tissue Tissue SURGICAL PATHOLOGY Sarwat Salazar MD 5/15/2023 1808        Implants:  Implant Name Type Inv.  Item Serial No.  Lot No. LRB No. Used Action   STENT URET 7FR L28CM PERCFLX HYDR+ Ysbael Due [id number]  [facility] 7FR L28CM PERCFLX HYDR+ TAPR TIP GRAD  [facility] UROLOGY- [id number] Left 1 Explanted         Drains: * No LDAs found *    Findings: Distal left ureteral stone and stent in place; stone and stent removed        Electronically signed by Garrett Reese MD on 5/15/2023 at 6:07 PM

## 2023-05-15 NOTE — ANESTHESIA PRE PROCEDURE
Department of Anesthesiology  Preprocedure Note       Name:  Kathrin Munoz   Age:  42 y.o.  :  1985                                          MRN:  [id number]         Date:  5/15/2023      Surgeon: Trina Quach):  Nitesh Vickers MD    Procedure: Procedure(s):  CYSTOSCOPY LEFT URETEROPSCOPY,STONE MANIPULATION, STENT REMOVAL    Medications prior to admission:   Prior to Admission medications    Medication Sig Start Date End Date Taking? Authorizing Provider   promethazine (PHENERGAN) 12.5 MG tablet Take 1 tablet by mouth every 6 hours as needed for Nausea   Yes Historical Provider, MD   oxyCODONE-acetaminophen (PERCOCET) 5-325 MG per tablet Take 1 tablet by mouth every 4 hours as needed for Pain. Max Daily Amount: 6 tablets   Yes Historical Provider, MD   naltrexone-buPROPion (CONTRAVE) 8-90 MG per extended release tablet Take 2 tablets by mouth 2 times daily  Patient not taking: Reported on 2023   Gregg Kirk MD   azithromycin (ZITHROMAX) 250 MG tablet Take 2 tabs (500 mg) on Day 1, and take 1 tab (250 mg) on days 2 through 5. Patient not taking: Reported on 2023   Tabatha Mcneil DO   vitamin D (CHOLECALCIFEROL) 25 MCG (1000 UT) TABS tablet 1 tablet    Historical Provider, MD   ketorolac (TORADOL) 10 MG tablet 1 tablet    Historical Provider, MD   SUMAtriptan Succinate (IMITREX PO) Take by mouth    Historical Provider, MD   ondansetron (ZOFRAN ODT) 4 MG disintegrating tablet Take 1 tablet by mouth every 8 hours as needed for Nausea or Vomiting 22   Tabatha Mcneil DO   tamsulosin (FLOMAX) 0.4 MG capsule Take 1 capsule by mouth in the morning.  22   Reji Scott MD   Rimegepant Sulfate (NURTEC) 75 MG TBDP Take 75 mg by mouth as needed (migraine)    Historical Provider, MD   ibuprofen (ADVIL;MOTRIN) 600 MG tablet Take 1 tablet by mouth 3 times daily as needed for Pain  Patient not taking: Reported on 3/19/2022 2/10/22 7/27/22  Harrison Pérez, APRN - [facility]   eletriptan

## 2023-05-15 NOTE — CARE COORDINATION
Case Management Assessment  Initial Evaluation    Date/Time of Evaluation: 5/15/2023 9:28 AM  Assessment Completed by: Liana Schmid RN    If patient is discharged prior to next notation, then this note serves as note for discharge by case management. Patient Name: Dejon Ford                   YOB: 1985  Diagnosis: Ureterolithiasis [N20.1]  Intractable pain [R52]  Calculus of distal left ureter [N20.1]                   Date / Time: 5/12/2023 12:37 PM    Patient Admission Status: Observation   Readmission Risk (Low < 19, Mod (19-27), High > 27): Readmission Risk Score: 9.4    Current PCP: Nan Henning, DO  PCP verified by CM? Yes    Chart Reviewed: Yes      History Provided by: Patient  Patient Orientation: Alert and Oriented    Patient Cognition: Alert    Hospitalization in the last 30 days (Readmission):  No    If yes, Readmission Assessment in CM Navigator will be completed. Advance Directives:      Code Status: Full Code   Patient's Primary Decision Maker is: Legal Next of Kin    Primary Decision Maker: Sanya Conley - Parent - [phone]    Secondary Decision Maker: Dmitry Velasquez - Parent - [phone]    Discharge Planning:    Patient lives with: Spouse/Significant Other, Children Type of Home: House  Primary Care Giver: Self  Patient Support Systems include: Spouse/Significant Other, Parent, Children   Current Financial resources: Financial Counseling  Current community resources: None  Current services prior to admission: None            Current DME:              Type of Home Care services:  None    ADLS  Prior functional level: Independent in ADLs/IADLs  Current functional level: Independent in ADLs/IADLs    PT AM-PAC:   /24  OT AM-PAC:   /24    Family can provide assistance at DC: Yes  Would you like Case Management to discuss the discharge plan with any other family members/significant others, and if so, who?  Yes (Fiance)  Plans to Return to Present Housing: Yes  Other

## 2023-05-16 VITALS
WEIGHT: 200 LBS | OXYGEN SATURATION: 95 % | BODY MASS INDEX: 30.41 KG/M2 | RESPIRATION RATE: 16 BRPM | TEMPERATURE: 97.6 F | HEART RATE: 63 BPM | DIASTOLIC BLOOD PRESSURE: 70 MMHG | SYSTOLIC BLOOD PRESSURE: 116 MMHG

## 2023-05-16 PROCEDURE — 6370000000 HC RX 637 (ALT 250 FOR IP): Performed by: HOSPITALIST

## 2023-05-16 PROCEDURE — 6370000000 HC RX 637 (ALT 250 FOR IP): Performed by: INTERNAL MEDICINE

## 2023-05-16 PROCEDURE — 6360000002 HC RX W HCPCS: Performed by: INTERNAL MEDICINE

## 2023-05-16 PROCEDURE — 2580000003 HC RX 258: Performed by: HOSPITALIST

## 2023-05-16 RX ORDER — CALCIUM CARBONATE 200(500)MG
1000 TABLET,CHEWABLE ORAL 3 TIMES DAILY PRN
COMMUNITY
Start: 2023-05-16 | End: 2023-06-15

## 2023-05-16 RX ORDER — HYDROCODONE BITARTRATE AND ACETAMINOPHEN 5; 325 MG/1; MG/1
1 TABLET ORAL EVERY 6 HOURS PRN
Qty: 12 TABLET | Refills: 0 | Status: SHIPPED | OUTPATIENT
Start: 2023-05-16 | End: 2023-05-19

## 2023-05-16 RX ORDER — IBUPROFEN 800 MG/1
800 TABLET ORAL EVERY 8 HOURS PRN
Qty: 30 TABLET | Refills: 1 | Status: SHIPPED | OUTPATIENT
Start: 2023-05-16

## 2023-05-16 RX ORDER — PHENAZOPYRIDINE HYDROCHLORIDE 200 MG/1
200 TABLET, FILM COATED ORAL 3 TIMES DAILY PRN
Qty: 10 TABLET | Refills: 1 | Status: SHIPPED | OUTPATIENT
Start: 2023-05-16 | End: 2023-05-19

## 2023-05-16 RX ADMIN — TAMSULOSIN HYDROCHLORIDE 0.4 MG: 0.4 CAPSULE ORAL at 09:26

## 2023-05-16 RX ADMIN — PROCHLORPERAZINE EDISYLATE 10 MG: 5 INJECTION INTRAMUSCULAR; INTRAVENOUS at 04:43

## 2023-05-16 RX ADMIN — SODIUM CHLORIDE 150 ML/HR: 9 INJECTION, SOLUTION INTRAVENOUS at 00:54

## 2023-05-16 RX ADMIN — PHENAZOPYRIDINE 200 MG: 100 TABLET ORAL at 09:26

## 2023-05-16 RX ADMIN — IBUPROFEN 800 MG: 800 TABLET, FILM COATED ORAL at 04:43

## 2023-05-16 RX ADMIN — MORPHINE SULFATE 2 MG: 2 INJECTION, SOLUTION INTRAMUSCULAR; INTRAVENOUS at 00:04

## 2023-05-16 RX ADMIN — Medication 1 CAPSULE: at 09:26

## 2023-05-16 ASSESSMENT — PAIN DESCRIPTION - LOCATION
LOCATION: ABDOMEN

## 2023-05-16 ASSESSMENT — PAIN SCALES - GENERAL
PAINLEVEL_OUTOF10: 4
PAINLEVEL_OUTOF10: 3

## 2023-05-16 ASSESSMENT — PAIN DESCRIPTION - DESCRIPTORS: DESCRIPTORS: ACHING

## 2023-05-16 ASSESSMENT — PAIN DESCRIPTION - ORIENTATION
ORIENTATION: MID;LOWER
ORIENTATION: LOWER

## 2023-05-16 NOTE — ANESTHESIA POSTPROCEDURE EVALUATION
Department of Anesthesiology  Postprocedure Note    Patient: Ban Hutchinson  MRN: [id number]  YOB: 1985  Date of evaluation: 5/16/2023      Procedure Summary     Date: 05/15/23 Room / Location: [address]    Anesthesia Start: 1513 Anesthesia Stop: 6406    Procedure: CYSTOSCOPY, LEFT URETEROSCOPY, STONE MANIPULATION, STONE BASKET EXTRACTION, AND LEFT STENT REMOVAL (Left: Bladder) Diagnosis:       Kidney stone on left side      (LEFT KIDNEY STONE)    Surgeons: Selina Loredo MD Responsible Provider: Clover Michaud MD    Anesthesia Type: general ASA Status: 2          Anesthesia Type: No value filed. Claire Phase I: Claire Score: 5    Claire Phase II:        Anesthesia Post Evaluation    Patient location during evaluation: PACU  Patient participation: complete - patient participated  Level of consciousness: awake and alert  Airway patency: patent  Nausea & Vomiting: no nausea and no vomiting  Complications: no  Cardiovascular status: blood pressure returned to baseline  Respiratory status: acceptable  Hydration status: euvolemic  Comments: VSS on transfer to phase 2 recovery. No anesthetic complications.

## 2023-05-16 NOTE — DISCHARGE SUMMARY
Hospitalist Discharge Summary      Name:  Bozena Rob /Age/Sex: 1985 (42 y.o. female)   Admit Date: 2023  Discharge Date: 23    MRN & CSN:  [id number] & [id number] Encounter Date and Time 23 9:27 AM EDT    Attending:  Buckley Duane, MD Discharging Provider: Buckley Duane, MD       Disposition: home    Admission Diagnoses:   Patient Active Problem List   Diagnosis    Migraines    Interstitial cystitis    Allergic sinusitis    Kidney stones    PCOS (polycystic ovarian syndrome)    Depression    Anxiety    Ectopic pregnancy    Obesity (BMI 30.0-34. 9)    Hydroureteronephrosis    Ureterovesical junction (UVJ) obstruction    Acquired ureterovesical junction (UVJ) obstruction    Obstructive uropathy    History of migraine    SIRS (systemic inflammatory response syndrome) (HCC)    Prolonged emergence from general anesthesia    Stomach ulcer    Urinary incontinence    Endometriosis    IBS (irritable bowel syndrome)    Infertility, female    Morbid obesity due to excess calories (HCC)    Frequent PVCs    Calculus of distal left ureter    Ureteral obstruction, left       Discharge Diagnoses: Principal Problem:    Calculus of distal left ureter  Active Problems:    Frequent PVCs    Ureteral obstruction, left  Resolved Problems:    * No resolved hospital problems. *      Code Status:  Full Code    Condition:  Stable    Discharge Diet: Diet:  ADULT DIET; Regular    PCP to do list:  follow up for post hospital care    Future Appointments   Date Time Provider Ozzy Ray   2023  5:45 PM Tricia Rodriguez MD [facility] Course: 42 y.o. female. She presented to the ER from home. She has been following up with [facility] as an outpatient. She most recently was seen in office 5/10 by COURTNEY Armstrong. She c/o at least a week of left flank pain. She had a h/o multiple prior urinary calculi and many have required intervention.

## 2023-05-16 NOTE — PLAN OF CARE
Problem: Pain  Goal: Verbalizes/displays adequate comfort level or baseline comfort level  Outcome: Progressing     Problem: Discharge Planning  Goal: Discharge to home or other facility with appropriate resources  Outcome: Adequate for Discharge

## 2023-05-16 NOTE — PROGRESS NOTES
D: Patient here from C 5 via bed, taken to bay 9 in PACU. A: Assessment completed and documented, call light is in reach, pre op check list completed and documented, friend is at bedside.
Dr. Helga Apley at [facility] and assessing tele strip, and states is a sinus rhythm with conduction delay. Will continue to monitor. Pt vs remain stable and remain on CMU.
Patient arrived to PACU bay 9, phase one initiated. Placed on bedside monitor, VSS. Report obtained from OR RN and anesthesia. Patient on room air with oral airway. Assessment WNL. Side rails in place, will monitor patient closely.
Pt in NSR with PVC noted at times.   Will continue to monitor
Pt ok for discharge per MD. Discharge instructions given. IV removed. Telemetry monitor removed and CMU notified. No questions or concerns at this time. Transported by wheelchair to personal car with all belongings.
Pt transported to surgery via transport.
Pt transported via bed to 539 by Abe Lamb. Pt tearful upon leaving PACU fo transport to room.   Updated floor RN
Report given to C5 RN, awaiting transport.
Run of [facility] noted on tele. Dr. Rafael Huggins aware and will be over to monitor pt. Pt talking and asymptomatic at this time.   Will continue to monitor
Urology Progress Note      Subjective: Dejon Ford denies complaints. Vitals:  /70   Pulse 63   Temp 97.6 °F (36.4 °C) (Oral)   Resp 16   Wt 200 lb (90.7 kg)   LMP 2019   SpO2 95%   BMI 30.41 kg/m²   Temp  Av °F (36.7 °C)  Min: 97 °F (36.1 °C)  Max: 98.6 °F (37 °C)    Intake/Output Summary (Last 24 hours) at 2023 0904  Last data filed at 2023 0625  Gross per 24 hour   Intake 951.09 ml   Output 3750 ml   Net -2798.91 ml       Exam:   Physical:  Well developed, well nourished in no acute distress  Mood indicates no abnormalities.  Pt doesnt appear depressed      Labs:  WBC:    Lab Results   Component Value Date/Time    WBC 6.4 05/15/2023 06:24 AM     Hemoglobin/Hematocrit:    Lab Results   Component Value Date/Time    HGB 12.5 05/15/2023 06:24 AM    HCT 36.9 05/15/2023 06:24 AM     BMP:    Lab Results   Component Value Date/Time     05/15/2023 06:24 AM    K 3.6 05/15/2023 06:24 AM    K 4.0 2022 10:51 PM     05/15/2023 06:24 AM    CO2 24 05/15/2023 06:24 AM    BUN 4 05/15/2023 06:24 AM    LABALBU 4.1 2023 01:30 PM    CREATININE <0.5 05/15/2023 06:24 AM    CALCIUM 7.6 05/15/2023 06:24 AM    GFRAA >60 2022 10:22 AM    GFRAA >60 2012 09:47 AM    LABGLOM >60 05/15/2023 06:24 AM     PT/INR:    Lab Results   Component Value Date/Time    PROTIME 10.9 10/13/2015 09:15 PM    PROTIME 14.5 2011 06:08 PM    INR 1.01 10/13/2015 09:15 PM     PTT:    Lab Results   Component Value Date/Time    APTT 31.8 2013 03:04 PM   [APTT      Impression/Plan:     Distal left ureteral stone   - sp left ureteral stent on , then ureteroscopy with stone removal and stent removal on 5/15/23  - denies complaints today  - plans to follow up with Dr Magdalena Post as outpatient   - ok for dc from urology perspective when medically ready, signing off, please call with questions     Laisha Chang PA-C  [facility]
distress  HEENT: NC/AT, moist mucous membranes, no oropharyngeal erythema or exudate  Neck: supple, trachea midline, no anterior cervical or SC LAD  Heart:  reg rate and rhythm, no murmurs, no gallops, no rubs. no leg edema  Lungs: clear to auscultation bilaterally- no wheezes, rales or rhonchi, normal air movement, no respiratory distress  Abd: soft, non-tender, non-distended, normal bowel sounds, no masses or organomegaly  Extrem:  no clubbing, cyanosis, or edema, no ulcers, no Phillip's sign, and normal pulses, equal and bilateral 2+  Skin: normal appearing  Psych: A & O x3 and mildly anxious from pain  Neuro: grossly intact, moves all 4 extremities    Assessment:    Principal Problem:    Calculus of distal left ureter  Active Problems:    Frequent PVCs    Ureteral obstruction, left  Resolved Problems:    * No resolved hospital problems. *      Plan:   L ureteral calculus - pt has hx of recurrent stones - unable to retrieve stone so stent placed, now with severe pain - kal is to retrieve stone later today hopefully and remove stent  Chronic interstitial cystitis - adding to pain with stent in place likely irritating bladder  Migraines - will order prn medication as needed for migraine    Prognosis:  Good    Code status:  Full code    DVT prophylaxis: Lovenox  GI prophylaxis: none  Antibiotic prophylaxis indicated:  Probiotic     Diet:  Diet NPO Exceptions are: Ice Chips, Sips of Water with Meds    Disposition:  Home in 1-2 day(s). Patient requires continued admission due to ongoing severe flank and bladder pain due to ureteral calculus as well as stent. Discussed with patient and nursing.   Appropriate for A1 discharge unit:  No    Medications:  Scheduled Meds:   lactobacillus  1 capsule Oral BID WC    phenazopyridine  200 mg Oral TID     cefTRIAXone (ROCEPHIN) IV  1,000 mg IntraVENous Q24H    tamsulosin  0.4 mg Oral Daily    enoxaparin  40 mg SubCUTAneous Daily       PRN Meds:  morphine **OR** morphine,

## 2023-05-17 ENCOUNTER — TELEPHONE (OUTPATIENT)
Dept: FAMILY MEDICINE CLINIC | Age: 38
End: 2023-05-17

## 2023-05-17 NOTE — TELEPHONE ENCOUNTER
Care Transitions Initial Follow Up Call    Outreach made within 2 business days of discharge: Yes    Patient: Randy Bose Patient : 1985   MRN: [id number]  Reason for Admission: There are no discharge diagnoses documented for the most recent discharge. Discharge Date: 23       Spoke with: Attempted to reach the PT for post hospital follow up. VM left to return call to schedule TCM with PCP. Discharge department/facility: [facility]    TCM Interactive Patient Contact:  Was patient able to fill all prescriptions: Yes  Was patient instructed to bring all medications to the follow-up visit: Yes  Is patient taking all medications as directed in the discharge summary?  Yes  Does patient understand their discharge instructions: Yes  Does patient have questions or concerns that need addressed prior to 7-14 day follow up office visit: no    Scheduled appointment with PCP within 7-14 days    Follow Up  Future Appointments   Date Time Provider Ozzy Ray   2023  5:45 PM Luanne Harmon MD [facility]       Jazzy Bryson LPN

## 2023-05-18 NOTE — TELEPHONE ENCOUNTER
Contacted the PT she chooses to follow up with Urology prior to making any Appt with PCP. I advised to return call when she is ready to schedule.

## 2023-05-19 LAB
APPEARANCE STONE: NORMAL
COMPN STONE: NORMAL
SPECIMEN WT: 14 MG

## 2023-05-24 ENCOUNTER — TELEMEDICINE (OUTPATIENT)
Dept: BARIATRICS/WEIGHT MGMT | Age: 38
End: 2023-05-24
Payer: COMMERCIAL

## 2023-05-24 DIAGNOSIS — Z71.3 DIETARY COUNSELING AND SURVEILLANCE: ICD-10-CM

## 2023-05-24 DIAGNOSIS — E66.9 CLASS 1 OBESITY: Primary | ICD-10-CM

## 2023-05-24 PROCEDURE — 99214 OFFICE O/P EST MOD 30 MIN: CPT | Performed by: FAMILY MEDICINE

## 2023-05-24 RX ORDER — NALTREXONE HYDROCHLORIDE AND BUPROPION HYDROCHLORIDE 8; 90 MG/1; MG/1
2 TABLET, EXTENDED RELEASE ORAL 2 TIMES DAILY
Qty: 120 TABLET | Refills: 0 | Status: SHIPPED | OUTPATIENT
Start: 2023-05-24 | End: 2023-06-22

## 2023-05-24 ASSESSMENT — ENCOUNTER SYMPTOMS
BLOOD IN STOOL: 0
CHOKING: 0
ABDOMINAL PAIN: 0
CHEST TIGHTNESS: 0
DIARRHEA: 0
SHORTNESS OF BREATH: 0
VOMITING: 0
ABDOMINAL DISTENTION: 0
APNEA: 0
COUGH: 0
CONSTIPATION: 0
NAUSEA: 0
PHOTOPHOBIA: 0
WHEEZING: 0
EYE PAIN: 0

## 2023-06-01 NOTE — OP NOTE
noted in the distal ureter. This was removed using a stone basket. Once this was done, a retrograde pyelogram was performed, without evidence of filling defects. Therefore, the scope was removed. A stent was not re-inserted. The bladder was drained, the scope was removed, and the patient was taken to recovery in stable condition.          Electronically signed by Josué Hendricks MD on 6/1/2023 at 1:18 PM

## 2023-06-22 ENCOUNTER — TELEMEDICINE (OUTPATIENT)
Dept: BARIATRICS/WEIGHT MGMT | Age: 38
End: 2023-06-22
Payer: COMMERCIAL

## 2023-06-22 DIAGNOSIS — Z71.3 DIETARY COUNSELING AND SURVEILLANCE: ICD-10-CM

## 2023-06-22 DIAGNOSIS — E66.3 OVERWEIGHT (BMI 25.0-29.9): Primary | ICD-10-CM

## 2023-06-22 PROCEDURE — 99214 OFFICE O/P EST MOD 30 MIN: CPT | Performed by: FAMILY MEDICINE

## 2023-06-22 RX ORDER — NALTREXONE HYDROCHLORIDE AND BUPROPION HYDROCHLORIDE 8; 90 MG/1; MG/1
2 TABLET, EXTENDED RELEASE ORAL 2 TIMES DAILY
Qty: 120 TABLET | Refills: 0 | Status: SHIPPED | OUTPATIENT
Start: 2023-06-22

## 2023-06-22 ASSESSMENT — ENCOUNTER SYMPTOMS
COUGH: 0
CONSTIPATION: 0
CHEST TIGHTNESS: 0
BLOOD IN STOOL: 0
WHEEZING: 0
APNEA: 0
ABDOMINAL PAIN: 0
PHOTOPHOBIA: 0
EYE PAIN: 0
VOMITING: 0
ABDOMINAL DISTENTION: 0
CHOKING: 0
NAUSEA: 0
SHORTNESS OF BREATH: 0
DIARRHEA: 0

## 2023-06-22 NOTE — PROGRESS NOTES
Patient: Yobani Church                      Encounter Date: 6/22/2023    YOB: 1985                Age: 42 y.o. Chief Complaint   Patient presents with    Weight Management     F/u MWM         Patient identification was verified at the start of the visit. Patient-Reported Vitals 6/22/2023   Patient-Reported Weight 188   Patient-Reported Height 5'8\"   Patient-Reported Temperature -         BP Readings from Last 1 Encounters:   06/12/23 118/70       BMI Readings from Last 1 Encounters:   06/12/23 29.04 kg/m²       Pulse Readings from Last 1 Encounters:   06/12/23 58     Wt Readings from Last 3 Encounters:   06/12/23 191 lb (86.6 kg)   05/16/23 200 lb (90.7 kg)   05/10/23 196 lb (88.9 kg)       HPI: 42 y.o. female with a long-standing history of obesity presents today for virtual video follow-up. She has lost 4 pounds since her last visit. Current tx includes Contrae and low carb/lester diet. Was off of tx again for about a week because of another bout of kidney stones. Scheduled to have lithotripsy next month. Medication(s): Appetite well controlled? [x]Yes      []No                          Focus:     []Good     [x]Fair     []Poor                          Side effects? No        Any recent change in medication(s)?  No     Exercise: None     Allergies   Allergen Reactions    Fentanyl Nausea And Vomiting     She reports it made her extremely sick and did not help with pain, made her feel like she wanted to die    Topamax [Topiramate] Other (See Comments)     Kidney stones    Cymbalta [Duloxetine Hcl] Other (See Comments)     moodiness         Current Outpatient Medications:     naltrexone-buPROPion (CONTRAVE) 8-90 MG per extended release tablet, Take 2 tablets by mouth 2 times daily, Disp: 120 tablet, Rfl: 0    ibuprofen (ADVIL;MOTRIN) 800 MG tablet, Take 1 tablet by mouth every 8 hours as needed for Pain, Disp: 30 tablet, Rfl: 1    promethazine (PHENERGAN) 12.5 MG tablet, Take 1 tablet by

## 2023-07-20 ENCOUNTER — TELEMEDICINE (OUTPATIENT)
Dept: BARIATRICS/WEIGHT MGMT | Age: 38
End: 2023-07-20
Payer: COMMERCIAL

## 2023-07-20 DIAGNOSIS — Z71.3 DIETARY COUNSELING AND SURVEILLANCE: ICD-10-CM

## 2023-07-20 DIAGNOSIS — E66.3 OVERWEIGHT (BMI 25.0-29.9): Primary | ICD-10-CM

## 2023-07-20 PROCEDURE — 99214 OFFICE O/P EST MOD 30 MIN: CPT | Performed by: FAMILY MEDICINE

## 2023-07-20 RX ORDER — NALTREXONE HYDROCHLORIDE AND BUPROPION HYDROCHLORIDE 8; 90 MG/1; MG/1
2 TABLET, EXTENDED RELEASE ORAL 2 TIMES DAILY
Qty: 120 TABLET | Refills: 0 | Status: SHIPPED | OUTPATIENT
Start: 2023-07-20

## 2023-07-20 ASSESSMENT — ENCOUNTER SYMPTOMS
BLOOD IN STOOL: 0
APNEA: 0
PHOTOPHOBIA: 0
CHEST TIGHTNESS: 0
COUGH: 0
ABDOMINAL DISTENTION: 0
SHORTNESS OF BREATH: 0
NAUSEA: 0
DIARRHEA: 0
WHEEZING: 0
CHOKING: 0
ABDOMINAL PAIN: 0
EYE PAIN: 0
CONSTIPATION: 0
VOMITING: 0

## 2023-08-10 VITALS — HEIGHT: 68 IN | BODY MASS INDEX: 28.49 KG/M2 | WEIGHT: 188 LBS

## 2023-09-06 ENCOUNTER — APPOINTMENT (OUTPATIENT)
Dept: CT IMAGING | Age: 38
End: 2023-09-06
Payer: COMMERCIAL

## 2023-09-06 ENCOUNTER — HOSPITAL ENCOUNTER (EMERGENCY)
Age: 38
Discharge: HOME | End: 2023-09-06
Attending: EMERGENCY MEDICINE
Payer: COMMERCIAL

## 2023-09-06 VITALS
BODY MASS INDEX: 28.49 KG/M2 | OXYGEN SATURATION: 100 % | SYSTOLIC BLOOD PRESSURE: 115 MMHG | TEMPERATURE: 98.3 F | HEIGHT: 68 IN | HEART RATE: 61 BPM | WEIGHT: 188 LBS | DIASTOLIC BLOOD PRESSURE: 91 MMHG | RESPIRATION RATE: 20 BRPM

## 2023-09-06 DIAGNOSIS — N20.1 URETEROLITHIASIS: Primary | ICD-10-CM

## 2023-09-06 LAB
ALBUMIN SERPL-MCNC: 4.5 G/DL (ref 3.4–5)
ALBUMIN/GLOB SERPL: 1.8 {RATIO} (ref 1.1–2.2)
ALP SERPL-CCNC: 66 U/L (ref 40–129)
ALT SERPL-CCNC: 17 U/L (ref 10–40)
ANION GAP SERPL CALCULATED.3IONS-SCNC: 8 MMOL/L (ref 3–16)
AST SERPL-CCNC: 18 U/L (ref 15–37)
BACTERIA URNS QL MICRO: ABNORMAL /HPF
BASOPHILS # BLD: 0 K/UL (ref 0–0.2)
BASOPHILS NFR BLD: 0.3 %
BILIRUB SERPL-MCNC: 0.5 MG/DL (ref 0–1)
BILIRUB UR QL STRIP.AUTO: NEGATIVE
BUN SERPL-MCNC: 10 MG/DL (ref 7–20)
CALCIUM SERPL-MCNC: 8.7 MG/DL (ref 8.3–10.6)
CHLORIDE SERPL-SCNC: 102 MMOL/L (ref 99–110)
CLARITY UR: CLEAR
CO2 SERPL-SCNC: 26 MMOL/L (ref 21–32)
COLOR UR: ABNORMAL
CREAT SERPL-MCNC: 0.6 MG/DL (ref 0.6–1.1)
DEPRECATED RDW RBC AUTO: 13 % (ref 12.4–15.4)
EOSINOPHIL # BLD: 0.2 K/UL (ref 0–0.6)
EOSINOPHIL NFR BLD: 2.8 %
EPI CELLS #/AREA URNS HPF: ABNORMAL /HPF (ref 0–5)
GFR SERPLBLD CREATININE-BSD FMLA CKD-EPI: >60 ML/MIN/{1.73_M2}
GLUCOSE SERPL-MCNC: 114 MG/DL (ref 70–99)
GLUCOSE UR STRIP.AUTO-MCNC: NEGATIVE MG/DL
HCT VFR BLD AUTO: 40.2 % (ref 36–48)
HGB BLD-MCNC: 14.1 G/DL (ref 12–16)
HGB UR QL STRIP.AUTO: ABNORMAL
KETONES UR STRIP.AUTO-MCNC: NEGATIVE MG/DL
LEUKOCYTE ESTERASE UR QL STRIP.AUTO: NEGATIVE
LYMPHOCYTES # BLD: 1.7 K/UL (ref 1–5.1)
LYMPHOCYTES NFR BLD: 23.2 %
MCH RBC QN AUTO: 30.2 PG (ref 26–34)
MCHC RBC AUTO-ENTMCNC: 35 G/DL (ref 31–36)
MCV RBC AUTO: 86.3 FL (ref 80–100)
MONOCYTES # BLD: 0.6 K/UL (ref 0–1.3)
MONOCYTES NFR BLD: 8.6 %
NEUTROPHILS # BLD: 4.7 K/UL (ref 1.7–7.7)
NEUTROPHILS NFR BLD: 65.1 %
NITRITE UR QL STRIP.AUTO: NEGATIVE
PH UR STRIP.AUTO: 7 [PH] (ref 5–8)
PLATELET # BLD AUTO: 291 K/UL (ref 135–450)
PMV BLD AUTO: 7.3 FL (ref 5–10.5)
POTASSIUM SERPL-SCNC: 3.6 MMOL/L (ref 3.5–5.1)
PROT SERPL-MCNC: 7 G/DL (ref 6.4–8.2)
PROT UR STRIP.AUTO-MCNC: NEGATIVE MG/DL
RBC # BLD AUTO: 4.65 M/UL (ref 4–5.2)
RBC #/AREA URNS HPF: ABNORMAL /HPF (ref 0–4)
SODIUM SERPL-SCNC: 136 MMOL/L (ref 136–145)
SP GR UR STRIP.AUTO: <=1.005 (ref 1–1.03)
UA COMPLETE W REFLEX CULTURE PNL UR: ABNORMAL
UA DIPSTICK W REFLEX MICRO PNL UR: YES
URN SPEC COLLECT METH UR: ABNORMAL
UROBILINOGEN UR STRIP-ACNC: 0.2 E.U./DL
WBC # BLD AUTO: 7.2 K/UL (ref 4–11)
WBC #/AREA URNS HPF: ABNORMAL /HPF (ref 0–5)

## 2023-09-06 PROCEDURE — 6360000002 HC RX W HCPCS: Performed by: EMERGENCY MEDICINE

## 2023-09-06 PROCEDURE — 2580000003 HC RX 258: Performed by: EMERGENCY MEDICINE

## 2023-09-06 PROCEDURE — 81001 URINALYSIS AUTO W/SCOPE: CPT

## 2023-09-06 PROCEDURE — 96374 THER/PROPH/DIAG INJ IV PUSH: CPT

## 2023-09-06 PROCEDURE — 96375 TX/PRO/DX INJ NEW DRUG ADDON: CPT

## 2023-09-06 PROCEDURE — 80053 COMPREHEN METABOLIC PANEL: CPT

## 2023-09-06 PROCEDURE — 85025 COMPLETE CBC W/AUTO DIFF WBC: CPT

## 2023-09-06 PROCEDURE — 74176 CT ABD & PELVIS W/O CONTRAST: CPT

## 2023-09-06 PROCEDURE — 99284 EMERGENCY DEPT VISIT MOD MDM: CPT

## 2023-09-06 RX ORDER — 0.9 % SODIUM CHLORIDE 0.9 %
1000 INTRAVENOUS SOLUTION INTRAVENOUS ONCE
Status: COMPLETED | OUTPATIENT
Start: 2023-09-06 | End: 2023-09-06

## 2023-09-06 RX ORDER — KETOROLAC TROMETHAMINE 30 MG/ML
15 INJECTION, SOLUTION INTRAMUSCULAR; INTRAVENOUS ONCE
Status: COMPLETED | OUTPATIENT
Start: 2023-09-06 | End: 2023-09-06

## 2023-09-06 RX ORDER — TAMSULOSIN HYDROCHLORIDE 0.4 MG/1
0.4 CAPSULE ORAL DAILY
Qty: 7 CAPSULE | Refills: 0 | Status: SHIPPED | OUTPATIENT
Start: 2023-09-06 | End: 2023-09-13

## 2023-09-06 RX ORDER — ONDANSETRON 4 MG/1
4 TABLET, FILM COATED ORAL 3 TIMES DAILY PRN
Qty: 15 TABLET | Refills: 0 | Status: SHIPPED | OUTPATIENT
Start: 2023-09-06

## 2023-09-06 RX ORDER — OXYCODONE HYDROCHLORIDE AND ACETAMINOPHEN 5; 325 MG/1; MG/1
1 TABLET ORAL EVERY 6 HOURS PRN
Qty: 12 TABLET | Refills: 0 | Status: SHIPPED | OUTPATIENT
Start: 2023-09-06 | End: 2023-09-09

## 2023-09-06 RX ORDER — ONDANSETRON 2 MG/ML
4 INJECTION INTRAMUSCULAR; INTRAVENOUS ONCE
Status: COMPLETED | OUTPATIENT
Start: 2023-09-06 | End: 2023-09-06

## 2023-09-06 RX ADMIN — ONDANSETRON 4 MG: 2 INJECTION INTRAMUSCULAR; INTRAVENOUS at 14:22

## 2023-09-06 RX ADMIN — KETOROLAC TROMETHAMINE 15 MG: 30 INJECTION, SOLUTION INTRAMUSCULAR; INTRAVENOUS at 14:22

## 2023-09-06 RX ADMIN — SODIUM CHLORIDE 1000 ML: 9 INJECTION, SOLUTION INTRAVENOUS at 14:21

## 2023-09-06 ASSESSMENT — PAIN DESCRIPTION - FREQUENCY: FREQUENCY: CONTINUOUS

## 2023-09-06 ASSESSMENT — PAIN DESCRIPTION - ORIENTATION: ORIENTATION: RIGHT

## 2023-09-06 ASSESSMENT — PAIN SCALES - GENERAL: PAINLEVEL_OUTOF10: 5

## 2023-09-06 ASSESSMENT — PAIN DESCRIPTION - LOCATION: LOCATION: FLANK

## 2023-09-06 ASSESSMENT — PAIN DESCRIPTION - PAIN TYPE: TYPE: ACUTE PAIN

## 2023-09-12 ENCOUNTER — TELEMEDICINE (OUTPATIENT)
Dept: BARIATRICS/WEIGHT MGMT | Age: 38
End: 2023-09-12
Payer: COMMERCIAL

## 2023-09-12 DIAGNOSIS — Z71.3 DIETARY COUNSELING AND SURVEILLANCE: ICD-10-CM

## 2023-09-12 DIAGNOSIS — E66.3 OVERWEIGHT (BMI 25.0-29.9): Primary | ICD-10-CM

## 2023-09-12 PROCEDURE — 99214 OFFICE O/P EST MOD 30 MIN: CPT | Performed by: FAMILY MEDICINE

## 2023-09-12 RX ORDER — NALTREXONE HYDROCHLORIDE AND BUPROPION HYDROCHLORIDE 8; 90 MG/1; MG/1
2 TABLET, EXTENDED RELEASE ORAL 2 TIMES DAILY
Qty: 120 TABLET | Refills: 0 | Status: SHIPPED | OUTPATIENT
Start: 2023-09-12

## 2023-09-12 ASSESSMENT — ENCOUNTER SYMPTOMS
CHEST TIGHTNESS: 0
ABDOMINAL PAIN: 0
COUGH: 0
CHOKING: 0
WHEEZING: 0
BLOOD IN STOOL: 0
DIARRHEA: 0
EYE PAIN: 0
NAUSEA: 0
APNEA: 0
VOMITING: 0
ABDOMINAL DISTENTION: 0
CONSTIPATION: 0
SHORTNESS OF BREATH: 0
PHOTOPHOBIA: 0

## 2023-09-12 NOTE — PROGRESS NOTES
Proper use of medication and side effects      Controlled Substance Monitoring:         No data to display                     Treatment start date: 9/13/23  12 weeks: 12/13/23  Starting weight: 188 pounds    Goal: At least 5% (9 pounds)     Treatment start date: 5/25/23  12 weeks: 8/25/23  Starting weight: 200 pounds    Goal: ~10 pounds   Weight loss: 12 pounds       Key dietary points:    - Meats (preferably organic or grass fed) are great sources of protein and have no carbohydrates. - Recommend coconut, olive, avocado, or almond oils. - When buying dairy, choose regular or full fat options. - Choose vegetables that grow above ground as they are generally lower in carbohydrates and higher in fiber.  - Avoid starches such as bread, rice, potatoes, pasta and all sources of simple sugars (desserts, soda, breakfast cereals). - Choose beverages that are calorie and sugar free. Reminder regarding weight loss medications: You must be seen in office every 2-4 weeks to haveyour prescriptions refilled. If you are off of your medication for longer than 7 days, you will not be able to restart the medication for at least 6 months. Always call our office to report any side effects. Females, it is your responsibility to obtain negative pregnancy tests each month. No orders of the defined types were placed in this encounter. No follow-ups on file. Shaheen Estrada is a 34 y.o. female being evaluated by a Virtual Visit (video visit) encounter to address concerns as mentioned above. A caregiver was present when appropriate. Due to this being a TeleHealth encounter evaluation of the following organ systems was limited: Vitals/Constitutional/EENT/Resp/CV/GI//MS/Neuro/Skin/Heme-Lymph-Imm. Shaheen sEtrada, was evaluated through a synchronous (real-time) audio-video encounter. The patient (or guardian if applicable) is aware that this is a billable service, which includes applicable co-pays.  This

## 2023-09-13 NOTE — CARE COORDINATION
[facility] Transitions Follow Up Call    3/31/2022    Patient: Constantine Jefferson  Patient : 1985   MRN: <[id number]>  Reason for Admission: UVJ obstruction  Discharge Date: 3/21/22 RARS: Readmission Risk Score: 9 ( )         Spoke with: NA    Attempted to reach patient via phone for transition call. Patient answered phone and states that now is not a good time. Patient is back to work and requests to be called at 4 pm.  Cale Freedman LPN [address] [place] [address]    Care Transitions Subsequent and Final Call    Subsequent and Final Calls  Care Transitions Interventions  Other Interventions: Follow Up  No future appointments.     Cale Freedman LPN Initiate Treatment: Triamcinolone 0.1% cream Otc Regimen: Dove for sensitive skin soap and Cerave Moisturizer cream Detail Level: Zone

## 2023-10-24 ENCOUNTER — TELEMEDICINE (OUTPATIENT)
Dept: BARIATRICS/WEIGHT MGMT | Age: 38
End: 2023-10-24
Payer: COMMERCIAL

## 2023-10-24 DIAGNOSIS — E66.3 OVERWEIGHT (BMI 25.0-29.9): Primary | ICD-10-CM

## 2023-10-24 DIAGNOSIS — Z71.3 DIETARY COUNSELING AND SURVEILLANCE: ICD-10-CM

## 2023-10-24 PROCEDURE — 99214 OFFICE O/P EST MOD 30 MIN: CPT | Performed by: FAMILY MEDICINE

## 2023-10-24 RX ORDER — NALTREXONE HYDROCHLORIDE AND BUPROPION HYDROCHLORIDE 8; 90 MG/1; MG/1
2 TABLET, EXTENDED RELEASE ORAL 2 TIMES DAILY
Qty: 120 TABLET | Refills: 0 | Status: SHIPPED | OUTPATIENT
Start: 2023-10-24

## 2023-10-24 ASSESSMENT — ENCOUNTER SYMPTOMS
BLOOD IN STOOL: 0
EYE PAIN: 0
NAUSEA: 0
CONSTIPATION: 0
COUGH: 0
SHORTNESS OF BREATH: 0
ABDOMINAL PAIN: 0
PHOTOPHOBIA: 0
CHEST TIGHTNESS: 0
CHOKING: 0
ABDOMINAL DISTENTION: 0
APNEA: 0
WHEEZING: 0
VOMITING: 0
DIARRHEA: 0

## 2023-10-27 ENCOUNTER — OFFICE VISIT (OUTPATIENT)
Dept: FAMILY MEDICINE CLINIC | Age: 38
End: 2023-10-27
Payer: COMMERCIAL

## 2023-10-27 VITALS
OXYGEN SATURATION: 98 % | BODY MASS INDEX: 27.83 KG/M2 | SYSTOLIC BLOOD PRESSURE: 116 MMHG | WEIGHT: 183 LBS | HEART RATE: 74 BPM | DIASTOLIC BLOOD PRESSURE: 80 MMHG

## 2023-10-27 DIAGNOSIS — R49.0 HOARSENESS: Primary | ICD-10-CM

## 2023-10-27 DIAGNOSIS — E55.9 VITAMIN D DEFICIENCY: ICD-10-CM

## 2023-10-27 DIAGNOSIS — R49.0 HOARSENESS: ICD-10-CM

## 2023-10-27 DIAGNOSIS — F33.2 ENDOGENOUS DEPRESSION (HCC): ICD-10-CM

## 2023-10-27 PROCEDURE — 99213 OFFICE O/P EST LOW 20 MIN: CPT | Performed by: FAMILY MEDICINE

## 2023-10-27 ASSESSMENT — ENCOUNTER SYMPTOMS: COUGH: 0

## 2023-10-28 LAB
25(OH)D3 SERPL-MCNC: 21.2 NG/ML
TSH SERPL DL<=0.005 MIU/L-ACNC: 1.19 UIU/ML (ref 0.27–4.2)

## 2023-11-15 ENCOUNTER — HOSPITAL ENCOUNTER (EMERGENCY)
Age: 38
Discharge: HOME | End: 2023-11-15
Payer: COMMERCIAL

## 2023-11-15 VITALS
OXYGEN SATURATION: 99 % | HEART RATE: 65 BPM | DIASTOLIC BLOOD PRESSURE: 63 MMHG | TEMPERATURE: 98.2 F | SYSTOLIC BLOOD PRESSURE: 119 MMHG | RESPIRATION RATE: 18 BRPM

## 2023-11-15 DIAGNOSIS — R51.9 ACUTE NONINTRACTABLE HEADACHE, UNSPECIFIED HEADACHE TYPE: Primary | ICD-10-CM

## 2023-11-15 DIAGNOSIS — U07.1 COVID-19: ICD-10-CM

## 2023-11-15 PROCEDURE — 96375 TX/PRO/DX INJ NEW DRUG ADDON: CPT

## 2023-11-15 PROCEDURE — 96374 THER/PROPH/DIAG INJ IV PUSH: CPT

## 2023-11-15 PROCEDURE — 2580000003 HC RX 258: Performed by: PHYSICIAN ASSISTANT

## 2023-11-15 PROCEDURE — 99284 EMERGENCY DEPT VISIT MOD MDM: CPT

## 2023-11-15 PROCEDURE — 6360000002 HC RX W HCPCS: Performed by: PHYSICIAN ASSISTANT

## 2023-11-15 RX ORDER — KETOROLAC TROMETHAMINE 30 MG/ML
30 INJECTION, SOLUTION INTRAMUSCULAR; INTRAVENOUS ONCE
Status: COMPLETED | OUTPATIENT
Start: 2023-11-15 | End: 2023-11-15

## 2023-11-15 RX ORDER — 0.9 % SODIUM CHLORIDE 0.9 %
1000 INTRAVENOUS SOLUTION INTRAVENOUS ONCE
Status: COMPLETED | OUTPATIENT
Start: 2023-11-15 | End: 2023-11-15

## 2023-11-15 RX ORDER — DIPHENHYDRAMINE HYDROCHLORIDE 50 MG/ML
50 INJECTION INTRAMUSCULAR; INTRAVENOUS ONCE
Status: COMPLETED | OUTPATIENT
Start: 2023-11-15 | End: 2023-11-15

## 2023-11-15 RX ORDER — PROCHLORPERAZINE EDISYLATE 5 MG/ML
10 INJECTION INTRAMUSCULAR; INTRAVENOUS ONCE
Status: COMPLETED | OUTPATIENT
Start: 2023-11-15 | End: 2023-11-15

## 2023-11-15 RX ADMIN — KETOROLAC TROMETHAMINE 30 MG: 30 INJECTION, SOLUTION INTRAMUSCULAR at 19:39

## 2023-11-15 RX ADMIN — SODIUM CHLORIDE 1000 ML: 9 INJECTION, SOLUTION INTRAVENOUS at 19:39

## 2023-11-15 RX ADMIN — DIPHENHYDRAMINE HYDROCHLORIDE 50 MG: 50 INJECTION, SOLUTION INTRAMUSCULAR; INTRAVENOUS at 19:37

## 2023-11-15 RX ADMIN — PROCHLORPERAZINE EDISYLATE 10 MG: 5 INJECTION INTRAMUSCULAR; INTRAVENOUS at 19:39

## 2023-11-15 ASSESSMENT — ENCOUNTER SYMPTOMS
SHORTNESS OF BREATH: 0
EYE REDNESS: 0
COUGH: 0
CONSTIPATION: 0
ABDOMINAL PAIN: 0
RHINORRHEA: 0
SINUS PRESSURE: 0
SORE THROAT: 0
DIARRHEA: 0
EYE DISCHARGE: 0
SINUS PAIN: 0
NAUSEA: 1
VOMITING: 1
CHEST TIGHTNESS: 0

## 2023-11-15 ASSESSMENT — PAIN DESCRIPTION - LOCATION
LOCATION: HEAD
LOCATION: HEAD

## 2023-11-15 ASSESSMENT — PAIN SCALES - GENERAL
PAINLEVEL_OUTOF10: 2
PAINLEVEL_OUTOF10: 10

## 2023-11-16 ENCOUNTER — TELEMEDICINE (OUTPATIENT)
Dept: BARIATRICS/WEIGHT MGMT | Age: 38
End: 2023-11-16
Payer: COMMERCIAL

## 2023-11-16 DIAGNOSIS — E66.3 OVERWEIGHT (BMI 25.0-29.9): Primary | ICD-10-CM

## 2023-11-16 DIAGNOSIS — Z71.3 DIETARY COUNSELING AND SURVEILLANCE: ICD-10-CM

## 2023-11-16 PROCEDURE — 99214 OFFICE O/P EST MOD 30 MIN: CPT | Performed by: FAMILY MEDICINE

## 2023-11-16 RX ORDER — NALTREXONE HYDROCHLORIDE AND BUPROPION HYDROCHLORIDE 8; 90 MG/1; MG/1
2 TABLET, EXTENDED RELEASE ORAL 2 TIMES DAILY
Qty: 120 TABLET | Refills: 0 | Status: SHIPPED | OUTPATIENT
Start: 2023-11-16

## 2023-11-16 ASSESSMENT — ENCOUNTER SYMPTOMS
EYE PAIN: 0
ABDOMINAL PAIN: 0
BLOOD IN STOOL: 0
APNEA: 0
CHEST TIGHTNESS: 0
COUGH: 0
CONSTIPATION: 0
DIARRHEA: 0
VOMITING: 0
CHOKING: 0
WHEEZING: 0
ABDOMINAL DISTENTION: 0
NAUSEA: 0
SHORTNESS OF BREATH: 0
PHOTOPHOBIA: 0

## 2023-11-16 NOTE — ED PROVIDER NOTES
[address] [place] [address]  ED  EMERGENCY DEPARTMENT ENCOUNTER        Pt Name: Gwendolyn Alves  MRN: [id number]  [facility] [facility] 1985  Date of evaluation: 11/15/2023  Provider: Farooq Abebe PA-C  PCP: Hamlet Yeager DO  Note Started: 8:10 PM EST 11/15/23      TRUE. I have evaluated this patient. CHIEF COMPLAINT       Chief Complaint   Patient presents with    Headache     Migraine started 11am today pt took Imitrex twice last dose around 530pm. Pt tested positive for covid today. HISTORY OF PRESENT ILLNESS: 1 or more Elements     History from : Patient    Limitations to history : None    Gwendolyn Alves is a 34 y.o. female who presents to the ER for evaluation of a 1 day history of a bilateral throbbing aching headache. Gradual onset of symptoms that have been progressing throughout the day associated photophobia. Fevers and chills. Patient tested positive for COVID this morning. She has tried her home prescribed Imitrex and ibuprofen for her symptoms with no improvement. Nursing Notes were all reviewed and agreed with or any disagreements were addressed in the HPI. REVIEW OF SYSTEMS :      Review of Systems   Constitutional:  Negative for chills and fever. HENT: Negative. Negative for congestion, rhinorrhea, sinus pressure, sinus pain and sore throat. Eyes:  Negative for discharge, redness and visual disturbance. Respiratory:  Negative for cough, chest tightness and shortness of breath. Cardiovascular:  Negative for chest pain and palpitations. Gastrointestinal:  Positive for nausea and vomiting. Negative for abdominal pain, constipation and diarrhea. Genitourinary:  Negative for difficulty urinating, dysuria and frequency. Musculoskeletal: Negative. Skin: Negative. Neurological:  Positive for headaches. Negative for dizziness, weakness and numbness. Psychiatric/Behavioral: Negative. All other systems reviewed and are negative.       Positives and Pertinent

## (undated) DEVICE — SUTURE VCRL + SZ 0 L27IN ABSRB VLT L26MM UR-6 5/8 CIR VCP603H

## (undated) DEVICE — MEGA SUTURECUT ND: Brand: ENDOWRIST

## (undated) DEVICE — Device

## (undated) DEVICE — SUTURE VCRL SZ 0 L36IN ABSRB UD CT-1 L36MM 1/2 CIR TAPR PNT VCP946H

## (undated) DEVICE — PUMP SUC IRR TBNG L10FT W/ HNDPC ASSEMB STRYKEFLOW 2

## (undated) DEVICE — ARM DRAPE

## (undated) DEVICE — SYSTEM SMK EVAC LAP TBNG FILTER HSNG BENT STYL PNK SEE CLR

## (undated) DEVICE — SOLUTION IV IRRIG WATER 1000ML POUR BRL 2F7114

## (undated) DEVICE — SOLUTION IV IRRIG 500ML 0.9% SODIUM CHL 2F7123

## (undated) DEVICE — FENESTRATED BIPOLAR FORCEPS: Brand: ENDOWRIST

## (undated) DEVICE — SYRINGE MED 10ML LUERLOCK TIP W/O SFTY DISP

## (undated) DEVICE — Z DISCONTINUED USE 2275686 GLOVE SURG SZ 8 L12IN FNGR THK13MIL WHT ISOLEX POLYISOPRENE

## (undated) DEVICE — NITINOL STONE RETRIEVAL BASKET: Brand: ZERO TIP

## (undated) DEVICE — GUIDEWIRE VASC STR 3 CM 0.035 INX150 CM STD NIT ZIPWIRE

## (undated) DEVICE — Z DISCONTINUED USE 2275683 GLOVE SURG SZ 6.5 L12IN FNGR THK13MIL WHT ISOLEX

## (undated) DEVICE — GOWN SIRUS NONREIN XL W/TWL: Brand: MEDLINE INDUSTRIES, INC.

## (undated) DEVICE — Z DISCONTINUED NO SUB IDED GLOVE SURG SZ 6 L12IN FNGR THK13MIL WHT ISOLEX POLYISOPRENE

## (undated) DEVICE — GLOVE ORANGE PI 8   MSG9080

## (undated) DEVICE — TIP COVER ACCESSORY

## (undated) DEVICE — SYSTEM PMP SGL ACT W/ 10CC VAC SYR 1 W VLV FOR ENDOSCP SURG

## (undated) DEVICE — LONG TIP FORCEPS: Brand: ENDOWRIST

## (undated) DEVICE — GLOVE,SURG,SENSICARE SLT,LF,PF,7.5: Brand: MEDLINE

## (undated) DEVICE — MARYLAND BIPOLAR FORCEPS: Brand: ENDOWRIST

## (undated) DEVICE — SCISSORS SURG DIA8MM MPLR CRV ENDOWRIST

## (undated) DEVICE — Z DISCONTINUED GLOVE SURG SZ 7 L12IN FNGR THK13MIL WHT ISOLEX POLYISOPRENE

## (undated) DEVICE — SOLUTION IRRIG 2000ML STRL H2O UROMATIC PLAS CONT USP

## (undated) DEVICE — SUTURE VCRL + SZ 4-0 L18IN ABSRB UD L19MM PS-2 3/8 CIR PRIM VCP496H

## (undated) DEVICE — TIP IU L8CM DIA6.7MM BLU SIL FLX DISP RUMI II

## (undated) DEVICE — GUIDEWIRE ENDOSCP L150CM DIA0.035IN TIP 3CM PTFE NIT

## (undated) DEVICE — CANNULA SEAL

## (undated) DEVICE — GLOVE ORANGE PI 7 1/2   MSG9075

## (undated) DEVICE — CYSTO: Brand: MEDLINE INDUSTRIES, INC.

## (undated) DEVICE — GLOVE ORTHO 8   MSG9480

## (undated) DEVICE — TROCAR: Brand: KII OPTICAL ACCESS SYSTEM

## (undated) DEVICE — COLUMN DRAPE

## (undated) DEVICE — OPEN-END FLEXI-TIP URETERAL CATHETER: Brand: FLEXI-TIP

## (undated) DEVICE — SYSTEM ES CUP DIA3.5CM S STL CAPPED EZ LOAD PNEUMO OCCL

## (undated) DEVICE — BLADELESS OBTURATOR: Brand: WECK VISTA

## (undated) DEVICE — SINGLE ACTION PUMPING SYSTEM

## (undated) DEVICE — GOWN SIRUS NONREIN LG W/TWL: Brand: MEDLINE INDUSTRIES, INC.